# Patient Record
Sex: FEMALE | Race: WHITE | NOT HISPANIC OR LATINO | URBAN - METROPOLITAN AREA
[De-identification: names, ages, dates, MRNs, and addresses within clinical notes are randomized per-mention and may not be internally consistent; named-entity substitution may affect disease eponyms.]

---

## 2019-10-21 ENCOUNTER — OUTPATIENT (OUTPATIENT)
Dept: OUTPATIENT SERVICES | Facility: HOSPITAL | Age: 59
LOS: 1 days | End: 2019-10-21
Payer: COMMERCIAL

## 2019-10-21 VITALS
WEIGHT: 243.83 LBS | SYSTOLIC BLOOD PRESSURE: 141 MMHG | HEART RATE: 69 BPM | OXYGEN SATURATION: 98 % | RESPIRATION RATE: 16 BRPM | HEIGHT: 66 IN | TEMPERATURE: 98 F | DIASTOLIC BLOOD PRESSURE: 81 MMHG

## 2019-10-21 DIAGNOSIS — M20.5X1 OTHER DEFORMITIES OF TOE(S) (ACQUIRED), RIGHT FOOT: ICD-10-CM

## 2019-10-21 DIAGNOSIS — Z01.818 ENCOUNTER FOR OTHER PREPROCEDURAL EXAMINATION: ICD-10-CM

## 2019-10-21 DIAGNOSIS — M20.21 HALLUX RIGIDUS, RIGHT FOOT: ICD-10-CM

## 2019-10-21 DIAGNOSIS — M24.574 CONTRACTURE, RIGHT FOOT: ICD-10-CM

## 2019-10-21 LAB
ANION GAP SERPL CALC-SCNC: 10 MMOL/L — SIGNIFICANT CHANGE UP (ref 5–17)
BUN SERPL-MCNC: 15 MG/DL — SIGNIFICANT CHANGE UP (ref 7–23)
CALCIUM SERPL-MCNC: 9.4 MG/DL — SIGNIFICANT CHANGE UP (ref 8.4–10.5)
CHLORIDE SERPL-SCNC: 106 MMOL/L — SIGNIFICANT CHANGE UP (ref 96–108)
CO2 SERPL-SCNC: 26 MMOL/L — SIGNIFICANT CHANGE UP (ref 22–31)
CREAT SERPL-MCNC: 0.94 MG/DL — SIGNIFICANT CHANGE UP (ref 0.5–1.3)
GLUCOSE SERPL-MCNC: 105 MG/DL — HIGH (ref 70–99)
HCT VFR BLD CALC: 41.3 % — SIGNIFICANT CHANGE UP (ref 34.5–45)
HGB BLD-MCNC: 13.1 G/DL — SIGNIFICANT CHANGE UP (ref 11.5–15.5)
MCHC RBC-ENTMCNC: 31 PG — SIGNIFICANT CHANGE UP (ref 27–34)
MCHC RBC-ENTMCNC: 31.7 GM/DL — LOW (ref 32–36)
MCV RBC AUTO: 97.6 FL — SIGNIFICANT CHANGE UP (ref 80–100)
NRBC # BLD: 0 /100 WBCS — SIGNIFICANT CHANGE UP (ref 0–0)
PLATELET # BLD AUTO: 251 K/UL — SIGNIFICANT CHANGE UP (ref 150–400)
POTASSIUM SERPL-MCNC: 4.3 MMOL/L — SIGNIFICANT CHANGE UP (ref 3.5–5.3)
POTASSIUM SERPL-SCNC: 4.3 MMOL/L — SIGNIFICANT CHANGE UP (ref 3.5–5.3)
RBC # BLD: 4.23 M/UL — SIGNIFICANT CHANGE UP (ref 3.8–5.2)
RBC # FLD: 12.7 % — SIGNIFICANT CHANGE UP (ref 10.3–14.5)
SODIUM SERPL-SCNC: 142 MMOL/L — SIGNIFICANT CHANGE UP (ref 135–145)
WBC # BLD: 7.94 K/UL — SIGNIFICANT CHANGE UP (ref 3.8–10.5)
WBC # FLD AUTO: 7.94 K/UL — SIGNIFICANT CHANGE UP (ref 3.8–10.5)

## 2019-10-21 PROCEDURE — 36415 COLL VENOUS BLD VENIPUNCTURE: CPT

## 2019-10-21 PROCEDURE — 85027 COMPLETE CBC AUTOMATED: CPT

## 2019-10-21 PROCEDURE — G0463: CPT

## 2019-10-21 PROCEDURE — 80048 BASIC METABOLIC PNL TOTAL CA: CPT

## 2019-10-21 PROCEDURE — 93010 ELECTROCARDIOGRAM REPORT: CPT | Mod: NC

## 2019-10-21 PROCEDURE — 93005 ELECTROCARDIOGRAM TRACING: CPT

## 2019-10-21 RX ORDER — SODIUM CHLORIDE 9 MG/ML
1000 INJECTION, SOLUTION INTRAVENOUS
Refills: 0 | Status: DISCONTINUED | OUTPATIENT
Start: 2019-10-21 | End: 2019-10-21

## 2019-10-21 NOTE — H&P PST ADULT - ATTENDING COMMENTS
I have briefly examined this patient and reviewed her chart .  She denies any physical complaints today or changes in her medical status since PST .  see clearance .  Kassandra CHATTERJEE

## 2019-10-21 NOTE — H&P PST ADULT - NSANTHOSAYNRD_GEN_A_CORE
No. BARRIE screening performed.  STOP BANG Legend: 0-2 = LOW Risk; 3-4 = INTERMEDIATE Risk; 5-8 = HIGH Risk

## 2019-10-21 NOTE — H&P PST ADULT - CLICK TO LAUNCH ORM
Patient participated in class , and had many questions  She admits she loves chocolate, and still is having a hard time "giving up" sweets  She is working on this toward her Goal management 
.

## 2019-10-21 NOTE — H&P PST ADULT - NSICDXPASTMEDICALHX_GEN_ALL_CORE_FT
PAST MEDICAL HISTORY:  Hypercholesteremia     Hypothyroidism PAST MEDICAL HISTORY:  H/O diverticulitis of colon     Hypercholesteremia     Hypothyroidism     Osteoarthritis

## 2019-10-21 NOTE — H&P PST ADULT - NSICDXFAMILYHX_GEN_ALL_CORE_FT
FAMILY HISTORY:  FH: breast cancer, Sister- Alive  FH: heart attack, Father-   FH: hypercholesterolemia, Mother- Alive  FH: hypertension, Mother- Alive

## 2019-10-21 NOTE — H&P PST ADULT - NSICDXPASTSURGICALHX_GEN_ALL_CORE_FT
PAST SURGICAL HISTORY:  H/O colonoscopy 7/2019    H/O squamous cell carcinoma excision 5/2008    History of right hip replacement 10/2013 PAST SURGICAL HISTORY:  H/O colonoscopy 7/2019    H/O squamous cell carcinoma excision 5/2008 left arm    History of right hip replacement 10/2013

## 2019-10-21 NOTE — H&P PST ADULT - RS GEN PE MLT RESP DETAILS PC
clear to auscultation bilaterally/breath sounds equal/no chest wall tenderness/no intercostal retractions/respirations non-labored/airway patent/good air movement/normal

## 2019-10-31 ENCOUNTER — TRANSCRIPTION ENCOUNTER (OUTPATIENT)
Age: 59
End: 2019-10-31

## 2019-11-01 ENCOUNTER — OUTPATIENT (OUTPATIENT)
Dept: OUTPATIENT SERVICES | Facility: HOSPITAL | Age: 59
LOS: 1 days | End: 2019-11-01
Payer: COMMERCIAL

## 2019-11-01 VITALS
HEART RATE: 56 BPM | OXYGEN SATURATION: 98 % | RESPIRATION RATE: 16 BRPM | DIASTOLIC BLOOD PRESSURE: 73 MMHG | SYSTOLIC BLOOD PRESSURE: 110 MMHG

## 2019-11-01 DIAGNOSIS — M20.21 HALLUX RIGIDUS, RIGHT FOOT: ICD-10-CM

## 2019-11-01 DIAGNOSIS — M20.5X1 OTHER DEFORMITIES OF TOE(S) (ACQUIRED), RIGHT FOOT: ICD-10-CM

## 2019-11-01 DIAGNOSIS — M24.574 CONTRACTURE, RIGHT FOOT: ICD-10-CM

## 2019-11-01 DIAGNOSIS — Z98.890 OTHER SPECIFIED POSTPROCEDURAL STATES: Chronic | ICD-10-CM

## 2019-11-01 DIAGNOSIS — Z96.641 PRESENCE OF RIGHT ARTIFICIAL HIP JOINT: Chronic | ICD-10-CM

## 2019-11-01 PROCEDURE — 88311 DECALCIFY TISSUE: CPT

## 2019-11-01 PROCEDURE — C1713: CPT

## 2019-11-01 PROCEDURE — 28272 RELEASE OF TOE JOINT EACH: CPT | Mod: T8

## 2019-11-01 PROCEDURE — 88311 DECALCIFY TISSUE: CPT | Mod: 26

## 2019-11-01 PROCEDURE — 28296 COR HLX VLGS DSTL MTAR OSTEO: CPT | Mod: RT

## 2019-11-01 PROCEDURE — 73620 X-RAY EXAM OF FOOT: CPT

## 2019-11-01 PROCEDURE — 88304 TISSUE EXAM BY PATHOLOGIST: CPT | Mod: 26

## 2019-11-01 PROCEDURE — 73620 X-RAY EXAM OF FOOT: CPT | Mod: 26,RT

## 2019-11-01 PROCEDURE — 88304 TISSUE EXAM BY PATHOLOGIST: CPT

## 2019-11-01 PROCEDURE — 28270 RELEASE OF FOOT CONTRACTURE: CPT | Mod: XS,RT

## 2019-11-01 RX ORDER — SODIUM CHLORIDE 9 MG/ML
1000 INJECTION, SOLUTION INTRAVENOUS
Refills: 0 | Status: DISCONTINUED | OUTPATIENT
Start: 2019-11-01 | End: 2019-11-01

## 2019-11-01 RX ORDER — ONDANSETRON 8 MG/1
4 TABLET, FILM COATED ORAL ONCE
Refills: 0 | Status: DISCONTINUED | OUTPATIENT
Start: 2019-11-01 | End: 2019-11-01

## 2019-11-01 RX ORDER — HYDROMORPHONE HYDROCHLORIDE 2 MG/ML
0.5 INJECTION INTRAMUSCULAR; INTRAVENOUS; SUBCUTANEOUS
Refills: 0 | Status: DISCONTINUED | OUTPATIENT
Start: 2019-11-01 | End: 2019-11-01

## 2019-11-01 RX ORDER — HYDROMORPHONE HYDROCHLORIDE 2 MG/ML
1 INJECTION INTRAMUSCULAR; INTRAVENOUS; SUBCUTANEOUS
Refills: 0 | Status: DISCONTINUED | OUTPATIENT
Start: 2019-11-01 | End: 2019-11-01

## 2019-11-01 RX ADMIN — SODIUM CHLORIDE 50 MILLILITER(S): 9 INJECTION, SOLUTION INTRAVENOUS at 06:27

## 2019-11-01 NOTE — ASU PATIENT PROFILE, ADULT - PSH
H/O colonoscopy  7/2019  H/O squamous cell carcinoma excision  5/2008 left arm  History of right hip replacement  10/2013

## 2019-11-01 NOTE — ASU DISCHARGE PLAN (ADULT/PEDIATRIC) - CALL YOUR DOCTOR IF YOU HAVE ANY OF THE FOLLOWING:
Pain not relieved by Medications/Bleeding that does not stop Nausea and vomiting that does not stop/Inability to tolerate liquids or foods/Pain not relieved by Medications/Swelling that gets worse/Fever greater than (need to indicate Fahrenheit or Celsius)/Wound/Surgical Site with redness, or foul smelling discharge or pus/Numbness, tingling, color or temperature change to extremity/Bleeding that does not stop

## 2019-11-01 NOTE — ASU DISCHARGE PLAN (ADULT/PEDIATRIC) - NURSING INSTRUCTIONS
all discharge safety follow up care to MD. Use ice and elevation for pain management. Take pain medication with a food item and not on an empty stomach. Rest at home for the weekend. Eat lightly avoid heavy and spicy foods.

## 2019-11-01 NOTE — BRIEF OPERATIVE NOTE - NSICDXBRIEFPROCEDURE_GEN_ALL_CORE_FT
PROCEDURES:  Open flexor tenotomy of right foot 01-Nov-2019 07:52:32  Nick Chung  Tremaine bunionectomy 01-Nov-2019 07:52:13  Nick Chung

## 2019-11-01 NOTE — BRIEF OPERATIVE NOTE - NSICDXBRIEFPOSTOP_GEN_ALL_CORE_FT
POST-OP DIAGNOSIS:  Acquired hammertoe of right foot 01-Nov-2019 07:53:16  Nick Chung  HV (hallux valgus), right 01-Nov-2019 07:53:07  Nick Chung

## 2019-11-01 NOTE — BRIEF OPERATIVE NOTE - NSICDXBRIEFPREOP_GEN_ALL_CORE_FT
PRE-OP DIAGNOSIS:  Acquired hammertoe of right foot 01-Nov-2019 07:52:56  Nick Chung  Hallux valgus, right 01-Nov-2019 07:52:46  Nick Chung

## 2019-11-01 NOTE — ASU DISCHARGE PLAN (ADULT/PEDIATRIC) - CARE PROVIDER_API CALL
Brennen Mckinney (DPM)  Podiatric Medicine and Surgery  [address] [place] [address]  Phone: [phone]  Fax: [phone]  Follow Up Time: 1-3 days

## 2019-11-04 LAB — SURGICAL PATHOLOGY STUDY: SIGNIFICANT CHANGE UP

## 2020-03-02 NOTE — H&P PST ADULT - HEIGHT IN INCHES
Vital Signs Last 24 Hrs  T(C): 36.8 (02 Mar 2020 14:16), Max: 36.8 (02 Mar 2020 14:16)  T(F): 98.3 (02 Mar 2020 14:16), Max: 98.3 (02 Mar 2020 14:16)  HR: 54 (02 Mar 2020 14:16) (54 - 54)  BP: 134/79 (02 Mar 2020 14:16) (134/79 - 134/79)  BP(mean): --  RR: 16 (02 Mar 2020 14:16) (16 - 16)  SpO2: 98% (02 Mar 2020 14:16) (98% - 98%) 6 Vital Signs Last 24 Hrs  T(C): 36.8 (02 Mar 2020 14:16), Max: 36.8 (02 Mar 2020 14:16)  T(F): 98.3 (02 Mar 2020 14:16), Max: 98.3 (02 Mar 2020 14:16)  HR: 54 (02 Mar 2020 14:16) (54 - 54)  BP: 134/79 (02 Mar 2020 14:16) (134/79 - 134/79)  BP(mean): --  RR: 16 (02 Mar 2020 14:16) (16 - 16)  SpO2: 98% (02 Mar 2020 14:16) (98% - 98%)    GENERAL: patient appears thin, well, no acute distress, appropriate, pleasant  EYES: sclera clear, no exudates  ENMT: oropharynx clear without erythema, no exudates, moist mucous membranes  NECK: supple, soft   LUNGS: good air entry bilaterally, clear to auscultation, symmetric breath sounds, no wheezing or rhonchi appreciated  HEART: soft S1/S2, regular rate and rhythm, no murmurs noted, no lower extremity edema  GASTROINTESTINAL: abdomen is soft, nontender, nondistended, normoactive bowel sounds, no palpable masses  INTEGUMENT: good skin turgor,   MUSCULOSKELETAL: no clubbing or cyanosis, no obvious deformity  NEUROLOGIC: awake, alert, oriented x3, good muscle tone in 4 extremities, no obvious sensory deficits  PSYCHIATRIC: mood is good, affect is congruent, linear and logical thought process  HEME/LYMPH: large ecchymosis to left hip, no petechiae Vital Signs Last 24 Hrs  T(C): 36.8 (02 Mar 2020 14:16), Max: 36.8 (02 Mar 2020 14:16)  T(F): 98.3 (02 Mar 2020 14:16), Max: 98.3 (02 Mar 2020 14:16)  HR: 54 (02 Mar 2020 14:16) (54 - 54)  BP: 134/79 (02 Mar 2020 14:16) (134/79 - 134/79)  BP(mean): --  RR: 16 (02 Mar 2020 14:16) (16 - 16)  SpO2: 98% (02 Mar 2020 14:16) (98% - 98%)    GENERAL: patient appears thin, well, no acute distress, appropriate, pleasant  EYES: sclera clear, no exudates  ENMT: oropharynx clear without erythema, no exudates, moist mucous membranes  NECK: supple, soft   LUNGS: good air entry bilaterally, clear to auscultation, symmetric breath sounds, no wheezing or rhonchi appreciated  HEART: soft S1/S2, regular rate and rhythm, no murmurs noted, no lower extremity edema  GASTROINTESTINAL: abdomen is soft, nontender, nondistended, normoactive bowel sounds, firmly palpable liver when assessed medially and nontender  INTEGUMENT: good skin turgor,   MUSCULOSKELETAL: no clubbing or cyanosis, no obvious deformity  NEUROLOGIC: awake, alert, oriented x3, good muscle tone in 4 extremities, no obvious sensory deficits  PSYCHIATRIC: mood is good, affect is congruent, linear and logical thought process  HEME/LYMPH: large ecchymosis to left hip, no petechiae

## 2023-07-24 PROBLEM — Z87.19 PERSONAL HISTORY OF OTHER DISEASES OF THE DIGESTIVE SYSTEM: Chronic | Status: ACTIVE | Noted: 2019-10-21

## 2023-07-24 PROBLEM — E03.9 HYPOTHYROIDISM, UNSPECIFIED: Chronic | Status: ACTIVE | Noted: 2019-10-21

## 2023-07-24 PROBLEM — E78.00 PURE HYPERCHOLESTEROLEMIA, UNSPECIFIED: Chronic | Status: ACTIVE | Noted: 2019-10-21

## 2023-07-24 PROBLEM — Z00.00 ENCOUNTER FOR PREVENTIVE HEALTH EXAMINATION: Status: ACTIVE | Noted: 2023-07-24

## 2023-07-24 PROBLEM — M19.90 UNSPECIFIED OSTEOARTHRITIS, UNSPECIFIED SITE: Chronic | Status: ACTIVE | Noted: 2019-10-21

## 2023-07-31 ENCOUNTER — APPOINTMENT (OUTPATIENT)
Dept: OTOLARYNGOLOGY | Facility: CLINIC | Age: 63
End: 2023-07-31
Payer: COMMERCIAL

## 2023-07-31 ENCOUNTER — LABORATORY RESULT (OUTPATIENT)
Age: 63
End: 2023-07-31

## 2023-07-31 DIAGNOSIS — Z87.39 PERSONAL HISTORY OF OTHER DISEASES OF THE MUSCULOSKELETAL SYSTEM AND CONNECTIVE TISSUE: ICD-10-CM

## 2023-07-31 DIAGNOSIS — C44.310 BASAL CELL CARCINOMA OF SKIN OF UNSPECIFIED PARTS OF FACE: ICD-10-CM

## 2023-07-31 DIAGNOSIS — Z87.891 PERSONAL HISTORY OF NICOTINE DEPENDENCE: ICD-10-CM

## 2023-07-31 DIAGNOSIS — C44.629 SQUAMOUS CELL CARCINOMA OF SKIN OF LEFT UPPER LIMB, INCLUDING SHOULDER: ICD-10-CM

## 2023-07-31 DIAGNOSIS — Z86.39 PERSONAL HISTORY OF OTHER ENDOCRINE, NUTRITIONAL AND METABOLIC DISEASE: ICD-10-CM

## 2023-07-31 DIAGNOSIS — K14.8 OTHER DISEASES OF TONGUE: ICD-10-CM

## 2023-07-31 DIAGNOSIS — Z86.2 PERSONAL HISTORY OF DISEASES OF THE BLOOD AND BLOOD-FORMING ORGANS AND CERTAIN DISORDERS INVOLVING THE IMMUNE MECHANISM: ICD-10-CM

## 2023-07-31 PROCEDURE — 41100 BIOPSY OF TONGUE: CPT

## 2023-07-31 PROCEDURE — 99202 OFFICE O/P NEW SF 15 MIN: CPT | Mod: 25

## 2023-07-31 PROCEDURE — 31575 DIAGNOSTIC LARYNGOSCOPY: CPT

## 2023-07-31 RX ORDER — ATORVASTATIN CALCIUM 10 MG/1
10 TABLET, FILM COATED ORAL
Refills: 0 | Status: ACTIVE | COMMUNITY

## 2023-07-31 RX ORDER — LEVOTHYROXINE SODIUM 0.07 MG/1
75 TABLET ORAL
Refills: 0 | Status: ACTIVE | COMMUNITY

## 2023-07-31 NOTE — PROCEDURE
[FreeTextEntry1] : Biopsy right posterior tongue [FreeTextEntry2] : Right posterior tongue lesion concerning for SCCa [FreeTextEntry3] : After patient gave consent, the area of concern was anesthesized with 1% Lidocaine with 1:100K epinephrine.  A biopsy was performed and sent to pathology for further evaluation.  Hemostasis was obtained with silver nitrate.  The patient tolerated the procedure well. [Lesion] : lesion identified by mirror examination needing further evaluation [None] : none [Flexible Endoscope] : examined with the flexible endoscope [Serial Number: ___] : Serial Number: [unfilled] [de-identified] : No lesions in the NPx, HPx or larynx.  There appears to be involvement of the anterior BOT and right tonsillar pillar.  VC are mobile, airway patent.

## 2023-07-31 NOTE — CONSULT LETTER
[Dear  ___] : Dear  [unfilled], [Consult Letter:] : I had the pleasure of evaluating your patient, [unfilled]. [Please see my note below.] : Please see my note below. [Consult Closing:] : Thank you very much for allowing me to participate in the care of this patient.  If you have any questions, please do not hesitate to contact me. [Sincerely,] : Sincerely, [FreeTextEntry2] : Dr. Karoline Alves [address],  [place] [address] [FreeTextEntry3] : Dev

## 2023-07-31 NOTE — DATA REVIEWED
[de-identified] : CT Neck reviewed - posterior tongue lesion with irregular LN in right level IIa.

## 2023-07-31 NOTE — PHYSICAL EXAM
[de-identified] : Approx. 1.5 cm R. level IIa LN, mobile, no TTP. [Laryngoscopy Performed] : laryngoscopy was performed, see procedure section for findings [FreeTextEntry1] : Irregular lesion involving the right posterior oral tongue and appears to extend along the right anterior tonsillar pillar.   [Normal] : no rashes

## 2023-07-31 NOTE — HISTORY OF PRESENT ILLNESS
[de-identified] : Pt is refer by gómez Rodriguez for tongue lesion. Ct of neck - 1.7 cm right postero-lateral tongue lesion with mild adjacent right level 2A lymphadenopathy. Tissue sampling is recommended.  Pt states right tongue lesion for 2 months, no change of size, some irritation. no dypshagia or wt loss former smoker social etoh.

## 2023-08-14 ENCOUNTER — APPOINTMENT (OUTPATIENT)
Dept: OTOLARYNGOLOGY | Facility: CLINIC | Age: 63
End: 2023-08-14
Payer: COMMERCIAL

## 2023-08-14 VITALS
BODY MASS INDEX: 41.65 KG/M2 | WEIGHT: 250 LBS | HEART RATE: 81 BPM | HEIGHT: 65 IN | OXYGEN SATURATION: 99 % | RESPIRATION RATE: 16 BRPM | SYSTOLIC BLOOD PRESSURE: 149 MMHG | DIASTOLIC BLOOD PRESSURE: 84 MMHG

## 2023-08-14 PROCEDURE — 31575 DIAGNOSTIC LARYNGOSCOPY: CPT

## 2023-08-14 PROCEDURE — 99214 OFFICE O/P EST MOD 30 MIN: CPT | Mod: 25

## 2023-08-14 NOTE — HISTORY OF PRESENT ILLNESS
[de-identified] : Pt is refer by gómez Rodriguez for tongue lesion. Ct of neck - 1.7 cm right postero-lateral tongue lesion with mild adjacent right level 2A lymphadenopathy. Pt is here post bx right posterior tongue lesion: Fragments of squamous cell carcinoma. CT of chest on 8/11/2023, pet ct schedule 8/17/2023. Pt is doing ok, no pain, eating fine.  former smoker social etoh.

## 2023-08-14 NOTE — PHYSICAL EXAM
[Normal] : no rashes [Laryngoscopy Performed] : laryngoscopy was performed, see procedure section for findings [de-identified] : Approx. 1.5 cm R. level IIa LN, mobile, no TTP. [FreeTextEntry1] : Irregular lesion involving the right posterior oral tongue and appears to extend along the right anterior tonsillar pillar.

## 2023-08-14 NOTE — REASON FOR VISIT
[Subsequent Evaluation] : a subsequent evaluation for [FreeTextEntry2] : f/u tongue lesion  bx result

## 2023-08-14 NOTE — PROCEDURE
[Lesion] : lesion identified by mirror examination needing further evaluation [None] : none [Flexible Endoscope] : examined with the flexible endoscope [Serial Number: ___] : Serial Number: [unfilled] [de-identified] : Lesion along the right posterior tongue involving the anterior tonsillar pillar, no significant extension into the BOT.  VC mobile, airway patent.

## 2023-08-17 ENCOUNTER — APPOINTMENT (OUTPATIENT)
Dept: NUCLEAR MEDICINE | Facility: CLINIC | Age: 63
End: 2023-08-17

## 2023-08-18 ENCOUNTER — APPOINTMENT (OUTPATIENT)
Dept: PLASTIC SURGERY | Facility: CLINIC | Age: 63
End: 2023-08-18
Payer: COMMERCIAL

## 2023-08-18 VITALS
HEART RATE: 57 BPM | BODY MASS INDEX: 39.55 KG/M2 | OXYGEN SATURATION: 100 % | WEIGHT: 252 LBS | DIASTOLIC BLOOD PRESSURE: 85 MMHG | SYSTOLIC BLOOD PRESSURE: 132 MMHG | HEIGHT: 67 IN | TEMPERATURE: 97.9 F

## 2023-08-18 DIAGNOSIS — Z80.8 FAMILY HISTORY OF MALIGNANT NEOPLASM OF OTHER ORGANS OR SYSTEMS: ICD-10-CM

## 2023-08-18 DIAGNOSIS — Z85.89 PERSONAL HISTORY OF MALIGNANT NEOPLASM OF OTHER ORGANS AND SYSTEMS: ICD-10-CM

## 2023-08-18 DIAGNOSIS — Z78.9 OTHER SPECIFIED HEALTH STATUS: ICD-10-CM

## 2023-08-18 DIAGNOSIS — Z85.828 PERSONAL HISTORY OF OTHER MALIGNANT NEOPLASM OF SKIN: ICD-10-CM

## 2023-08-18 DIAGNOSIS — E03.9 HYPOTHYROIDISM, UNSPECIFIED: ICD-10-CM

## 2023-08-18 DIAGNOSIS — I10 ESSENTIAL (PRIMARY) HYPERTENSION: ICD-10-CM

## 2023-08-18 PROCEDURE — XXXXX: CPT | Mod: 1L

## 2023-08-18 NOTE — REASON FOR VISIT
[Consultation] : a consultation visit [FreeTextEntry1] : Patient presents today for a consultation regarding her recently diagnosed right tongue SCCa as referred by Dr. Gage Davenport (otolaryngology). Patient states she had a sinus issue in May 2023 and went to an urgent care for treatment and while she was being seen, she also mentioned a "scratchy" area of her right posterior tongue to the physician. The urgent care physician recommended the patient follow up with ENT regarding the tongue lesion and patient states she did see an ENT who referred her to Dr. Davenport. Patient states Dr. Davenport did a biopsy of the lesion on 07/31/23 that returned positive for SCCa. She states she has been having an itch/irritation of her right posterior tongue since April 2023 and reports she is a former smoker. She denies bleeding, drainage, and pain. Patient presents today to discuss possible reconstruction options.

## 2023-08-22 ENCOUNTER — OUTPATIENT (OUTPATIENT)
Dept: OUTPATIENT SERVICES | Facility: HOSPITAL | Age: 63
LOS: 1 days | End: 2023-08-22
Payer: COMMERCIAL

## 2023-08-22 VITALS
SYSTOLIC BLOOD PRESSURE: 134 MMHG | DIASTOLIC BLOOD PRESSURE: 84 MMHG | RESPIRATION RATE: 16 BRPM | TEMPERATURE: 97 F | OXYGEN SATURATION: 97 % | WEIGHT: 253.09 LBS | HEART RATE: 61 BPM | HEIGHT: 65.5 IN

## 2023-08-22 DIAGNOSIS — R94.31 ABNORMAL ELECTROCARDIOGRAM [ECG] [EKG]: ICD-10-CM

## 2023-08-22 DIAGNOSIS — E03.9 HYPOTHYROIDISM, UNSPECIFIED: ICD-10-CM

## 2023-08-22 DIAGNOSIS — C02.9 MALIGNANT NEOPLASM OF TONGUE, UNSPECIFIED: ICD-10-CM

## 2023-08-22 DIAGNOSIS — Z96.641 PRESENCE OF RIGHT ARTIFICIAL HIP JOINT: Chronic | ICD-10-CM

## 2023-08-22 DIAGNOSIS — Z91.89 OTHER SPECIFIED PERSONAL RISK FACTORS, NOT ELSEWHERE CLASSIFIED: ICD-10-CM

## 2023-08-22 DIAGNOSIS — Z98.890 OTHER SPECIFIED POSTPROCEDURAL STATES: Chronic | ICD-10-CM

## 2023-08-22 LAB
ALBUMIN SERPL ELPH-MCNC: 4.3 G/DL — SIGNIFICANT CHANGE UP (ref 3.3–5)
ALP SERPL-CCNC: 83 U/L — SIGNIFICANT CHANGE UP (ref 40–120)
ALT FLD-CCNC: 16 U/L — SIGNIFICANT CHANGE UP (ref 4–33)
ANION GAP SERPL CALC-SCNC: 13 MMOL/L — SIGNIFICANT CHANGE UP (ref 7–14)
APTT BLD: 28.9 SEC — SIGNIFICANT CHANGE UP (ref 24.5–35.6)
AST SERPL-CCNC: 14 U/L — SIGNIFICANT CHANGE UP (ref 4–32)
BILIRUB SERPL-MCNC: 0.3 MG/DL — SIGNIFICANT CHANGE UP (ref 0.2–1.2)
BLD GP AB SCN SERPL QL: NEGATIVE — SIGNIFICANT CHANGE UP
BUN SERPL-MCNC: 13 MG/DL — SIGNIFICANT CHANGE UP (ref 7–23)
CALCIUM SERPL-MCNC: 9.4 MG/DL — SIGNIFICANT CHANGE UP (ref 8.4–10.5)
CHLORIDE SERPL-SCNC: 102 MMOL/L — SIGNIFICANT CHANGE UP (ref 98–107)
CO2 SERPL-SCNC: 22 MMOL/L — SIGNIFICANT CHANGE UP (ref 22–31)
CREAT SERPL-MCNC: 0.85 MG/DL — SIGNIFICANT CHANGE UP (ref 0.5–1.3)
EGFR: 77 ML/MIN/1.73M2 — SIGNIFICANT CHANGE UP
GLUCOSE SERPL-MCNC: 80 MG/DL — SIGNIFICANT CHANGE UP (ref 70–99)
HCT VFR BLD CALC: 38.6 % — SIGNIFICANT CHANGE UP (ref 34.5–45)
HGB BLD-MCNC: 12.6 G/DL — SIGNIFICANT CHANGE UP (ref 11.5–15.5)
INR BLD: <0.9 RATIO — SIGNIFICANT CHANGE UP (ref 0.85–1.18)
MCHC RBC-ENTMCNC: 30.8 PG — SIGNIFICANT CHANGE UP (ref 27–34)
MCHC RBC-ENTMCNC: 32.6 GM/DL — SIGNIFICANT CHANGE UP (ref 32–36)
MCV RBC AUTO: 94.4 FL — SIGNIFICANT CHANGE UP (ref 80–100)
NRBC # BLD: 0 /100 WBCS — SIGNIFICANT CHANGE UP (ref 0–0)
NRBC # FLD: 0 K/UL — SIGNIFICANT CHANGE UP (ref 0–0)
PLATELET # BLD AUTO: 279 K/UL — SIGNIFICANT CHANGE UP (ref 150–400)
POTASSIUM SERPL-MCNC: 3.8 MMOL/L — SIGNIFICANT CHANGE UP (ref 3.5–5.3)
POTASSIUM SERPL-SCNC: 3.8 MMOL/L — SIGNIFICANT CHANGE UP (ref 3.5–5.3)
PROT SERPL-MCNC: 7.2 G/DL — SIGNIFICANT CHANGE UP (ref 6–8.3)
PROTHROM AB SERPL-ACNC: 10.1 SEC — SIGNIFICANT CHANGE UP (ref 9.5–13)
RBC # BLD: 4.09 M/UL — SIGNIFICANT CHANGE UP (ref 3.8–5.2)
RBC # FLD: 13 % — SIGNIFICANT CHANGE UP (ref 10.3–14.5)
RH IG SCN BLD-IMP: POSITIVE — SIGNIFICANT CHANGE UP
SODIUM SERPL-SCNC: 137 MMOL/L — SIGNIFICANT CHANGE UP (ref 135–145)
WBC # BLD: 6.51 K/UL — SIGNIFICANT CHANGE UP (ref 3.8–10.5)
WBC # FLD AUTO: 6.51 K/UL — SIGNIFICANT CHANGE UP (ref 3.8–10.5)

## 2023-08-22 PROCEDURE — 93010 ELECTROCARDIOGRAM REPORT: CPT

## 2023-08-22 RX ORDER — SODIUM CHLORIDE 9 MG/ML
3 INJECTION INTRAMUSCULAR; INTRAVENOUS; SUBCUTANEOUS ONCE
Refills: 0 | Status: DISCONTINUED | OUTPATIENT
Start: 2023-08-30 | End: 2023-08-31

## 2023-08-22 RX ORDER — SODIUM CHLORIDE 9 MG/ML
1000 INJECTION, SOLUTION INTRAVENOUS
Refills: 0 | Status: DISCONTINUED | OUTPATIENT
Start: 2023-08-30 | End: 2023-08-30

## 2023-08-22 RX ORDER — LEVOTHYROXINE SODIUM 125 MCG
1 TABLET ORAL
Qty: 0 | Refills: 0 | DISCHARGE

## 2023-08-22 NOTE — H&P PST ADULT - CONSTITUTIONAL
well-groomed/no distress/distress due to pain morbid/well-groomed/no distress/distress due to pain/obese

## 2023-08-22 NOTE — H&P PST ADULT - MUSCULOSKELETAL
ROM intact/normal gait/strength 5/5 bilateral upper extremities/strength 5/5 bilateral lower extremities details… right knee/ROM intact/joint swelling/normal gait/strength 5/5 bilateral upper extremities/strength 5/5 bilateral lower extremities

## 2023-08-22 NOTE — H&P PST ADULT - NSICDXPASTMEDICALHX_GEN_ALL_CORE_FT
PAST MEDICAL HISTORY:  H/O diverticulitis of colon     Hypercholesteremia     Hypothyroidism     Osteoarthritis      PAST MEDICAL HISTORY:  Dyslipidemia     H/O diverticulitis of colon     Hypercholesteremia     Hypothyroidism     Malignant neoplasm of tongue, unspecified     Morbid obesity     Osteoarthritis

## 2023-08-22 NOTE — H&P PST ADULT - PROBLEM SELECTOR PLAN 4
comparison in chart.  Pt has appointment with PCP 8/23/23 @ 10Am for medical eval  Pending copy of report.

## 2023-08-22 NOTE — H&P PST ADULT - PROBLEM SELECTOR PLAN 1
Scheduled right glossectomy, floor of mouth resection, limited pharyngectomy right neck dissection, tracheostomy.  Written & verbal preop instructions, gi prophylaxis & surgical soap given  Pt verbalized good understanding.  Teach back done on surgical soap instructions.

## 2023-08-22 NOTE — H&P PST ADULT - PHARYNX
no redness/no discharge small whitish lesion - right lateral aspect of tongue/no redness/no discharge

## 2023-08-22 NOTE — H&P PST ADULT - NSICDXPASTSURGICALHX_GEN_ALL_CORE_FT
PAST SURGICAL HISTORY:  H/O basal cell carcinoma excision     H/O colonoscopy 7/2019    H/O squamous cell carcinoma excision 5/2008 left arm    History of right hip replacement 10/2013

## 2023-08-22 NOTE — H&P PST ADULT - NSANTHAGERD_ENT_A_CORE
Topical Steroids Counseling: I discussed with the patient that prolonged use of topical steroids can result in the increased appearance of superficial blood vessels (telangiectasias), lightening (hypopigmentation) and thinning of the skin (atrophy).   The patient verbalized understanding of the proper use and possible adverse effects of topical steroids.  All of the patient's questions and concerns were addressed.
Detail Level: Zone
Yes

## 2023-08-24 ENCOUNTER — TRANSCRIPTION ENCOUNTER (OUTPATIENT)
Age: 63
End: 2023-08-24

## 2023-08-29 NOTE — ASU PATIENT PROFILE, ADULT - NSICDXPASTMEDICALHX_GEN_ALL_CORE_FT
PAST MEDICAL HISTORY:  Dyslipidemia     H/O diverticulitis of colon     Hypercholesteremia     Hypothyroidism     Malignant neoplasm of tongue, unspecified     Morbid obesity     Osteoarthritis

## 2023-08-30 ENCOUNTER — APPOINTMENT (OUTPATIENT)
Dept: OTOLARYNGOLOGY | Facility: HOSPITAL | Age: 63
End: 2023-08-30

## 2023-08-30 ENCOUNTER — INPATIENT (INPATIENT)
Facility: HOSPITAL | Age: 63
LOS: 6 days | Discharge: HOME | End: 2023-09-06
Attending: OTOLARYNGOLOGY | Admitting: OTOLARYNGOLOGY
Payer: COMMERCIAL

## 2023-08-30 ENCOUNTER — APPOINTMENT (OUTPATIENT)
Dept: PLASTIC SURGERY | Facility: HOSPITAL | Age: 63
End: 2023-08-30
Payer: COMMERCIAL

## 2023-08-30 ENCOUNTER — RESULT REVIEW (OUTPATIENT)
Age: 63
End: 2023-08-30

## 2023-08-30 VITALS
HEIGHT: 65.5 IN | RESPIRATION RATE: 16 BRPM | WEIGHT: 253.09 LBS | DIASTOLIC BLOOD PRESSURE: 71 MMHG | OXYGEN SATURATION: 96 % | HEART RATE: 66 BPM | TEMPERATURE: 99 F | SYSTOLIC BLOOD PRESSURE: 158 MMHG

## 2023-08-30 DIAGNOSIS — Z98.890 OTHER SPECIFIED POSTPROCEDURAL STATES: Chronic | ICD-10-CM

## 2023-08-30 DIAGNOSIS — C02.9 MALIGNANT NEOPLASM OF TONGUE, UNSPECIFIED: ICD-10-CM

## 2023-08-30 DIAGNOSIS — Z96.641 PRESENCE OF RIGHT ARTIFICIAL HIP JOINT: Chronic | ICD-10-CM

## 2023-08-30 LAB
A1C WITH ESTIMATED AVERAGE GLUCOSE RESULT: 5.2 % — SIGNIFICANT CHANGE UP (ref 4–5.6)
ANION GAP SERPL CALC-SCNC: 14 MMOL/L — SIGNIFICANT CHANGE UP (ref 7–14)
APTT BLD: 29.4 SEC — SIGNIFICANT CHANGE UP (ref 24.5–35.6)
BLOOD GAS ARTERIAL - LYTES,HGB,ICA,LACT RESULT: SIGNIFICANT CHANGE UP
BUN SERPL-MCNC: 9 MG/DL — SIGNIFICANT CHANGE UP (ref 7–23)
CALCIUM SERPL-MCNC: 9.2 MG/DL — SIGNIFICANT CHANGE UP (ref 8.4–10.5)
CHLORIDE SERPL-SCNC: 112 MMOL/L — HIGH (ref 98–107)
CO2 SERPL-SCNC: 21 MMOL/L — LOW (ref 22–31)
CREAT SERPL-MCNC: 0.72 MG/DL — SIGNIFICANT CHANGE UP (ref 0.5–1.3)
EGFR: 94 ML/MIN/1.73M2 — SIGNIFICANT CHANGE UP
ESTIMATED AVERAGE GLUCOSE: 103 — SIGNIFICANT CHANGE UP
GAS PNL BLDA: SIGNIFICANT CHANGE UP
GAS PNL BLDA: SIGNIFICANT CHANGE UP
GLUCOSE SERPL-MCNC: 172 MG/DL — HIGH (ref 70–99)
HCT VFR BLD CALC: 38.9 % — SIGNIFICANT CHANGE UP (ref 34.5–45)
HGB BLD-MCNC: 12.5 G/DL — SIGNIFICANT CHANGE UP (ref 11.5–15.5)
INR BLD: 0.94 RATIO — SIGNIFICANT CHANGE UP (ref 0.85–1.18)
MAGNESIUM SERPL-MCNC: 2.1 MG/DL — SIGNIFICANT CHANGE UP (ref 1.6–2.6)
MCHC RBC-ENTMCNC: 31.1 PG — SIGNIFICANT CHANGE UP (ref 27–34)
MCHC RBC-ENTMCNC: 32.1 GM/DL — SIGNIFICANT CHANGE UP (ref 32–36)
MCV RBC AUTO: 96.8 FL — SIGNIFICANT CHANGE UP (ref 80–100)
NRBC # BLD: 0 /100 WBCS — SIGNIFICANT CHANGE UP (ref 0–0)
NRBC # FLD: 0 K/UL — SIGNIFICANT CHANGE UP (ref 0–0)
PHOSPHATE SERPL-MCNC: 3 MG/DL — SIGNIFICANT CHANGE UP (ref 2.5–4.5)
PLATELET # BLD AUTO: 229 K/UL — SIGNIFICANT CHANGE UP (ref 150–400)
POTASSIUM SERPL-MCNC: 3.5 MMOL/L — SIGNIFICANT CHANGE UP (ref 3.5–5.3)
POTASSIUM SERPL-SCNC: 3.5 MMOL/L — SIGNIFICANT CHANGE UP (ref 3.5–5.3)
PROTHROM AB SERPL-ACNC: 10.6 SEC — SIGNIFICANT CHANGE UP (ref 9.5–13)
RBC # BLD: 4.02 M/UL — SIGNIFICANT CHANGE UP (ref 3.8–5.2)
RBC # FLD: 13.4 % — SIGNIFICANT CHANGE UP (ref 10.3–14.5)
RH IG SCN BLD-IMP: POSITIVE — SIGNIFICANT CHANGE UP
SODIUM SERPL-SCNC: 147 MMOL/L — HIGH (ref 135–145)
TSH SERPL-MCNC: 2.7 UIU/ML — SIGNIFICANT CHANGE UP (ref 0.27–4.2)
WBC # BLD: 12.58 K/UL — HIGH (ref 3.8–10.5)
WBC # FLD AUTO: 12.58 K/UL — HIGH (ref 3.8–10.5)

## 2023-08-30 PROCEDURE — 41120 PARTIAL REMOVAL OF TONGUE: CPT | Mod: GC

## 2023-08-30 PROCEDURE — 99291 CRITICAL CARE FIRST HOUR: CPT

## 2023-08-30 PROCEDURE — 15100 SPLT AGRFT T/A/L 1ST 100SQCM: CPT

## 2023-08-30 PROCEDURE — 40840 RECONSTRUCTION OF MOUTH: CPT

## 2023-08-30 PROCEDURE — 88309 TISSUE EXAM BY PATHOLOGIST: CPT | Mod: 26

## 2023-08-30 PROCEDURE — 15757 FREE SKIN FLAP MICROVASC: CPT

## 2023-08-30 PROCEDURE — 42890 LIMITED PHARYNGECTOMY: CPT | Mod: GC

## 2023-08-30 PROCEDURE — 88307 TISSUE EXAM BY PATHOLOGIST: CPT | Mod: 26

## 2023-08-30 PROCEDURE — 14301 TIS TRNFR ANY 30.1-60 SQ CM: CPT

## 2023-08-30 PROCEDURE — 88331 PATH CONSLTJ SURG 1 BLK 1SPC: CPT | Mod: 26

## 2023-08-30 PROCEDURE — 71045 X-RAY EXAM CHEST 1 VIEW: CPT | Mod: 26

## 2023-08-30 PROCEDURE — 31600 PLANNED TRACHEOSTOMY: CPT | Mod: GC,59

## 2023-08-30 PROCEDURE — 88305 TISSUE EXAM BY PATHOLOGIST: CPT | Mod: 26

## 2023-08-30 PROCEDURE — 41116 EXCISION OF MOUTH LESION: CPT | Mod: GC

## 2023-08-30 PROCEDURE — 38724 REMOVAL OF LYMPH NODES NECK: CPT | Mod: GC,RT

## 2023-08-30 PROCEDURE — 97605 NEG PRS WND THER DME<=50SQCM: CPT | Mod: 59

## 2023-08-30 DEVICE — COUPLER VESSEL ANASTOMOTIC 3MM: Type: IMPLANTABLE DEVICE | Status: FUNCTIONAL

## 2023-08-30 DEVICE — TUBE TRACH SZ 6 CUFF FLEX REUSE: Type: IMPLANTABLE DEVICE | Status: FUNCTIONAL

## 2023-08-30 DEVICE — DOPPLER PROBE DISPOSABLE: Type: IMPLANTABLE DEVICE | Status: FUNCTIONAL

## 2023-08-30 DEVICE — IMPLANTABLE DEVICE: Type: IMPLANTABLE DEVICE | Status: FUNCTIONAL

## 2023-08-30 DEVICE — COUPLER VESSEL MICROVASC ANAST 2MM GRN: Type: IMPLANTABLE DEVICE | Status: FUNCTIONAL

## 2023-08-30 DEVICE — CARTRIDGE MICROCLIP 30: Type: IMPLANTABLE DEVICE | Status: FUNCTIONAL

## 2023-08-30 DEVICE — SURGIFOAM PAD 8CM X 12.5CM X 2MM (100C): Type: IMPLANTABLE DEVICE | Status: FUNCTIONAL

## 2023-08-30 DEVICE — LIGATING CLIPS WECK HORIZON SMALL-WIDE (RED) 24: Type: IMPLANTABLE DEVICE | Status: FUNCTIONAL

## 2023-08-30 DEVICE — LIGATING CLIPS WECK HORIZON MEDIUM (BLUE) 24: Type: IMPLANTABLE DEVICE | Status: FUNCTIONAL

## 2023-08-30 RX ORDER — LEVOTHYROXINE SODIUM 125 MCG
60 TABLET ORAL AT BEDTIME
Refills: 0 | Status: DISCONTINUED | OUTPATIENT
Start: 2023-08-30 | End: 2023-09-06

## 2023-08-30 RX ORDER — GABAPENTIN 400 MG/1
200 CAPSULE ORAL THREE TIMES A DAY
Refills: 0 | Status: DISCONTINUED | OUTPATIENT
Start: 2023-08-30 | End: 2023-09-04

## 2023-08-30 RX ORDER — POTASSIUM CHLORIDE 20 MEQ
40 PACKET (EA) ORAL ONCE
Refills: 0 | Status: COMPLETED | OUTPATIENT
Start: 2023-08-30 | End: 2023-08-30

## 2023-08-30 RX ORDER — LEVOTHYROXINE SODIUM 125 MCG
1 TABLET ORAL
Refills: 0 | DISCHARGE

## 2023-08-30 RX ORDER — APREPITANT 80 MG/1
40 CAPSULE ORAL ONCE
Refills: 0 | Status: COMPLETED | OUTPATIENT
Start: 2023-08-30 | End: 2023-08-30

## 2023-08-30 RX ORDER — AMPICILLIN SODIUM AND SULBACTAM SODIUM 250; 125 MG/ML; MG/ML
3 INJECTION, POWDER, FOR SUSPENSION INTRAMUSCULAR; INTRAVENOUS ONCE
Refills: 0 | Status: COMPLETED | OUTPATIENT
Start: 2023-08-30 | End: 2023-08-30

## 2023-08-30 RX ORDER — OXYCODONE HYDROCHLORIDE 5 MG/1
2.5 TABLET ORAL EVERY 4 HOURS
Refills: 0 | Status: DISCONTINUED | OUTPATIENT
Start: 2023-08-30 | End: 2023-08-31

## 2023-08-30 RX ORDER — ONDANSETRON 8 MG/1
4 TABLET, FILM COATED ORAL EVERY 6 HOURS
Refills: 0 | Status: DISCONTINUED | OUTPATIENT
Start: 2023-08-30 | End: 2023-09-06

## 2023-08-30 RX ORDER — POLYETHYLENE GLYCOL 3350 17 G/17G
17 POWDER, FOR SOLUTION ORAL DAILY
Refills: 0 | Status: DISCONTINUED | OUTPATIENT
Start: 2023-08-30 | End: 2023-09-06

## 2023-08-30 RX ORDER — CHLORHEXIDINE GLUCONATE 213 G/1000ML
15 SOLUTION TOPICAL
Refills: 0 | Status: DISCONTINUED | OUTPATIENT
Start: 2023-08-30 | End: 2023-09-05

## 2023-08-30 RX ORDER — ATORVASTATIN CALCIUM 80 MG/1
1 TABLET, FILM COATED ORAL
Qty: 0 | Refills: 0 | DISCHARGE

## 2023-08-30 RX ORDER — AMPICILLIN SODIUM AND SULBACTAM SODIUM 250; 125 MG/ML; MG/ML
INJECTION, POWDER, FOR SUSPENSION INTRAMUSCULAR; INTRAVENOUS
Refills: 0 | Status: COMPLETED | OUTPATIENT
Start: 2023-08-30 | End: 2023-08-31

## 2023-08-30 RX ORDER — GABAPENTIN 400 MG/1
600 CAPSULE ORAL ONCE
Refills: 0 | Status: COMPLETED | OUTPATIENT
Start: 2023-08-30 | End: 2023-08-30

## 2023-08-30 RX ORDER — HYDRALAZINE HCL 50 MG
10 TABLET ORAL ONCE
Refills: 0 | Status: COMPLETED | OUTPATIENT
Start: 2023-08-30 | End: 2023-08-30

## 2023-08-30 RX ORDER — ENOXAPARIN SODIUM 100 MG/ML
40 INJECTION SUBCUTANEOUS EVERY 24 HOURS
Refills: 0 | Status: DISCONTINUED | OUTPATIENT
Start: 2023-08-30 | End: 2023-09-06

## 2023-08-30 RX ORDER — DEXAMETHASONE 0.5 MG/5ML
8 ELIXIR ORAL EVERY 8 HOURS
Refills: 0 | Status: DISCONTINUED | OUTPATIENT
Start: 2023-08-30 | End: 2023-08-31

## 2023-08-30 RX ORDER — OXYCODONE HYDROCHLORIDE 5 MG/1
5 TABLET ORAL EVERY 4 HOURS
Refills: 0 | Status: DISCONTINUED | OUTPATIENT
Start: 2023-08-30 | End: 2023-08-31

## 2023-08-30 RX ORDER — ACETAMINOPHEN 500 MG
975 TABLET ORAL ONCE
Refills: 0 | Status: COMPLETED | OUTPATIENT
Start: 2023-08-30 | End: 2023-08-30

## 2023-08-30 RX ORDER — CHLORHEXIDINE GLUCONATE 213 G/1000ML
15 SOLUTION TOPICAL ONCE
Refills: 0 | Status: COMPLETED | OUTPATIENT
Start: 2023-08-30 | End: 2023-08-30

## 2023-08-30 RX ORDER — SODIUM CHLORIDE 9 MG/ML
1000 INJECTION, SOLUTION INTRAVENOUS
Refills: 0 | Status: DISCONTINUED | OUTPATIENT
Start: 2023-08-30 | End: 2023-08-31

## 2023-08-30 RX ORDER — ACETAMINOPHEN 500 MG
1000 TABLET ORAL EVERY 6 HOURS
Refills: 0 | Status: DISCONTINUED | OUTPATIENT
Start: 2023-08-30 | End: 2023-08-31

## 2023-08-30 RX ORDER — LABETALOL HCL 100 MG
5 TABLET ORAL ONCE
Refills: 0 | Status: COMPLETED | OUTPATIENT
Start: 2023-08-30 | End: 2023-08-30

## 2023-08-30 RX ORDER — PANTOPRAZOLE SODIUM 20 MG/1
40 TABLET, DELAYED RELEASE ORAL DAILY
Refills: 0 | Status: DISCONTINUED | OUTPATIENT
Start: 2023-08-30 | End: 2023-09-05

## 2023-08-30 RX ORDER — SENNA PLUS 8.6 MG/1
2 TABLET ORAL AT BEDTIME
Refills: 0 | Status: DISCONTINUED | OUTPATIENT
Start: 2023-08-30 | End: 2023-09-06

## 2023-08-30 RX ORDER — AMPICILLIN SODIUM AND SULBACTAM SODIUM 250; 125 MG/ML; MG/ML
3 INJECTION, POWDER, FOR SUSPENSION INTRAMUSCULAR; INTRAVENOUS EVERY 6 HOURS
Refills: 0 | Status: COMPLETED | OUTPATIENT
Start: 2023-08-31 | End: 2023-08-31

## 2023-08-30 RX ORDER — HYDROMORPHONE HYDROCHLORIDE 2 MG/ML
0.5 INJECTION INTRAMUSCULAR; INTRAVENOUS; SUBCUTANEOUS EVERY 4 HOURS
Refills: 0 | Status: DISCONTINUED | OUTPATIENT
Start: 2023-08-30 | End: 2023-08-30

## 2023-08-30 RX ORDER — HYDROMORPHONE HYDROCHLORIDE 2 MG/ML
1 INJECTION INTRAMUSCULAR; INTRAVENOUS; SUBCUTANEOUS EVERY 4 HOURS
Refills: 0 | Status: DISCONTINUED | OUTPATIENT
Start: 2023-08-30 | End: 2023-08-30

## 2023-08-30 RX ADMIN — OXYCODONE HYDROCHLORIDE 2.5 MILLIGRAM(S): 5 TABLET ORAL at 19:33

## 2023-08-30 RX ADMIN — Medication 975 MILLIGRAM(S): at 08:04

## 2023-08-30 RX ADMIN — ENOXAPARIN SODIUM 40 MILLIGRAM(S): 100 INJECTION SUBCUTANEOUS at 17:32

## 2023-08-30 RX ADMIN — GABAPENTIN 200 MILLIGRAM(S): 400 CAPSULE ORAL at 21:39

## 2023-08-30 RX ADMIN — GABAPENTIN 600 MILLIGRAM(S): 400 CAPSULE ORAL at 07:04

## 2023-08-30 RX ADMIN — SODIUM CHLORIDE 100 MILLILITER(S): 9 INJECTION, SOLUTION INTRAVENOUS at 17:32

## 2023-08-30 RX ADMIN — SENNA PLUS 2 TABLET(S): 8.6 TABLET ORAL at 21:39

## 2023-08-30 RX ADMIN — Medication 101.6 MILLIGRAM(S): at 21:39

## 2023-08-30 RX ADMIN — OXYCODONE HYDROCHLORIDE 2.5 MILLIGRAM(S): 5 TABLET ORAL at 20:00

## 2023-08-30 RX ADMIN — SODIUM CHLORIDE 100 MILLILITER(S): 9 INJECTION, SOLUTION INTRAVENOUS at 19:32

## 2023-08-30 RX ADMIN — Medication 10 MILLIGRAM(S): at 17:52

## 2023-08-30 RX ADMIN — Medication 40 MILLIEQUIVALENT(S): at 21:38

## 2023-08-30 RX ADMIN — CHLORHEXIDINE GLUCONATE 15 MILLILITER(S): 213 SOLUTION TOPICAL at 07:05

## 2023-08-30 RX ADMIN — Medication 5 MILLIGRAM(S): at 17:32

## 2023-08-30 RX ADMIN — Medication 60 MICROGRAM(S): at 21:39

## 2023-08-30 RX ADMIN — AMPICILLIN SODIUM AND SULBACTAM SODIUM 200 GRAM(S): 250; 125 INJECTION, POWDER, FOR SUSPENSION INTRAMUSCULAR; INTRAVENOUS at 17:32

## 2023-08-30 RX ADMIN — Medication 400 MILLIGRAM(S): at 17:31

## 2023-08-30 RX ADMIN — Medication 10 MILLIGRAM(S): at 19:38

## 2023-08-30 RX ADMIN — APREPITANT 40 MILLIGRAM(S): 80 CAPSULE ORAL at 07:04

## 2023-08-30 RX ADMIN — Medication 975 MILLIGRAM(S): at 07:04

## 2023-08-30 RX ADMIN — Medication 400 MILLIGRAM(S): at 23:03

## 2023-08-30 RX ADMIN — AMPICILLIN SODIUM AND SULBACTAM SODIUM 200 GRAM(S): 250; 125 INJECTION, POWDER, FOR SUSPENSION INTRAMUSCULAR; INTRAVENOUS at 23:03

## 2023-08-30 RX ADMIN — CHLORHEXIDINE GLUCONATE 15 MILLILITER(S): 213 SOLUTION TOPICAL at 17:32

## 2023-08-30 NOTE — PROGRESS NOTE ADULT - ASSESSMENT
Assessment and Plan:  GIOVANNY CAMP is a 62yFemale with s/p right tongue resection, SND I-IV neck dissection, tracheostomy placement w/ [id number] and radial forearm free flap 8/30.    PLAN:  - ERAS protocol  - Pain meds (Standing tylenol q6 hrs, gabapentin 600mg qd, opioids as needed)  - Agitation meds as needed to avoid excessive neck movement  - NOTHING around neck (no trach ties or collars), do not cut trach sutures  - Flap checks per PRS  - Baci 2x/d to neck incisions  - Lovenox starting POD1  - Feeds POD1  - OOB POD1  - dc loza POD1  - On humidified trach collar, saturating well  - Suction PRN, routine trach management  - Unasyn x24h  - Peridex swish and spit BID  - Esomeprazole 20 mg BID  - Monitor BP, maintain MAPs ; avoid pressors   - Bowel regimen (senna + miralax)  - f/u CXR for NGT placement    Page ENT with any questions/concerns.  Peds Page #[id number]  Adult Page #[phone]    Leonor Mendoza  08-30-23 @ 21:41

## 2023-08-30 NOTE — PRE-OP CHECKLIST - ADVANCE DIRECTIVE ADDRESSED/READDRESSED
Nursing Note by Daxa Stevens LPN at 08/15/17 04:44 PM     Author:  Daxa Stevens LPN Service:  (none) Author Type:  Licensed Nurse     Filed:  08/15/17 04:45 PM Encounter Date:  8/15/2017 Status:  Signed     :  Daxa Stevens LPN (Licensed Nurse)            Roomed by: Daxa Welsh LPN    If provider orders tests at today's visit, patient would like to be contacted via[MM1.1T] telephone[MM1.1M] ([employer] or by telephone).  If to contact patient by phone, patient's preferred phone # is[MM1.1T] [phone] (cell)[MM1.2T]  and it is[MM1.1T] OK[MM1.1M] to leave message on voice mail or with family member.  If medications are ordered at today's visit, the pharmacy name/location patient would like them to be sent to is [facility] [address][MM1.1T]               Revision History        User Key Date/Time User Provider Type Action    > MM1.2 08/15/17 04:45 PM Daxa Stevens LPN Licensed Nurse Sign     MM1.1 08/15/17 04:44 PM Daxa Stevens LPN Licensed Nurse     M - Manual, T - Template             done

## 2023-08-30 NOTE — BRIEF OPERATIVE NOTE - OPERATION/FINDINGS
Tongue lesion removed with appropriate cuff of tissue, follow up frozen section negative.     Levels 1-IV neck dissection completed    Tracheostomy tube placed

## 2023-08-30 NOTE — CONSULT NOTE ADULT - ATTENDING COMMENTS
Suha is recovering s/p head and neck surgery with flap reconstruction. She requires q1 vascular checks and monitor of the pedicle flap  Hypothyroidism  -restart home levothyroxine, monitor BMP  Optimize nutrition- tube feeds  -may become hyperglycemic on bolus steroids - will consider NPH   Hypernatremia and hyperchloremia likely due to crystalloid   Pain control  Home meds  DVT prophylaxis     Critical Care Dx    The patient is a critical care patient with graft threatening hemodynamic instability in SICU.  I have personally interviewed and examined the patient, reviewed data and laboratory tests/x-rays and all pertinent electronic images.  The SICU team has a constant risk benefit analyzes discussion with the primary team, all consultants, House Staff and PA's on all decisions.   Time involved in performance of separately billable procedures was not counted toward my critical care time. There is no overlap.    I have personally provided 60 minutes of critical care time concurrently with the resident/fellow. This time excludes time spent on separate procedures and time spent teaching. I have reviewed the resident's/fellow's documentation and agree with the assessment and plan of care.  I was physically present for the key portions of the evaluation and management (E/M) service provided.      Javier Darling MD  Acute and Critical Care Surgery Suha is recovering s/p head and neck surgery with flap reconstruction. She requires q1 vascular checks and monitor of the pedicle flap  Hypothyroidism  -restart home levothyroxine, monitor BMP  Optimize nutrition- tube feeds  -may become hyperglycemic on bolus steroids - will consider NPH   Hypernatremia and hyperchloremia likely due to crystalloid   Monitor trach site, suction as needed  Pain control  Home meds  DVT prophylaxis     Critical Care Dx    The patient is a critical care patient with graft threatening hemodynamic instability in SICU.  I have personally interviewed and examined the patient, reviewed data and laboratory tests/x-rays and all pertinent electronic images.  The SICU team has a constant risk benefit analyzes discussion with the primary team, all consultants, House Staff and PA's on all decisions.   Time involved in performance of separately billable procedures was not counted toward my critical care time. There is no overlap.    I have personally provided 60 minutes of critical care time concurrently with the resident/fellow. This time excludes time spent on separate procedures and time spent teaching. I have reviewed the resident's/fellow's documentation and agree with the assessment and plan of care.  I was physically present for the key portions of the evaluation and management (E/M) service provided.      Javier Darling MD  Acute and Critical Care Surgery

## 2023-08-30 NOTE — BRIEF OPERATIVE NOTE - OPERATION/FINDINGS
R hemiglossectomy, neck dissection, trach by ENT  L radial forearm free flap to R hemiglossectomy by PRS

## 2023-08-30 NOTE — CONSULT NOTE ADULT - SUBJECTIVE AND OBJECTIVE BOX
SICU Consult Note  =====================================================  HPI:  63 y/o female with PMHx HLD, hypothyroidism, obesity, found to have malignant neoplasm of tongue. Patient is now s/p R hemiglossectomy, R neck dissection with radial freeflap and tracheostomy on 8/30. SICU consulted for q1hr neurochecks and trach management.     PAST MEDICAL & SURGICAL HISTORY:  Hypothyroidism  Hypercholesteremia  Osteoarthritis  H/O diverticulitis of colon  Morbid obesity  Malignant neoplasm of tongue, unspecified  Dyslipidemia  History of right hip replacement  10/2013  H/O squamous cell carcinoma excision  5/2008 left arm  H/O colonoscopy  7/2019  H/O basal cell carcinoma excision      ALLERGIES:  No Known Allergies      --------------------------------------------------------------------------------------    MEDICATIONS:    Neurologic Medications  acetaminophen   IVPB .. 1000 milliGRAM(s) IV Intermittent every 6 hours  gabapentin Solution 200 milliGRAM(s) Oral three times a day  HYDROmorphone  Injectable 0.5 milliGRAM(s) IV Push every 4 hours PRN Moderate Pain (4 - 6)  HYDROmorphone  Injectable 1 milliGRAM(s) IV Push every 4 hours PRN Severe Pain (7 - 10)  ondansetron Injectable 4 milliGRAM(s) IV Push every 6 hours PRN Nausea and/or Vomiting    Respiratory Medications    Cardiovascular Medications    Gastrointestinal Medications  lactated ringers. 1000 milliLiter(s) IV Continuous <Continuous>  pantoprazole  Injectable 40 milliGRAM(s) IV Push daily  polyethylene glycol 3350 17 Gram(s) Oral daily  senna 2 Tablet(s) Oral at bedtime  sodium chloride 0.9% lock flush 3 milliLiter(s) IV Push Once    Genitourinary Medications    Hematologic/Oncologic Medications  enoxaparin Injectable 40 milliGRAM(s) SubCutaneous every 24 hours    Antimicrobial/Immunologic Medications  ampicillin/sulbactam  IVPB 3 Gram(s) IV Intermittent once  ampicillin/sulbactam  IVPB        Endocrine/Metabolic Medications  dexAMETHasone  IVPB 8 milliGRAM(s) IV Intermittent every 8 hours  levothyroxine Injectable 60 MICROGram(s) IV Push at bedtime    Topical/Other Medications  chlorhexidine 0.12% Liquid 15 milliLiter(s) Swish and Spit two times a day    --------------------------------------------------------------------------------------    VITAL SIGNS:  ICU Vital Signs Last 24 Hrs  T(C): 37 (30 Aug 2023 06:31), Max: 37 (30 Aug 2023 06:31)  T(F): 98.6 (30 Aug 2023 06:31), Max: 98.6 (30 Aug 2023 06:31)  HR: 66 (30 Aug 2023 06:31) (66 - 66)  BP: 158/71 (30 Aug 2023 06:31) (158/71 - 158/71)  RR: 16 (30 Aug 2023 06:31) (16 - 16)  SpO2: 96% (30 Aug 2023 06:31) (96% - 96%)      --------------------------------------------------------------------------------------    EXAM  NEUROLOGY  Exam: Normal, in no acute distress.  Alert and oriented x4.  flap viable     ENT:  Exam: R neck incision c/d/i with staples. ISABELL x2SS.     RESPIRATORY  Exam: Normal expansion/effort. trach in place     CARDIOVASCULAR  Exam: S1, S2.  Regular rate and rhythm.       GI/NUTRITION  Exam: Abdomen soft, Non-tender, Non-distended.    Current Diet: NPO    MUSCULOSKELETAL  Exam: All extremities moving spontaneously. R arm with vac in place       METABOLIC / FLUIDS / ELECTROLYTES  lactated ringers. 1000 milliLiter(s) IV Continuous <Continuous>  sodium chloride 0.9% lock flush 3 milliLiter(s) IV Push Once      HEMATOLOGIC  [x] VTE Prophylaxis: enoxaparin Injectable 40 milliGRAM(s) SubCutaneous every 24 hours      INFECTIOUS DISEASE  Antimicrobials/Immunologic Medications:  ampicillin/sulbactam  IVPB 3 Gram(s) IV Intermittent once  ampicillin/sulbactam  IVPB          --------------------------------------------------------------------------------------

## 2023-08-30 NOTE — PROGRESS NOTE ADULT - SUBJECTIVE AND OBJECTIVE BOX
OTOLARYNGOLOGY (ENT) PROGRESS NOTE    PATIENT: GIOVANNY CAMP  MRN: [id number]  : [birth year]  [id number]  DATE OF SERVICE:  23  			         ID:GIOVANNY CAMP is a  62yFemale with s/p right tongue resection, SND I-IV neck dissection, tracheostomy placement w/ 6CN75R and radial forearm free flap . Transferred from OR to SICU for flap monitoring.     ALLERGIES:  No Known Allergies      MEDICATIONS:  Antiinfectives:   ampicillin/sulbactam  IVPB        IV fluids:  lactated ringers. 1000 milliLiter(s) IV Continuous <Continuous>  sodium chloride 0.9% lock flush 3 milliLiter(s) IV Push Once    Hematologic/Anticoagulation:  enoxaparin Injectable 40 milliGRAM(s) SubCutaneous every 24 hours    Pain medications/Neuro:  acetaminophen   IVPB .. 1000 milliGRAM(s) IV Intermittent every 6 hours  gabapentin Solution 200 milliGRAM(s) Oral three times a day  ondansetron Injectable 4 milliGRAM(s) IV Push every 6 hours PRN  oxyCODONE    Solution 2.5 milliGRAM(s) Oral every 4 hours PRN  oxyCODONE    Solution 5 milliGRAM(s) Oral every 4 hours PRN    Endocrine Medications:   dexAMETHasone  IVPB 8 milliGRAM(s) IV Intermittent every 8 hours  levothyroxine Injectable 60 MICROGram(s) IV Push at bedtime    All other standing medications:   chlorhexidine 0.12% Liquid 15 milliLiter(s) Swish and Spit two times a day  pantoprazole  Injectable 40 milliGRAM(s) IV Push daily  polyethylene glycol 3350 17 Gram(s) Oral daily  senna 2 Tablet(s) Oral at bedtime    All other PRN medications:    Vital Signs Last 24 Hrs  T(C): 36.1 (30 Aug 2023 20:00), Max: 37 (30 Aug 2023 06:31)  T(F): 96.9 (30 Aug 2023 20:00), Max: 98.6 (30 Aug 2023 06:31)  HR: 72 (30 Aug 2023 21:00) (57 - 78)  BP: 158/71 (30 Aug 2023 06:31) (158/71 - 158/71)  BP(mean): --  RR: 15 (30 Aug 2023 21:00) (10 - 22)  SpO2: 96% (30 Aug 2023 21:00) (94% - 99%)    Parameters below as of 30 Aug 2023 20:00  Patient On (Oxygen Delivery Method): tracheostomy collar  O2 Flow (L/min): 6  O2 Concentration (%): 28       @ 07:01  -   @ 21:41  --------------------------------------------------------  IN:    IV PiggyBack: 200 mL    Lactated Ringers: 500 mL  Total IN: 700 mL    OUT:    Bulb (mL): 20 mL    Bulb (mL): 0 mL    Indwelling Catheter - Urethral (mL): 335 mL    VAC (Vacuum Assisted Closure) System (mL): 0 mL  Total OUT: 355 mL    Total NET: 345 mL          23 @ 07:01  -  23 @ 21:41  --------------------------------------------------------  IN:  Total IN: 0 mL    OUT:    Bulb (mL): 20 mL    Bulb (mL): 0 mL    VAC (Vacuum Assisted Closure) System (mL): 0 mL  Total OUT: 20 mL    Total NET: -20 mL            NAD, resting comfortably in bed   NGT sutured into R nares  6CN75R trach in place, secured with sutures  Blood tinged secretions, no active bleeding  Soft suction passes easily, minimal mucoid secretions  Neck soft, flat, no hematoma or crepitus noted  On trach collar  Neck flat and supple, no collection. Incision closed with staples c/d/i, JPx1 w/ SS output, holding suction  Cook doppler with strong arterial signal. Intraoral skin paddle pink, well perfused. Incisions intact.  OC/OP: no erythema, bleeding, laceration         LABS                       12.5   12.58 )-----------( 229      ( 30 Aug 2023 19:43 )             38.9    08-30    147<H>  |  112<H>  |  9   ----------------------------<  172<H>  3.5   |  21<L>  |  0.72    Ca    9.2      30 Aug 2023 19:43  Phos  3.0       Mg     2.10                Coagulation Studies-   PT/INR - ( 30 Aug 2023 19:43 )   PT: 10.6 sec;   INR: 0.94 ratio         PTT - ( 30 Aug 2023 19:43 )  PTT:29.4 sec  Urinalysis Basic - ( 30 Aug 2023 19:43 )    Color: x / Appearance: x / SG: x / pH: x  Gluc: 172 mg/dL / Ketone: x  / Bili: x / Urobili: x   Blood: x / Protein: x / Nitrite: x   Leuk Esterase: x / RBC: x / WBC x   Sq Epi: x / Non Sq Epi: x / Bacteria: x      Endocrine Panel-  Calcium: 9.2 mg/dL ( @ 19:43)                MICROBIOLOGY:        RADIOLOGY & ADDITIONAL STUDIES:   OTOLARYNGOLOGY (ENT) PROGRESS NOTE    PATIENT: GIOVANNY CAMP  MRN: [id number]  : [birth year]  [id number]  DATE OF SERVICE:  23  			         ID:GIOVANNY CAMP is a  62yFemale with s/p right tongue resection, SND I-IV neck dissection, tracheostomy placement w/ 6CN75R and radial forearm free flap . Transferred from OR to SICU for flap monitoring. AF, s/p labetalol 5mg x1 for hypertension. LEXY.    ALLERGIES:  No Known Allergies      MEDICATIONS:  Antiinfectives:   ampicillin/sulbactam  IVPB        IV fluids:  lactated ringers. 1000 milliLiter(s) IV Continuous <Continuous>  sodium chloride 0.9% lock flush 3 milliLiter(s) IV Push Once    Hematologic/Anticoagulation:  enoxaparin Injectable 40 milliGRAM(s) SubCutaneous every 24 hours    Pain medications/Neuro:  acetaminophen   IVPB .. 1000 milliGRAM(s) IV Intermittent every 6 hours  gabapentin Solution 200 milliGRAM(s) Oral three times a day  ondansetron Injectable 4 milliGRAM(s) IV Push every 6 hours PRN  oxyCODONE    Solution 2.5 milliGRAM(s) Oral every 4 hours PRN  oxyCODONE    Solution 5 milliGRAM(s) Oral every 4 hours PRN    Endocrine Medications:   dexAMETHasone  IVPB 8 milliGRAM(s) IV Intermittent every 8 hours  levothyroxine Injectable 60 MICROGram(s) IV Push at bedtime    All other standing medications:   chlorhexidine 0.12% Liquid 15 milliLiter(s) Swish and Spit two times a day  pantoprazole  Injectable 40 milliGRAM(s) IV Push daily  polyethylene glycol 3350 17 Gram(s) Oral daily  senna 2 Tablet(s) Oral at bedtime    All other PRN medications:    Vital Signs Last 24 Hrs  T(C): 36.1 (30 Aug 2023 20:00), Max: 37 (30 Aug 2023 06:31)  T(F): 96.9 (30 Aug 2023 20:00), Max: 98.6 (30 Aug 2023 06:31)  HR: 72 (30 Aug 2023 21:00) (57 - 78)  BP: 158/71 (30 Aug 2023 06:31) (158/71 - 158/71)  BP(mean): --  RR: 15 (30 Aug 2023 21:00) (10 - 22)  SpO2: 96% (30 Aug 2023 21:00) (94% - 99%)    Parameters below as of 30 Aug 2023 20:00  Patient On (Oxygen Delivery Method): tracheostomy collar  O2 Flow (L/min): 6  O2 Concentration (%): 28       @ 07:01  -   @ 21:41  --------------------------------------------------------  IN:    IV PiggyBack: 200 mL    Lactated Ringers: 500 mL  Total IN: 700 mL    OUT:    Bulb (mL): 20 mL    Bulb (mL): 0 mL    Indwelling Catheter - Urethral (mL): 335 mL    VAC (Vacuum Assisted Closure) System (mL): 0 mL  Total OUT: 355 mL    Total NET: 345 mL          23 @ 07:01  -  23 @ 21:41  --------------------------------------------------------  IN:  Total IN: 0 mL    OUT:    Bulb (mL): 20 mL    Bulb (mL): 0 mL    VAC (Vacuum Assisted Closure) System (mL): 0 mL  Total OUT: 20 mL    Total NET: -20 mL            NAD, resting comfortably in bed   NGT sutured into R nares  6CN75R trach in place, secured with sutures  Blood tinged secretions, no active bleeding  Soft suction passes easily, minimal mucoid secretions  Neck soft, flat, no hematoma or crepitus noted  On trach collar  Neck flat and supple, no collection. Incision closed with staples c/d/i, JPx1 w/ SS output, holding suction  Cook doppler with strong arterial signal. Intraoral skin paddle pink, well perfused. Incisions intact.  OC/OP: no erythema, bleeding, laceration  R forearm in ACE bandage, wound vac in place         LABS                       12.5   12.58 )-----------( 229      ( 30 Aug 2023 19:43 )             38.9        147<H>  |  112<H>  |  9   ----------------------------<  172<H>  3.5   |  21<L>  |  0.72    Ca    9.2      30 Aug 2023 19:43  Phos  3.0       Mg     2.10                Coagulation Studies-   PT/INR - ( 30 Aug 2023 19:43 )   PT: 10.6 sec;   INR: 0.94 ratio         PTT - ( 30 Aug 2023 19:43 )  PTT:29.4 sec  Urinalysis Basic - ( 30 Aug 2023 19:43 )    Color: x / Appearance: x / SG: x / pH: x  Gluc: 172 mg/dL / Ketone: x  / Bili: x / Urobili: x   Blood: x / Protein: x / Nitrite: x   Leuk Esterase: x / RBC: x / WBC x   Sq Epi: x / Non Sq Epi: x / Bacteria: x      Endocrine Panel-  Calcium: 9.2 mg/dL ( @ 19:43)                MICROBIOLOGY:        RADIOLOGY & ADDITIONAL STUDIES:

## 2023-08-30 NOTE — CONSULT NOTE ADULT - ASSESSMENT
63 y/o female with PMHx HLD, hypothyroidism, obesity, found to have malignant neoplasm of tongue. Patient is now s/p R hemiglossectomy, R neck dissection with radial freeflap and tracheostomy on 8/30. SICU consulted for q1hr neurochecks and trach management.     PLAN:   Neurologic:   - pain control prn- tylenol, oxy, gabapentin  - A&OX4, no issues     Respiratory: trach   - trach   - maintain SPO2 > 92%    Cardiovascular:   - MAP > 65  - no pressor requirements   - continue home statin     Gastrointestinal/Nutrition:   - Diet: NPO/IVF  - protonix IV daily   - bowel regimen  - f/u CXR for KO placement   - zofran prn     Renal/Genitourinary:   - indwelling loza   - monitor I&Os  - monitor electrolytes, replete prn    Hematologic:   - monitor H/H on daily CBC  - DVT ppx: Lovenox     Infectious Disease:   - unasyn post-op  - monitor WBC and trend fever curve     Endocrine:   - decadron 8q8hr x3 doses post-op  - continue home levothyroxine   - monitor glucose on BMP (goal 140)    Disposition: SICU

## 2023-08-31 LAB
ANION GAP SERPL CALC-SCNC: 15 MMOL/L — HIGH (ref 7–14)
BLD GP AB SCN SERPL QL: NEGATIVE — SIGNIFICANT CHANGE UP
BUN SERPL-MCNC: 8 MG/DL — SIGNIFICANT CHANGE UP (ref 7–23)
CALCIUM SERPL-MCNC: 9.3 MG/DL — SIGNIFICANT CHANGE UP (ref 8.4–10.5)
CHLORIDE SERPL-SCNC: 111 MMOL/L — HIGH (ref 98–107)
CO2 SERPL-SCNC: 21 MMOL/L — LOW (ref 22–31)
CREAT SERPL-MCNC: 0.73 MG/DL — SIGNIFICANT CHANGE UP (ref 0.5–1.3)
EGFR: 93 ML/MIN/1.73M2 — SIGNIFICANT CHANGE UP
GLUCOSE SERPL-MCNC: 154 MG/DL — HIGH (ref 70–99)
HCT VFR BLD CALC: 37.7 % — SIGNIFICANT CHANGE UP (ref 34.5–45)
HGB BLD-MCNC: 12.1 G/DL — SIGNIFICANT CHANGE UP (ref 11.5–15.5)
LACTATE SERPL-SCNC: 1.9 MMOL/L — SIGNIFICANT CHANGE UP (ref 0.5–2)
MAGNESIUM SERPL-MCNC: 2.1 MG/DL — SIGNIFICANT CHANGE UP (ref 1.6–2.6)
MCHC RBC-ENTMCNC: 30.6 PG — SIGNIFICANT CHANGE UP (ref 27–34)
MCHC RBC-ENTMCNC: 32.1 GM/DL — SIGNIFICANT CHANGE UP (ref 32–36)
MCV RBC AUTO: 95.2 FL — SIGNIFICANT CHANGE UP (ref 80–100)
NRBC # BLD: 0 /100 WBCS — SIGNIFICANT CHANGE UP (ref 0–0)
NRBC # FLD: 0 K/UL — SIGNIFICANT CHANGE UP (ref 0–0)
PHOSPHATE SERPL-MCNC: 2 MG/DL — LOW (ref 2.5–4.5)
PLATELET # BLD AUTO: 225 K/UL — SIGNIFICANT CHANGE UP (ref 150–400)
POTASSIUM SERPL-MCNC: 3.9 MMOL/L — SIGNIFICANT CHANGE UP (ref 3.5–5.3)
POTASSIUM SERPL-SCNC: 3.9 MMOL/L — SIGNIFICANT CHANGE UP (ref 3.5–5.3)
RBC # BLD: 3.96 M/UL — SIGNIFICANT CHANGE UP (ref 3.8–5.2)
RBC # FLD: 13.5 % — SIGNIFICANT CHANGE UP (ref 10.3–14.5)
RH IG SCN BLD-IMP: POSITIVE — SIGNIFICANT CHANGE UP
SODIUM SERPL-SCNC: 147 MMOL/L — HIGH (ref 135–145)
WBC # BLD: 12.1 K/UL — HIGH (ref 3.8–10.5)
WBC # FLD AUTO: 12.1 K/UL — HIGH (ref 3.8–10.5)

## 2023-08-31 PROCEDURE — 99232 SBSQ HOSP IP/OBS MODERATE 35: CPT | Mod: GC

## 2023-08-31 RX ORDER — POTASSIUM PHOSPHATE, MONOBASIC POTASSIUM PHOSPHATE, DIBASIC 236; 224 MG/ML; MG/ML
30 INJECTION, SOLUTION INTRAVENOUS ONCE
Refills: 0 | Status: COMPLETED | OUTPATIENT
Start: 2023-08-31 | End: 2023-08-31

## 2023-08-31 RX ORDER — OXYCODONE HYDROCHLORIDE 5 MG/1
5 TABLET ORAL EVERY 4 HOURS
Refills: 0 | Status: DISCONTINUED | OUTPATIENT
Start: 2023-08-31 | End: 2023-09-05

## 2023-08-31 RX ORDER — CHLORHEXIDINE GLUCONATE 213 G/1000ML
1 SOLUTION TOPICAL DAILY
Refills: 0 | Status: DISCONTINUED | OUTPATIENT
Start: 2023-08-31 | End: 2023-09-03

## 2023-08-31 RX ORDER — OXYCODONE HYDROCHLORIDE 5 MG/1
10 TABLET ORAL EVERY 4 HOURS
Refills: 0 | Status: DISCONTINUED | OUTPATIENT
Start: 2023-08-31 | End: 2023-09-05

## 2023-08-31 RX ORDER — ACETAMINOPHEN 500 MG
1000 TABLET ORAL EVERY 6 HOURS
Refills: 0 | Status: DISCONTINUED | OUTPATIENT
Start: 2023-08-31 | End: 2023-09-06

## 2023-08-31 RX ORDER — SODIUM,POTASSIUM PHOSPHATES 278-250MG
1 POWDER IN PACKET (EA) ORAL ONCE
Refills: 0 | Status: COMPLETED | OUTPATIENT
Start: 2023-08-31 | End: 2023-08-31

## 2023-08-31 RX ORDER — ATORVASTATIN CALCIUM 80 MG/1
10 TABLET, FILM COATED ORAL AT BEDTIME
Refills: 0 | Status: DISCONTINUED | OUTPATIENT
Start: 2023-08-31 | End: 2023-09-04

## 2023-08-31 RX ADMIN — Medication 400 MILLIGRAM(S): at 06:04

## 2023-08-31 RX ADMIN — Medication 1000 MILLIGRAM(S): at 17:56

## 2023-08-31 RX ADMIN — OXYCODONE HYDROCHLORIDE 5 MILLIGRAM(S): 5 TABLET ORAL at 08:03

## 2023-08-31 RX ADMIN — Medication 101.6 MILLIGRAM(S): at 06:04

## 2023-08-31 RX ADMIN — GABAPENTIN 200 MILLIGRAM(S): 400 CAPSULE ORAL at 06:05

## 2023-08-31 RX ADMIN — CHLORHEXIDINE GLUCONATE 15 MILLILITER(S): 213 SOLUTION TOPICAL at 17:55

## 2023-08-31 RX ADMIN — ENOXAPARIN SODIUM 40 MILLIGRAM(S): 100 INJECTION SUBCUTANEOUS at 17:56

## 2023-08-31 RX ADMIN — PANTOPRAZOLE SODIUM 40 MILLIGRAM(S): 20 TABLET, DELAYED RELEASE ORAL at 11:41

## 2023-08-31 RX ADMIN — AMPICILLIN SODIUM AND SULBACTAM SODIUM 200 GRAM(S): 250; 125 INJECTION, POWDER, FOR SUSPENSION INTRAMUSCULAR; INTRAVENOUS at 11:41

## 2023-08-31 RX ADMIN — Medication 60 MICROGRAM(S): at 22:18

## 2023-08-31 RX ADMIN — ATORVASTATIN CALCIUM 10 MILLIGRAM(S): 80 TABLET, FILM COATED ORAL at 22:17

## 2023-08-31 RX ADMIN — Medication 1000 MILLIGRAM(S): at 18:30

## 2023-08-31 RX ADMIN — OXYCODONE HYDROCHLORIDE 5 MILLIGRAM(S): 5 TABLET ORAL at 23:17

## 2023-08-31 RX ADMIN — OXYCODONE HYDROCHLORIDE 5 MILLIGRAM(S): 5 TABLET ORAL at 22:17

## 2023-08-31 RX ADMIN — Medication 1 PACKET(S): at 06:04

## 2023-08-31 RX ADMIN — Medication 1000 MILLIGRAM(S): at 00:19

## 2023-08-31 RX ADMIN — AMPICILLIN SODIUM AND SULBACTAM SODIUM 200 GRAM(S): 250; 125 INJECTION, POWDER, FOR SUSPENSION INTRAMUSCULAR; INTRAVENOUS at 06:05

## 2023-08-31 RX ADMIN — GABAPENTIN 200 MILLIGRAM(S): 400 CAPSULE ORAL at 13:01

## 2023-08-31 RX ADMIN — OXYCODONE HYDROCHLORIDE 5 MILLIGRAM(S): 5 TABLET ORAL at 08:30

## 2023-08-31 RX ADMIN — SENNA PLUS 2 TABLET(S): 8.6 TABLET ORAL at 22:17

## 2023-08-31 RX ADMIN — CHLORHEXIDINE GLUCONATE 1 APPLICATION(S): 213 SOLUTION TOPICAL at 13:02

## 2023-08-31 RX ADMIN — Medication 1000 MILLIGRAM(S): at 12:30

## 2023-08-31 RX ADMIN — AMPICILLIN SODIUM AND SULBACTAM SODIUM 200 GRAM(S): 250; 125 INJECTION, POWDER, FOR SUSPENSION INTRAMUSCULAR; INTRAVENOUS at 17:56

## 2023-08-31 RX ADMIN — GABAPENTIN 200 MILLIGRAM(S): 400 CAPSULE ORAL at 22:17

## 2023-08-31 RX ADMIN — Medication 1000 MILLIGRAM(S): at 06:26

## 2023-08-31 RX ADMIN — CHLORHEXIDINE GLUCONATE 15 MILLILITER(S): 213 SOLUTION TOPICAL at 06:04

## 2023-08-31 RX ADMIN — POTASSIUM PHOSPHATE, MONOBASIC POTASSIUM PHOSPHATE, DIBASIC 83.33 MILLIMOLE(S): 236; 224 INJECTION, SOLUTION INTRAVENOUS at 04:22

## 2023-08-31 RX ADMIN — Medication 1000 MILLIGRAM(S): at 11:42

## 2023-08-31 NOTE — PHYSICAL THERAPY INITIAL EVALUATION ADULT - GENERAL OBSERVATIONS, REHAB EVAL
Pt found semi reclined in bed; +telemetry monitor; +ISABELL; +left Upper Extremity wound vac; +trach; patient agreeable to PT. Pt non verbal due to surgery but able to mouth words. .

## 2023-08-31 NOTE — PHYSICAL THERAPY INITIAL EVALUATION ADULT - PERTINENT HX OF CURRENT PROBLEM, REHAB EVAL
62 year old female presents for preop eval for scheduled right glossectomy, floor of mouth resection, limited pharyngectomy right neck dissection, tracheostomy.  Pt states few months ago seen in urgent care for sinus infection and was referred to ENT for further eval on small whitish spot to right side of tongue.  Referred to Dr Davenport.  Biopsy done.  Preop dx malignant neoplasm of tongue unspecified.

## 2023-08-31 NOTE — PROGRESS NOTE ADULT - SUBJECTIVE AND OBJECTIVE BOX
ENT Progress Note  Interval:  Patient seen and examined at bedside. Transferred to SICU overnight. Pain well controlled.      PAST MEDICAL & SURGICAL HISTORY:  Hypothyroidism      Hypercholesteremia      Osteoarthritis      H/O diverticulitis of colon      Morbid obesity      Malignant neoplasm of tongue, unspecified      Dyslipidemia      History of right hip replacement  10/2013      H/O squamous cell carcinoma excision  5/2008 left arm      H/O colonoscopy  7/2019      H/O basal cell carcinoma excision        Allergies    No Known Allergies    Intolerances      MEDICATIONS  (STANDING):  acetaminophen   IVPB .. 1000 milliGRAM(s) IV Intermittent every 6 hours  ampicillin/sulbactam  IVPB 3 Gram(s) IV Intermittent every 6 hours  ampicillin/sulbactam  IVPB      chlorhexidine 0.12% Liquid 15 milliLiter(s) Swish and Spit two times a day  dexAMETHasone  IVPB 8 milliGRAM(s) IV Intermittent every 8 hours  enoxaparin Injectable 40 milliGRAM(s) SubCutaneous every 24 hours  gabapentin Solution 200 milliGRAM(s) Oral three times a day  lactated ringers. 1000 milliLiter(s) (100 mL/Hr) IV Continuous <Continuous>  levothyroxine Injectable 60 MICROGram(s) IV Push at bedtime  pantoprazole  Injectable 40 milliGRAM(s) IV Push daily  polyethylene glycol 3350 17 Gram(s) Oral daily  senna 2 Tablet(s) Oral at bedtime  sodium chloride 0.9% lock flush 3 milliLiter(s) IV Push Once    MEDICATIONS  (PRN):  ondansetron Injectable 4 milliGRAM(s) IV Push every 6 hours PRN Nausea and/or Vomiting  oxyCODONE    Solution 2.5 milliGRAM(s) Oral every 4 hours PRN Moderate Pain (4 - 6)  oxyCODONE    Solution 5 milliGRAM(s) Oral every 4 hours PRN Severe Pain (7 - 10)        Vital Signs Last 24 Hrs  T(C): 37 (31 Aug 2023 04:00), Max: 37 (31 Aug 2023 04:00)  T(F): 98.6 (31 Aug 2023 04:00), Max: 98.6 (31 Aug 2023 04:00)  HR: 72 (31 Aug 2023 06:00) (57 - 78)  BP: --  BP(mean): --  RR: 24 (31 Aug 2023 06:00) (10 - 24)  SpO2: 95% (31 Aug 2023 06:00) (94% - 99%)    Parameters below as of 31 Aug 2023 04:00  Patient On (Oxygen Delivery Method): tracheostomy collar  O2 Flow (L/min): 6  O2 Concentration (%): 28    Physical Exam:  NAD, resting comfortably in bed   NGT sutured into R nares  6CN75R trach in place, secured with sutures  Blood tinged secretions, no active bleeding  Soft suction passes easily, minimal mucoid secretions  Neck soft, flat, no hematoma or crepitus noted  On trach collar  Neck flat and supple, no collection. Incision closed with staples c/d/i, JPx1 w/ SS output, holding suction  Cook doppler with strong arterial signal. Intraoral skin paddle pink, well perfused. Incisions intact.  OC/OP: no erythema, bleeding, laceration  R forearm in ACE bandage, wound vac in place      08-30-23 @ 07:01  -  08-31-23 @ 06:34  --------------------------------------------------------  IN: 2700 mL / OUT: 1290 mL / NET: 1410 mL                              12.1   12.10 )-----------( 225      ( 31 Aug 2023 00:30 )             37.7    08-31    147<H>  |  111<H>  |  8   ----------------------------<  154<H>  3.9   |  21<L>  |  0.73    Ca    9.3      31 Aug 2023 00:30  Phos  2.0     08-31  Mg     2.10     08-31     PT/INR - ( 30 Aug 2023 19:43 )   PT: 10.6 sec;   INR: 0.94 ratio         PTT - ( 30 Aug 2023 19:43 )  PTT:29.4 sec      A/P: 62yFemale with s/p right tongue resection, SND I-IV neck dissection, tracheostomy placement w/ 6CN75R and radial forearm free flap 8/30. CXR confirmed NGT in place.    PLAN:  - [place] protocol  - Pain meds (Standing tylenol q6 hrs, gabapentin 600mg qd, opioids as needed)  - Agitation meds as needed to avoid excessive neck movement  - NOTHING around neck (no trach ties or collars), do not cut trach sutures  - Flap checks per PRS  - Baci 2x/d to neck incisions  - Lovenox starting POD1  - Feeds POD1  - OOB POD1  - dc loza POD1  - On humidified trach collar, saturating well  - Suction PRN, routine trach management  - Unasyn x24h  - Peridex swish and spit BID  - Esomeprazole 20 mg BID  - Monitor BP, maintain MAPs ; avoid pressors   - Bowel regimen (senna + miralax)

## 2023-08-31 NOTE — PROGRESS NOTE ADULT - SUBJECTIVE AND OBJECTIVE BOX
24 HOUR EVENTS:  - SBP >140, s/p labetolol 5, hydral 10    SUBJECTIVE/ROS:  No complaints    NEURO  Exam: AOx4, NAD, resting comfortably in bed  Meds: acetaminophen   IVPB .. 1000 milliGRAM(s) IV Intermittent every 6 hours  gabapentin Solution 200 milliGRAM(s) Oral three times a day  ondansetron Injectable 4 milliGRAM(s) IV Push every 6 hours PRN Nausea and/or Vomiting  oxyCODONE    Solution 2.5 milliGRAM(s) Oral every 4 hours PRN Moderate Pain (4 - 6)  oxyCODONE    Solution 5 milliGRAM(s) Oral every 4 hours PRN Severe Pain (7 - 10)    NECK: soft, flat and supple, no collection. Incision closed with staples c/d/i, JPx1 w/ SS output, holding suction    RESPIRATORY  RR: 14 (08-31-23 @ 02:00) (10 - 22)  SpO2: 97% (08-31-23 @ 02:00) (94% - 99%)  Wt(kg): --  Exam: trach collar, CTA b/l. No murmurs, rubs, gallops appreciated.   Mechanical Ventilation:   ABG - ( 31 Aug 2023 00:30 )  pH: x     /  pCO2: x     /  pO2: x     / HCO3: x     / Base Excess: x     /  SaO2: x       Lactate: 1.9  Meds:       CARDIOVASCULAR  HR: 68 (08-31-23 @ 02:00) (57 - 78)  BP: 158/71 (08-30-23 @ 06:31) (158/71 - 158/71)  BP(mean): --  ABP: 159/83 (08-31-23 @ 02:00) (131/67 - 179/87)  ABP(mean): 114 (08-31-23 @ 02:00) (91 - 131)  Wt(kg): --  CVP(cm H2O): --  Lactate, Blood: 1.9 mmol/L (08-31 @ 00:30)  Exam: S1S2. No murmurs, rubs, gallops appreciated.  Cardiac Rhythm: NSR  Meds:       GI/NUTRITION  Exam: Soft, non-distended, non-tender   Diet: NPO/NGTube  Meds: pantoprazole  Injectable 40 milliGRAM(s) IV Push daily  polyethylene glycol 3350 17 Gram(s) Oral daily  senna 2 Tablet(s) Oral at bedtime      GENITOURINARY  I&O's Detail    08-30 @ 07:01  -  08-31 @ 02:44  --------------------------------------------------------  IN:    IV PiggyBack: 450 mL    Lactated Ringers: 1000 mL  Total IN: 1450 mL    OUT:    Bulb (mL): 40 mL    Bulb (mL): 0 mL    Indwelling Catheter - Urethral (mL): 810 mL    VAC (Vacuum Assisted Closure) System (mL): 0 mL  Total OUT: 850 mL    Total NET: 600 mL      Weight (kg): 114.8 (08-30 @ 06:31)  08-31    147<H>  |  111<H>  |  8   ----------------------------<  154<H>  3.9   |  21<L>  |  0.73    Ca    9.3      31 Aug 2023 00:30  Phos  2.0     08-31  Mg     2.10     08-31      [x ] Hoang catheter, indication: N/A  Meds: lactated ringers. 1000 milliLiter(s) IV Continuous <Continuous>  potassium phosphate / sodium phosphate Powder (PHOS-NaK) 1 Packet(s) Oral once  potassium phosphate IVPB 30 milliMole(s) IV Intermittent once  sodium chloride 0.9% lock flush 3 milliLiter(s) IV Push Once      HEMATOLOGIC  Meds: enoxaparin Injectable 40 milliGRAM(s) SubCutaneous every 24 hours    [x] VTE Prophylaxis                        12.1   12.10 )-----------( 225      ( 31 Aug 2023 00:30 )             37.7     PT/INR - ( 30 Aug 2023 19:43 )   PT: 10.6 sec;   INR: 0.94 ratio         PTT - ( 30 Aug 2023 19:43 )  PTT:29.4 sec  Transfusion     [ ] PRBC   [ ] Platelets   [ ] FFP   [ ] Cryoprecipitate      INFECTIOUS DISEASES  T(C): 35.9 (08-31-23 @ 00:00), Max: 37 (08-30-23 @ 06:31)  Wt(kg): --  WBC Count: 12.10 K/uL (08-31 @ 00:30)  WBC Count: 12.58 K/uL (08-30 @ 19:43)    Recent Cultures:    Meds: ampicillin/sulbactam  IVPB 3 Gram(s) IV Intermittent every 6 hours  ampicillin/sulbactam  IVPB          ENDOCRINE  Capillary Blood Glucose    Meds: dexAMETHasone  IVPB 8 milliGRAM(s) IV Intermittent every 8 hours  levothyroxine Injectable 60 MICROGram(s) IV Push at bedtime      ACCESS DEVICES:  [x] Peripheral IV  [ ] Central Venous Line	[ ] R	[ ] L	[ ] IJ	[ ] Fem	[ ] SC	Placed:   [ ] Arterial Line		[ ] R	[ ] L	[ ] Fem	[ ] Rad	[ ] Ax	Placed:   [ ] PICC:					[ ] Mediport  [ x] Urinary Catheter, Date Placed:   [ ] Necessity of urinary, arterial, and venous catheters discussed    OTHER MEDICATIONS:  chlorhexidine 0.12% Liquid 15 milliLiter(s) Swish and Spit two times a day      CODE STATUS: full    IMAGING: 24 HOUR EVENTS:  - SBP >140, s/p labetolol 5, hydral 10    SUBJECTIVE/ROS:  Resting comfortably. Complaining of mild pain at her trach site. No other complaints. Denies fevers, chills, chest pain, palpitations.     NEURO  Exam: AOx4, NAD, resting comfortably in bed  Meds: acetaminophen   IVPB .. 1000 milliGRAM(s) IV Intermittent every 6 hours  gabapentin Solution 200 milliGRAM(s) Oral three times a day  ondansetron Injectable 4 milliGRAM(s) IV Push every 6 hours PRN Nausea and/or Vomiting  oxyCODONE    Solution 2.5 milliGRAM(s) Oral every 4 hours PRN Moderate Pain (4 - 6)  oxyCODONE    Solution 5 milliGRAM(s) Oral every 4 hours PRN Severe Pain (7 - 10)    NECK: soft, flat and supple, no collection. Incision closed with staples c/d/i, JPx1 w/ SS output, holding suction    RESPIRATORY  RR: 14 (08-31-23 @ 02:00) (10 - 22)  SpO2: 97% (08-31-23 @ 02:00) (94% - 99%)  Wt(kg): --  Exam: trach collar, CTA b/l. No murmurs, rubs, gallops appreciated.   Mechanical Ventilation:   ABG - ( 31 Aug 2023 00:30 )  pH: x     /  pCO2: x     /  pO2: x     / HCO3: x     / Base Excess: x     /  SaO2: x       Lactate: 1.9  Meds:       CARDIOVASCULAR  HR: 68 (08-31-23 @ 02:00) (57 - 78)  BP: 158/71 (08-30-23 @ 06:31) (158/71 - 158/71)  BP(mean): --  ABP: 159/83 (08-31-23 @ 02:00) (131/67 - 179/87)  ABP(mean): 114 (08-31-23 @ 02:00) (91 - 131)  Wt(kg): --  CVP(cm H2O): --  Lactate, Blood: 1.9 mmol/L (08-31 @ 00:30)  Exam: S1S2. No murmurs, rubs, gallops appreciated.  Cardiac Rhythm: NSR  Meds:       GI/NUTRITION  Exam: Soft, non-distended, non-tender   Diet: NPO/NGTube  Meds: pantoprazole  Injectable 40 milliGRAM(s) IV Push daily  polyethylene glycol 3350 17 Gram(s) Oral daily  senna 2 Tablet(s) Oral at bedtime      GENITOURINARY  I&O's Detail    08-30 @ 07:01  -  08-31 @ 02:44  --------------------------------------------------------  IN:    IV PiggyBack: 450 mL    Lactated Ringers: 1000 mL  Total IN: 1450 mL    OUT:    Bulb (mL): 40 mL    Bulb (mL): 0 mL    Indwelling Catheter - Urethral (mL): 810 mL    VAC (Vacuum Assisted Closure) System (mL): 0 mL  Total OUT: 850 mL    Total NET: 600 mL      Weight (kg): 114.8 (08-30 @ 06:31)  08-31    147<H>  |  111<H>  |  8   ----------------------------<  154<H>  3.9   |  21<L>  |  0.73    Ca    9.3      31 Aug 2023 00:30  Phos  2.0     08-31  Mg     2.10     08-31      [x] Hoang catheter, indication: N/A  Meds: lactated ringers. 1000 milliLiter(s) IV Continuous <Continuous>  potassium phosphate / sodium phosphate Powder (PHOS-NaK) 1 Packet(s) Oral once  potassium phosphate IVPB 30 milliMole(s) IV Intermittent once  sodium chloride 0.9% lock flush 3 milliLiter(s) IV Push Once      HEMATOLOGIC  Meds: enoxaparin Injectable 40 milliGRAM(s) SubCutaneous every 24 hours    [x] VTE Prophylaxis                        12.1   12.10 )-----------( 225      ( 31 Aug 2023 00:30 )             37.7     PT/INR - ( 30 Aug 2023 19:43 )   PT: 10.6 sec;   INR: 0.94 ratio         PTT - ( 30 Aug 2023 19:43 )  PTT:29.4 sec  Transfusion     [ ] PRBC   [ ] Platelets   [ ] FFP   [ ] Cryoprecipitate      INFECTIOUS DISEASES  T(C): 35.9 (08-31-23 @ 00:00), Max: 37 (08-30-23 @ 06:31)  Wt(kg): --  WBC Count: 12.10 K/uL (08-31 @ 00:30)  WBC Count: 12.58 K/uL (08-30 @ 19:43)    Recent Cultures:    Meds: ampicillin/sulbactam  IVPB 3 Gram(s) IV Intermittent every 6 hours  ampicillin/sulbactam  IVPB          ENDOCRINE  Capillary Blood Glucose    Meds: dexAMETHasone  IVPB 8 milliGRAM(s) IV Intermittent every 8 hours  levothyroxine Injectable 60 MICROGram(s) IV Push at bedtime      ACCESS DEVICES:  [x] Peripheral IV  [ ] Central Venous Line	[ ] R	[ ] L	[ ] IJ	[ ] Fem	[ ] SC	Placed:   [x] Arterial Line		[ ] R	[ ] L	[ ] Fem	[ ] Rad	[ ] Ax	Placed:   [ ] PICC:					[ ] Mediport  [ x] Urinary Catheter, Date Placed:   [ ] Necessity of urinary, arterial, and venous catheters discussed    OTHER MEDICATIONS:  chlorhexidine 0.12% Liquid 15 milliLiter(s) Swish and Spit two times a day      CODE STATUS: full    IMAGING:

## 2023-08-31 NOTE — PROGRESS NOTE ADULT - SUBJECTIVE AND OBJECTIVE BOX
Plastic Surgery Progress Note (pg [facility]: [id number], [facility]: [phone])    SUBJECTIVE  The patient was seen and examined. Resting in SICU bed, no acute concerns.    OBJECTIVE  ___________________________________________________  VITAL SIGNS / I&O's   Vital Signs Last 24 Hrs  T(C): 36.4 (31 Aug 2023 12:00), Max: 37 (31 Aug 2023 04:00)  T(F): 97.6 (31 Aug 2023 12:00), Max: 98.6 (31 Aug 2023 04:00)  HR: 64 (31 Aug 2023 15:00) (54 - 83)  BP: 125/70 (31 Aug 2023 15:00) (122/75 - 145/77)  BP(mean): 87 (31 Aug 2023 15:00) (87 - 116)  RR: 15 (31 Aug 2023 15:00) (10 - 24)  SpO2: 95% (31 Aug 2023 15:00) (94% - 100%)    Parameters below as of 31 Aug 2023 08:00  Patient On (Oxygen Delivery Method): tracheostomy collar  O2 Flow (L/min): 6  O2 Concentration (%): 40      30 Aug 2023 07:01  -  31 Aug 2023 07:00  --------------------------------------------------------  IN:    IV PiggyBack: 1200 mL    Lactated Ringers: 1500 mL  Total IN: 2700 mL    OUT:    Bulb (mL): 50 mL    Bulb (mL): 5 mL    Indwelling Catheter - Urethral (mL): 1235 mL    VAC (Vacuum Assisted Closure) System (mL): 0 mL  Total OUT: 1290 mL    Total NET: 1410 mL      31 Aug 2023 07:01  -  31 Aug 2023 16:41  --------------------------------------------------------  IN:    Pivot 1.5: 100 mL  Total IN: 100 mL    OUT:    Indwelling Catheter - Urethral (mL): 125 mL    Voided (mL): 50 mL  Total OUT: 175 mL    Total NET: -75 mL        ___________________________________________________  PHYSICAL EXAM    CONSTITUTIONAL: no apparent distress  PSYCH: Appropriate insight/judgment; A+O x 3, mood and affect appropriate  HENT: CN II-XII grossly intact, No conjunctival or scleral injection, non-icteric. Flap examined on the right posterior tongue. No congestion or edema noted. Flap soft and well healing. Triphasic cook doppler.  RESP: No respiratory distress, no use of accessory muscles  SKIN: No rashes or ulcers noted    ___________________________________________________  LABS                        12.1   12.10 )-----------( 225      ( 31 Aug 2023 00:30 )             37.7     31 Aug 2023 00:30    147    |  111    |  8      ----------------------------<  154    3.9     |  21     |  0.73     Ca    9.3        31 Aug 2023 00:30  Phos  2.0       31 Aug 2023 00:30  Mg     2.10      31 Aug 2023 00:30      PT/INR - ( 30 Aug 2023 19:43 )   PT: 10.6 sec;   INR: 0.94 ratio         PTT - ( 30 Aug 2023 19:43 )  PTT:29.4 sec  CAPILLARY BLOOD GLUCOSE            Urinalysis Basic - ( 31 Aug 2023 00:30 )    Color: x / Appearance: x / SG: x / pH: x  Gluc: 154 mg/dL / Ketone: x  / Bili: x / Urobili: x   Blood: x / Protein: x / Nitrite: x   Leuk Esterase: x / RBC: x / WBC x   Sq Epi: x / Non Sq Epi: x / Bacteria: x      ___________________________________________________  MICRO  Recent Cultures:    ___________________________________________________  MEDICATIONS  (STANDING):  acetaminophen   Oral Liquid .. 1000 milliGRAM(s) Oral every 6 hours  ampicillin/sulbactam  IVPB 3 Gram(s) IV Intermittent every 6 hours  ampicillin/sulbactam  IVPB      atorvastatin 10 milliGRAM(s) Oral at bedtime  chlorhexidine 0.12% Liquid 15 milliLiter(s) Swish and Spit two times a day  chlorhexidine 2% Cloths 1 Application(s) Topical daily  enoxaparin Injectable 40 milliGRAM(s) SubCutaneous every 24 hours  gabapentin Solution 200 milliGRAM(s) Oral three times a day  levothyroxine Injectable 60 MICROGram(s) IV Push at bedtime  pantoprazole  Injectable 40 milliGRAM(s) IV Push daily  polyethylene glycol 3350 17 Gram(s) Oral daily  senna 2 Tablet(s) Oral at bedtime    MEDICATIONS  (PRN):  ondansetron Injectable 4 milliGRAM(s) IV Push every 6 hours PRN Nausea and/or Vomiting  oxyCODONE    Solution 5 milliGRAM(s) Oral every 4 hours PRN Moderate Pain (4 - 6)  oxyCODONE    Solution 10 milliGRAM(s) Oral every 4 hours PRN Severe Pain (7 - 10)          Assessment & Plan      [facility]  Plastic & Reconstructive Surgery, PGY-1  #[id number]

## 2023-08-31 NOTE — PHYSICAL THERAPY INITIAL EVALUATION ADULT - ACTIVE RANGE OF MOTION EXAMINATION, REHAB EVAL
except left Upper Extremity limited range of motion due to +wound vac/bilateral upper extremity Active ROM was WFL (within functional limits)/bilateral  lower extremity Active ROM was WFL (within functional limits)

## 2023-08-31 NOTE — PROGRESS NOTE ADULT - ASSESSMENT
61 y/o female with PMHx HLD, hypothyroidism, obesity, found to have malignant neoplasm of tongue. Patient is now s/p R hemiglossectomy, R neck dissection with radial freeflap and tracheostomy on 8/30. SICU consulted for q1hr neurochecks and trach management.     PLAN:   Neurologic:   - pain control prn- tylenol, oxy, gabapentin  - A&OX4, no issues     Respiratory: trach   - trach   - maintain SPO2 > 92%    Cardiovascular:   - MAP > 65  - no pressor requirements   - continue home statin     Gastrointestinal/Nutrition:   - Diet: NPO/NGTube  - protonix IV daily   - bowel regimen  - f/u CXR for KO placement   - zofran prn     Renal/Genitourinary:   - LR@100  - indwelling loza   - monitor I&Os  - monitor electrolytes, replete prn    Hematologic:   - monitor H/H on daily CBC  - DVT ppx: Lovenox     Infectious Disease:   - unasyn post-op  - monitor WBC and trend fever curve     Endocrine:   - decadron 8q8hr x3 doses post-op  - continue home levothyroxine   - monitor glucose on BMP (goal 140)    Disposition: SICU         61 y/o female with PMHx HLD, hypothyroidism, obesity, found to have malignant neoplasm of tongue. Patient is now s/p R hemiglossectomy, R neck dissection with radial freeflap and tracheostomy on 8/30. SICU consulted for q1hr neurochecks and trach management.     PLAN:   Neurologic:   - pain control prn- IV Tylenol, oxy, gabapentin  - A&OX4, no issues  - q2 flap checks. Start q4 flap checks 9/1    Respiratory: trach   - trach and trach collar  - maintain SPO2 > 92%    Cardiovascular:   - MAP > 65  - no pressor requirements   - continue home statin     Gastrointestinal/Nutrition:   - Diet: NPO/NGTube. Start tube feeds today.  - protonix IV daily   - bowel regimen  - CXR (8/31): Enteric tube terminates in the stomach  - zofran prn     Renal/Genitourinary:   - Hoang d/c'ed (8/31). Due to void 8/31 @6PM  - LR@100  - monitor I&Os  - monitor electrolytes, replete prn    Hematologic:   - monitor H/H on daily CBC  - DVT ppx: Lovenox     Infectious Disease:   - unasyn post op (8/30-)  - monitor WBC and trend fever curve     Endocrine:   - continue home levothyroxine   - monitor glucose on BMP (goal 140)    Disposition: SICU

## 2023-09-01 LAB
ANION GAP SERPL CALC-SCNC: 14 MMOL/L — SIGNIFICANT CHANGE UP (ref 7–14)
BUN SERPL-MCNC: 15 MG/DL — SIGNIFICANT CHANGE UP (ref 7–23)
CALCIUM SERPL-MCNC: 9.1 MG/DL — SIGNIFICANT CHANGE UP (ref 8.4–10.5)
CHLORIDE SERPL-SCNC: 113 MMOL/L — HIGH (ref 98–107)
CO2 SERPL-SCNC: 23 MMOL/L — SIGNIFICANT CHANGE UP (ref 22–31)
CREAT SERPL-MCNC: 0.86 MG/DL — SIGNIFICANT CHANGE UP (ref 0.5–1.3)
EGFR: 76 ML/MIN/1.73M2 — SIGNIFICANT CHANGE UP
GLUCOSE SERPL-MCNC: 114 MG/DL — HIGH (ref 70–99)
HCT VFR BLD CALC: 36.5 % — SIGNIFICANT CHANGE UP (ref 34.5–45)
HGB BLD-MCNC: 11.4 G/DL — LOW (ref 11.5–15.5)
MAGNESIUM SERPL-MCNC: 2.4 MG/DL — SIGNIFICANT CHANGE UP (ref 1.6–2.6)
MCHC RBC-ENTMCNC: 31.1 PG — SIGNIFICANT CHANGE UP (ref 27–34)
MCHC RBC-ENTMCNC: 31.2 GM/DL — LOW (ref 32–36)
MCV RBC AUTO: 99.5 FL — SIGNIFICANT CHANGE UP (ref 80–100)
NRBC # BLD: 0 /100 WBCS — SIGNIFICANT CHANGE UP (ref 0–0)
NRBC # FLD: 0 K/UL — SIGNIFICANT CHANGE UP (ref 0–0)
PHOSPHATE SERPL-MCNC: 2.5 MG/DL — SIGNIFICANT CHANGE UP (ref 2.5–4.5)
PLATELET # BLD AUTO: 224 K/UL — SIGNIFICANT CHANGE UP (ref 150–400)
POTASSIUM SERPL-MCNC: 3.6 MMOL/L — SIGNIFICANT CHANGE UP (ref 3.5–5.3)
POTASSIUM SERPL-SCNC: 3.6 MMOL/L — SIGNIFICANT CHANGE UP (ref 3.5–5.3)
RBC # BLD: 3.67 M/UL — LOW (ref 3.8–5.2)
RBC # FLD: 14.4 % — SIGNIFICANT CHANGE UP (ref 10.3–14.5)
SODIUM SERPL-SCNC: 150 MMOL/L — HIGH (ref 135–145)
WBC # BLD: 11.39 K/UL — HIGH (ref 3.8–10.5)
WBC # FLD AUTO: 11.39 K/UL — HIGH (ref 3.8–10.5)

## 2023-09-01 PROCEDURE — 99233 SBSQ HOSP IP/OBS HIGH 50: CPT | Mod: GC

## 2023-09-01 RX ORDER — HYDRALAZINE HCL 50 MG
10 TABLET ORAL EVERY 6 HOURS
Refills: 0 | Status: DISCONTINUED | OUTPATIENT
Start: 2023-09-01 | End: 2023-09-03

## 2023-09-01 RX ORDER — POTASSIUM PHOSPHATE, MONOBASIC POTASSIUM PHOSPHATE, DIBASIC 236; 224 MG/ML; MG/ML
30 INJECTION, SOLUTION INTRAVENOUS ONCE
Refills: 0 | Status: COMPLETED | OUTPATIENT
Start: 2023-09-01 | End: 2023-09-01

## 2023-09-01 RX ADMIN — OXYCODONE HYDROCHLORIDE 5 MILLIGRAM(S): 5 TABLET ORAL at 22:13

## 2023-09-01 RX ADMIN — Medication 1000 MILLIGRAM(S): at 06:23

## 2023-09-01 RX ADMIN — Medication 1000 MILLIGRAM(S): at 00:30

## 2023-09-01 RX ADMIN — GABAPENTIN 200 MILLIGRAM(S): 400 CAPSULE ORAL at 13:16

## 2023-09-01 RX ADMIN — GABAPENTIN 200 MILLIGRAM(S): 400 CAPSULE ORAL at 22:13

## 2023-09-01 RX ADMIN — Medication 1000 MILLIGRAM(S): at 18:00

## 2023-09-01 RX ADMIN — POTASSIUM PHOSPHATE, MONOBASIC POTASSIUM PHOSPHATE, DIBASIC 83.33 MILLIMOLE(S): 236; 224 INJECTION, SOLUTION INTRAVENOUS at 06:58

## 2023-09-01 RX ADMIN — GABAPENTIN 200 MILLIGRAM(S): 400 CAPSULE ORAL at 06:08

## 2023-09-01 RX ADMIN — Medication 1000 MILLIGRAM(S): at 11:50

## 2023-09-01 RX ADMIN — Medication 60 MICROGRAM(S): at 23:32

## 2023-09-01 RX ADMIN — PANTOPRAZOLE SODIUM 40 MILLIGRAM(S): 20 TABLET, DELAYED RELEASE ORAL at 11:50

## 2023-09-01 RX ADMIN — Medication 1000 MILLIGRAM(S): at 01:00

## 2023-09-01 RX ADMIN — CHLORHEXIDINE GLUCONATE 1 APPLICATION(S): 213 SOLUTION TOPICAL at 11:51

## 2023-09-01 RX ADMIN — Medication 1000 MILLIGRAM(S): at 06:48

## 2023-09-01 RX ADMIN — ATORVASTATIN CALCIUM 10 MILLIGRAM(S): 80 TABLET, FILM COATED ORAL at 22:15

## 2023-09-01 RX ADMIN — OXYCODONE HYDROCHLORIDE 5 MILLIGRAM(S): 5 TABLET ORAL at 23:00

## 2023-09-01 RX ADMIN — Medication 1000 MILLIGRAM(S): at 12:40

## 2023-09-01 RX ADMIN — ENOXAPARIN SODIUM 40 MILLIGRAM(S): 100 INJECTION SUBCUTANEOUS at 17:23

## 2023-09-01 RX ADMIN — CHLORHEXIDINE GLUCONATE 15 MILLILITER(S): 213 SOLUTION TOPICAL at 06:08

## 2023-09-01 RX ADMIN — CHLORHEXIDINE GLUCONATE 15 MILLILITER(S): 213 SOLUTION TOPICAL at 17:23

## 2023-09-01 RX ADMIN — Medication 1000 MILLIGRAM(S): at 17:23

## 2023-09-01 NOTE — DIETITIAN INITIAL EVALUATION ADULT - NS FNS DIET ORDER
Diet, NPO with Tube Feed:   Tube Feeding Modality: Nasogastric  Pivot 1.5 [place] (PIVOT1.5RTH)  Total Volume for 24 Hours (mL): 960  Continuous  Starting Tube Feed Rate {mL per Hour}: 25  Increase Tube Feed Rate by (mL): 15     Every 6 hours  Until Goal Tube Feed Rate (mL per Hour): 40  Tube Feed Duration (in Hours): 24  Tube Feed Start Time: 08:00 (08-31-23 @ 06:52)

## 2023-09-01 NOTE — CHART NOTE - NSCHARTNOTEFT_GEN_A_CORE
Patient's right neck contreras-adames drain removed, no complications, patient tolerated well.  Contreras-adames drain stoma covered with guaze pad.

## 2023-09-01 NOTE — DIETITIAN INITIAL EVALUATION ADULT - PERTINENT LABORATORY DATA
09-01    150<H>  |  113<H>  |  15  ----------------------------<  114<H>  3.6   |  23  |  0.86    Ca    9.1      01 Sep 2023 02:25  Phos  2.5     09-01  Mg     2.40     09-01    A1C with Estimated Average Glucose Result: 5.2 % (08-30-23 @ 19:43)

## 2023-09-01 NOTE — PROGRESS NOTE ADULT - SUBJECTIVE AND OBJECTIVE BOX
24 HOUR EVENTS:  - TFs started  - loza d/delfin (8/31), due to void 8/31 at 6PM  - q2hr flap checks.   - s/p unasyn 24h    SUBJECTIVE/ROS:  no new concerns    NEURO  Exam: AOx4, NAD, no focal deficits  Meds: acetaminophen   Oral Liquid .. 1000 milliGRAM(s) Oral every 6 hours  gabapentin Solution 200 milliGRAM(s) Oral three times a day  ondansetron Injectable 4 milliGRAM(s) IV Push every 6 hours PRN Nausea and/or Vomiting  oxyCODONE    Solution 5 milliGRAM(s) Oral every 4 hours PRN Moderate Pain (4 - 6)  oxyCODONE    Solution 10 milliGRAM(s) Oral every 4 hours PRN Severe Pain (7 - 10)        RESPIRATORY  RR: 16 (09-01-23 @ 00:00) (14 - 25)  SpO2: 96% (09-01-23 @ 00:00) (94% - 100%)  Wt(kg): --  Exam: unlabored respirations, CTA b/l. No murmurs, rubs, gallops appreciated.   Mechanical Ventilation:   ABG - ( 31 Aug 2023 00:30 )  pH: x     /  pCO2: x     /  pO2: x     / HCO3: x     / Base Excess: x     /  SaO2: x       Lactate: 1.9  Meds:       CARDIOVASCULAR  HR: 67 (09-01-23 @ 00:00) (54 - 83)  BP: 119/71 (09-01-23 @ 00:00) (118/76 - 145/77)  BP(mean): 86 (09-01-23 @ 00:00) (86 - 116)  ABP: 131/84 (08-31-23 @ 10:00) (131/84 - 158/80)  ABP(mean): 104 (08-31-23 @ 10:00) (96 - 110)  Wt(kg): --  CVP(cm H2O): --  Exam: S1S2. No murmurs, rubs, gallops appreciated.  Cardiac Rhythm: NSR  Meds:       GI/NUTRITION  Exam: Soft, non-distended, non-tender.   Diet: NPO/NGTube  Meds: pantoprazole  Injectable 40 milliGRAM(s) IV Push daily  polyethylene glycol 3350 17 Gram(s) Oral daily  senna 2 Tablet(s) Oral at bedtime      GENITOURINARY  I&O's Detail    08-30 @ 07:01  -  08-31 @ 07:00  --------------------------------------------------------  IN:    IV PiggyBack: 1200 mL    Lactated Ringers: 1500 mL  Total IN: 2700 mL    OUT:    Bulb (mL): 50 mL    Bulb (mL): 5 mL    Indwelling Catheter - Urethral (mL): 1235 mL    VAC (Vacuum Assisted Closure) System (mL): 0 mL  Total OUT: 1290 mL    Total NET: 1410 mL      08-31 @ 07:01  -  09-01 @ 02:17  --------------------------------------------------------  IN:    IV PiggyBack: 100 mL    Pivot 1.5: 380 mL  Total IN: 480 mL    OUT:    Bulb (mL): 30 mL    Bulb (mL): 20 mL    Indwelling Catheter - Urethral (mL): 125 mL    VAC (Vacuum Assisted Closure) System (mL): 0 mL    Voided (mL): 400 mL  Total OUT: 575 mL    Total NET: -95 mL      08-31    147<H>  |  111<H>  |  8   ----------------------------<  154<H>  3.9   |  21<L>  |  0.73    Ca    9.3      31 Aug 2023 00:30  Phos  2.0     08-31  Mg     2.10     08-31      [ ] Loza catheter, indication: N/A  Meds:       HEMATOLOGIC  Meds: enoxaparin Injectable 40 milliGRAM(s) SubCutaneous every 24 hours  [x] VTE Prophylaxis                        12.1   12.10 )-----------( 225      ( 31 Aug 2023 00:30 )             37.7     PT/INR - ( 30 Aug 2023 19:43 )   PT: 10.6 sec;   INR: 0.94 ratio         PTT - ( 30 Aug 2023 19:43 )  PTT:29.4 sec  Transfusion     [ ] PRBC   [ ] Platelets   [ ] FFP   [ ] Cryoprecipitate      INFECTIOUS DISEASES  T(C): 36.1 (09-01-23 @ 00:00), Max: 37 (08-31-23 @ 04:00)  Wt(kg): --    Recent Cultures:    Meds:       ENDOCRINE  Capillary Blood Glucose    Meds: atorvastatin 10 milliGRAM(s) Oral at bedtime  levothyroxine Injectable 60 MICROGram(s) IV Push at bedtime    OTHER MEDICATIONS:  chlorhexidine 0.12% Liquid 15 milliLiter(s) Swish and Spit two times a day  chlorhexidine 2% Cloths 1 Application(s) Topical daily      CODE STATUS:     IMAGING: 24 HOUR EVENTS:  - TFs started  - loza d/delfin (8/31), due to void 8/31 at 6PM  - q2hr flap checks.   - s/p unasyn 24h    SUBJECTIVE/ROS:  no new concerns    NEURO  Exam: AOx4, NAD, no focal deficits  Meds: acetaminophen   Oral Liquid .. 1000 milliGRAM(s) Oral every 6 hours  gabapentin Solution 200 milliGRAM(s) Oral three times a day  ondansetron Injectable 4 milliGRAM(s) IV Push every 6 hours PRN Nausea and/or Vomiting  oxyCODONE    Solution 5 milliGRAM(s) Oral every 4 hours PRN Moderate Pain (4 - 6)  oxyCODONE    Solution 10 milliGRAM(s) Oral every 4 hours PRN Severe Pain (7 - 10)        RESPIRATORY  RR: 16 (09-01-23 @ 00:00) (14 - 25)  SpO2: 96% (09-01-23 @ 00:00) (94% - 100%)  Wt(kg): --  Exam: unlabored respirations, CTA b/l. No murmurs, rubs, gallops appreciated.   Mechanical Ventilation:   ABG - ( 31 Aug 2023 00:30 )  pH: x     /  pCO2: x     /  pO2: x     / HCO3: x     / Base Excess: x     /  SaO2: x       Lactate: 1.9  Meds:       CARDIOVASCULAR  HR: 67 (09-01-23 @ 00:00) (54 - 83)  BP: 119/71 (09-01-23 @ 00:00) (118/76 - 145/77)  BP(mean): 86 (09-01-23 @ 00:00) (86 - 116)  ABP: 131/84 (08-31-23 @ 10:00) (131/84 - 158/80)  ABP(mean): 104 (08-31-23 @ 10:00) (96 - 110)  Wt(kg): --  CVP(cm H2O): --  Exam: S1S2. No murmurs, rubs, gallops appreciated.  Cardiac Rhythm: NSR  Meds:       GI/NUTRITION  Exam: Soft, non-distended, non-tender.   Diet: on TF  Meds: pantoprazole  Injectable 40 milliGRAM(s) IV Push daily  polyethylene glycol 3350 17 Gram(s) Oral daily  senna 2 Tablet(s) Oral at bedtime      GENITOURINARY  I&O's Detail    08-30 @ 07:01  -  08-31 @ 07:00  --------------------------------------------------------  IN:    IV PiggyBack: 1200 mL    Lactated Ringers: 1500 mL  Total IN: 2700 mL    OUT:    Bulb (mL): 50 mL    Bulb (mL): 5 mL    Indwelling Catheter - Urethral (mL): 1235 mL    VAC (Vacuum Assisted Closure) System (mL): 0 mL  Total OUT: 1290 mL    Total NET: 1410 mL      08-31 @ 07:01  -  09-01 @ 02:17  --------------------------------------------------------  IN:    IV PiggyBack: 100 mL    Pivot 1.5: 380 mL  Total IN: 480 mL    OUT:    Bulb (mL): 30 mL    Bulb (mL): 20 mL    Indwelling Catheter - Urethral (mL): 125 mL    VAC (Vacuum Assisted Closure) System (mL): 0 mL    Voided (mL): 400 mL  Total OUT: 575 mL    Total NET: -95 mL      08-31    147<H>  |  111<H>  |  8   ----------------------------<  154<H>  3.9   |  21<L>  |  0.73    Ca    9.3      31 Aug 2023 00:30  Phos  2.0     08-31  Mg     2.10     08-31      [ ] Loza catheter, indication: N/A  Meds:       HEMATOLOGIC  Meds: enoxaparin Injectable 40 milliGRAM(s) SubCutaneous every 24 hours  [x] VTE Prophylaxis                        12.1   12.10 )-----------( 225      ( 31 Aug 2023 00:30 )             37.7     PT/INR - ( 30 Aug 2023 19:43 )   PT: 10.6 sec;   INR: 0.94 ratio         PTT - ( 30 Aug 2023 19:43 )  PTT:29.4 sec  Transfusion     [ ] PRBC   [ ] Platelets   [ ] FFP   [ ] Cryoprecipitate      INFECTIOUS DISEASES  T(C): 36.1 (09-01-23 @ 00:00), Max: 37 (08-31-23 @ 04:00)  Wt(kg): --    Recent Cultures:    Meds:       ENDOCRINE  Capillary Blood Glucose    Meds: atorvastatin 10 milliGRAM(s) Oral at bedtime  levothyroxine Injectable 60 MICROGram(s) IV Push at bedtime    OTHER MEDICATIONS:  chlorhexidine 0.12% Liquid 15 milliLiter(s) Swish and Spit two times a day  chlorhexidine 2% Cloths 1 Application(s) Topical daily      CODE STATUS:     IMAGING: 24 HOUR EVENTS:  - loza d/delfin (8/31), passed TOV. Adequate UOP.  - q2hr flap checks.   - s/p Unasyn 24h (8/30-8/31)    SUBJECTIVE/ROS:  Patient is comfortable and has no complaints. Pain is well controlled. Denies fevers, chills, nausea, chest pain, palpitations, SOB.     NEURO  Exam: AOx4, NAD, no focal deficits.  Meds: acetaminophen   Oral Liquid .. 1000 milliGRAM(s) Oral every 6 hours  gabapentin Solution 200 milliGRAM(s) Oral three times a day  ondansetron Injectable 4 milliGRAM(s) IV Push every 6 hours PRN Nausea and/or Vomiting  oxyCODONE    Solution 5 milliGRAM(s) Oral every 4 hours PRN Moderate Pain (4 - 6)  oxyCODONE    Solution 10 milliGRAM(s) Oral every 4 hours PRN Severe Pain (7 - 10)        RESPIRATORY  RR: 16 (09-01-23 @ 00:00) (14 - 25)  SpO2: 96% (09-01-23 @ 00:00) (94% - 100%)  Wt(kg): --  Exam: Trach in place, secured with sutures. Unlabored respirations, CTA b/l.  Mechanical Ventilation:   ABG - ( 31 Aug 2023 00:30 )  pH: x     /  pCO2: x     /  pO2: x     / HCO3: x     / Base Excess: x     /  SaO2: x       Lactate: 1.9  Meds:       CARDIOVASCULAR  HR: 67 (09-01-23 @ 00:00) (54 - 83)  BP: 119/71 (09-01-23 @ 00:00) (118/76 - 145/77)  BP(mean): 86 (09-01-23 @ 00:00) (86 - 116)  ABP: 131/84 (08-31-23 @ 10:00) (131/84 - 158/80)  ABP(mean): 104 (08-31-23 @ 10:00) (96 - 110)  Wt(kg): --  CVP(cm H2O): --  Exam: S1S2. No murmurs, rubs, gallops appreciated.   Cardiac Rhythm: NSR  Meds:       GI/NUTRITION  Exam: Soft, non-distended, non-tender.   Diet: on TF  Meds: pantoprazole  Injectable 40 milliGRAM(s) IV Push daily  polyethylene glycol 3350 17 Gram(s) Oral daily  senna 2 Tablet(s) Oral at bedtime      GENITOURINARY  I&O's Detail    08-30 @ 07:01  -  08-31 @ 07:00  --------------------------------------------------------  IN:    IV PiggyBack: 1200 mL    Lactated Ringers: 1500 mL  Total IN: 2700 mL    OUT:    Bulb (mL): 50 mL    Bulb (mL): 5 mL    Indwelling Catheter - Urethral (mL): 1235 mL    VAC (Vacuum Assisted Closure) System (mL): 0 mL  Total OUT: 1290 mL    Total NET: 1410 mL      08-31 @ 07:01  -  09-01 @ 02:17  --------------------------------------------------------  IN:    IV PiggyBack: 100 mL    Pivot 1.5: 380 mL  Total IN: 480 mL    OUT:    Bulb (mL): 30 mL    Bulb (mL): 20 mL    Indwelling Catheter - Urethral (mL): 125 mL    VAC (Vacuum Assisted Closure) System (mL): 0 mL    Voided (mL): 400 mL  Total OUT: 575 mL    Total NET: -95 mL    09-01    150<H>  |  113<H>  |  15  ----------------------------<  114<H>  3.6   |  23  |  0.86    Ca    9.1      01 Sep 2023 02:25  Phos  2.5     09-01  Mg     2.40     09-01 08-31    147<H>  |  111<H>  |  8   ----------------------------<  154<H>  3.9   |  21<L>  |  0.73    Ca    9.3      31 Aug 2023 00:30  Phos  2.0     08-31  Mg     2.10     08-31      [ ] Loza catheter, indication: N/A  Meds:       HEMATOLOGIC  Meds: enoxaparin Injectable 40 milliGRAM(s) SubCutaneous every 24 hours  [x] VTE Prophylaxis                 11.4   11.39 )-----------( 224      ( 01 Sep 2023 02:25 )             36.5                         12.1   12.10 )-----------( 225      ( 31 Aug 2023 00:30 )             37.7     PT/INR - ( 30 Aug 2023 19:43 )   PT: 10.6 sec;   INR: 0.94 ratio         PTT - ( 30 Aug 2023 19:43 )  PTT:29.4 sec  Transfusion     [ ] PRBC   [ ] Platelets   [ ] FFP   [ ] Cryoprecipitate      INFECTIOUS DISEASES  T(C): 36.1 (09-01-23 @ 00:00), Max: 37 (08-31-23 @ 04:00)  Wt(kg): --    Recent Cultures:    Meds:       ENDOCRINE  Capillary Blood Glucose    Meds: atorvastatin 10 milliGRAM(s) Oral at bedtime  levothyroxine Injectable 60 MICROGram(s) IV Push at bedtime    OTHER MEDICATIONS:  chlorhexidine 0.12% Liquid 15 milliLiter(s) Swish and Spit two times a day  chlorhexidine 2% Cloths 1 Application(s) Topical daily      CODE STATUS:     IMAGING:

## 2023-09-01 NOTE — PROGRESS NOTE ADULT - ASSESSMENT
61 y/o female with PMHx HLD, hypothyroidism, obesity, found to have malignant neoplasm of tongue. Patient is now s/p R hemiglossectomy, R neck dissection with radial freeflap and tracheostomy on 8/30. SICU consulted fo flap checks.       PLAN:   Neurologic:   - pain control prn- PO tylenol, oxy, gabapentin  - A&OX4, no issues   - q2hr flap checks. Start q4 flap checks 9/1    Respiratory: trach   - trach and trach collar  - maintain SPO2 > 92%    Cardiovascular:   - MAP > 65  - continue home statin     Gastrointestinal/Nutrition:   - CXR (8/31): Enteric tube terminates in the stomach  - Diet: NPO with TFs  - protonix IV daily   - bowel regimen  - zofran prn     Renal/Genitourinary:   - dago d/c'ed (8/31), due to void 8/31 at 6PM  - LR@100  - monitor I&Os  - monitor electrolytes, replete prn    Hematologic:   - monitor H/H on daily CBC  - DVT ppx: Lovenox     Infectious Disease:   - s/p unasyn x24h   - monitor WBC and trend fever curve     Endocrine:   - continue home levothyroxine   - monitor glucose on BMP (goal 140)    Disposition: SICU   63 y/o female with PMHx HLD, hypothyroidism, obesity, found to have malignant neoplasm of tongue. Patient is now s/p R hemiglossectomy, R neck dissection with radial freeflap and tracheostomy on 8/30. SICU consulted fo flap checks.     PLAN:   Neurologic:   - pain control prn- PO tylenol, oxy, gabapentin  - A&OX4, no issues   - q2hr flap checks. Start q4 flap checks 9/1    Respiratory: trach   - trach and trach collar  - maintain SPO2 > 92%    Cardiovascular:   - MAP > 65  - continue home statin  - hydral 10q6hr prn SBP >140    Gastrointestinal/Nutrition:   - CXR (8/31): Enteric tube terminates in the stomach  - Diet: NPO with TFs  - protonix IV daily   - bowel regimen  - zofran prn     Renal/Genitourinary:   - dago d/c'ed (8/31), passed TOV. Adequate UOP.  - monitor I&Os  - monitor electrolytes, replete prn  - free water flushes 250cc q6hr    Hematologic:   - monitor H/H on daily CBC  - DVT ppx: Lovenox     Infectious Disease:   - s/p unasyn x24h   - monitor WBC and trend fever curve     Endocrine:   - continue home levothyroxine   - monitor glucose on BMP (goal 140)    Disposition: SICU

## 2023-09-01 NOTE — DIETITIAN INITIAL EVALUATION ADULT - NSFNSGIIOFT_GEN_A_CORE
08-31-23 @ 07:01  -  09-01-23 @ 07:00  --------------------------------------------------------  OUT:  Total OUT: 0 mL    Total NET: 660 mL

## 2023-09-01 NOTE — DIETITIAN INITIAL EVALUATION ADULT - OTHER INFO
62yFemale with s/p right tongue resection, SND I-IV neck dissection, tracheostomy placement w/ [id number] and radial forearm free flap 8/30.    Pt sleeping at time of visit, spoke with RN. Per Nursing Pt requested to rest and not be disturbed at this time. Pt with no overt signs of muscle wasting/fat loss upon visualization. Currently receiving EN via NGT, Pivot 1.5 at the goal rate. Day RN reported Pt had one episode of diarrhea per overnight Nurse.   HIE weight trend reviewed and no weight loss noted.  62yFemale with s/p right tongue resection, SND I-IV neck dissection, tracheostomy placement w/ [id number] and radial forearm free flap 8/30.    Pt denies any nausea/vomiting. Informed of EN regimen. Noted with moderate bi-temporal muscle wasting. Denies any weight changes prior to admission. Currently receiving EN via NGT, Pivot 1.5 at the goal rate. Day RN reported Pt had one episode of diarrhea per overnight Nurse.

## 2023-09-01 NOTE — DIETITIAN INITIAL EVALUATION ADULT - ENTERAL
Increase EN   ------->   Pivot 1.5 to 55 mL/hr as tolerated. To provide 1,320 mL daily  = 1,980 kcal/ 123 gm pro.

## 2023-09-01 NOTE — DIETITIAN INITIAL EVALUATION ADULT - PERTINENT MEDS FT
MEDICATIONS  (STANDING):  acetaminophen   Oral Liquid .. 1000 milliGRAM(s) Oral every 6 hours  atorvastatin 10 milliGRAM(s) Oral at bedtime  chlorhexidine 0.12% Liquid 15 milliLiter(s) Swish and Spit two times a day  chlorhexidine 2% Cloths 1 Application(s) Topical daily  enoxaparin Injectable 40 milliGRAM(s) SubCutaneous every 24 hours  gabapentin Solution 200 milliGRAM(s) Oral three times a day  levothyroxine Injectable 60 MICROGram(s) IV Push at bedtime  pantoprazole  Injectable 40 milliGRAM(s) IV Push daily  polyethylene glycol 3350 17 Gram(s) Oral daily  senna 2 Tablet(s) Oral at bedtime    MEDICATIONS  (PRN):  ondansetron Injectable 4 milliGRAM(s) IV Push every 6 hours PRN Nausea and/or Vomiting  oxyCODONE    Solution 5 milliGRAM(s) Oral every 4 hours PRN Moderate Pain (4 - 6)  oxyCODONE    Solution 10 milliGRAM(s) Oral every 4 hours PRN Severe Pain (7 - 10)

## 2023-09-01 NOTE — PROGRESS NOTE ADULT - SUBJECTIVE AND OBJECTIVE BOX
ENT Progress Note  Interval:  Patient seen and examined at bedside. RODRÍGUEZ. Pain well controlled. doppler signal strong      PAST MEDICAL & SURGICAL HISTORY:  Hypothyroidism      Hypercholesteremia      Osteoarthritis      H/O diverticulitis of colon      Morbid obesity      Malignant neoplasm of tongue, unspecified      Dyslipidemia      History of right hip replacement  10/2013      H/O squamous cell carcinoma excision  5/2008 left arm      H/O colonoscopy  7/2019      H/O basal cell carcinoma excision        Allergies    No Known Allergies    Intolerances      MEDICATIONS  (STANDING):  acetaminophen   IVPB .. 1000 milliGRAM(s) IV Intermittent every 6 hours  ampicillin/sulbactam  IVPB 3 Gram(s) IV Intermittent every 6 hours  ampicillin/sulbactam  IVPB      chlorhexidine 0.12% Liquid 15 milliLiter(s) Swish and Spit two times a day  dexAMETHasone  IVPB 8 milliGRAM(s) IV Intermittent every 8 hours  enoxaparin Injectable 40 milliGRAM(s) SubCutaneous every 24 hours  gabapentin Solution 200 milliGRAM(s) Oral three times a day  lactated ringers. 1000 milliLiter(s) (100 mL/Hr) IV Continuous <Continuous>  levothyroxine Injectable 60 MICROGram(s) IV Push at bedtime  pantoprazole  Injectable 40 milliGRAM(s) IV Push daily  polyethylene glycol 3350 17 Gram(s) Oral daily  senna 2 Tablet(s) Oral at bedtime  sodium chloride 0.9% lock flush 3 milliLiter(s) IV Push Once    MEDICATIONS  (PRN):  ondansetron Injectable 4 milliGRAM(s) IV Push every 6 hours PRN Nausea and/or Vomiting  oxyCODONE    Solution 2.5 milliGRAM(s) Oral every 4 hours PRN Moderate Pain (4 - 6)  oxyCODONE    Solution 5 milliGRAM(s) Oral every 4 hours PRN Severe Pain (7 - 10)    ICU Vital Signs Last 24 Hrs  T(C): 36.4 (01 Sep 2023 04:00), Max: 36.7 (31 Aug 2023 08:00)  T(F): 97.5 (01 Sep 2023 04:00), Max: 98 (31 Aug 2023 08:00)  HR: 73 (01 Sep 2023 06:00) (54 - 83)  BP: 141/78 (01 Sep 2023 06:00) (118/76 - 145/77)  BP(mean): 96 (01 Sep 2023 06:00) (86 - 116)  ABP: 131/84 (31 Aug 2023 10:00) (131/84 - 155/81)  ABP(mean): 104 (31 Aug 2023 10:00) (96 - 110)  RR: 18 (01 Sep 2023 06:00) (14 - 25)  SpO2: 98% (01 Sep 2023 06:00) (94% - 100%)    O2 Parameters below as of 01 Sep 2023 06:00  Patient On (Oxygen Delivery Method): tracheostomy collar  O2 Flow (L/min): 6  O2 Concentration (%): 28        Physical Exam:  NAD, resting comfortably in bed   NGT sutured into R nares  6CN75R trach in place, secured with sutures  Blood tinged secretions, no active bleeding  Soft suction passes easily, minimal mucoid secretions  Neck soft, flat, no hematoma or crepitus noted  On trach collar  Neck flat and supple, no collection. Incision closed with staples c/d/i, JPx1 w/ SS output, holding suction  Cook doppler with strong arterial signal. Intraoral skin paddle pink, well perfused. Incisions intact.  OC/OP: no erythema, bleeding, laceration  R forearm in ACE bandage, wound vac in place                          11.4   11.39 )-----------( 224      ( 01 Sep 2023 02:25 )             36.5   09-01    150<H>  |  113<H>  |  15  ----------------------------<  114<H>  3.6   |  23  |  0.86    Ca    9.1      01 Sep 2023 02:25  Phos  2.5     09-01  Mg     2.40     09-01        A/P: 62yFemale with s/p right tongue resection, SND I-IV neck dissection, tracheostomy placement w/ 6CN75R and radial forearm free flap 8/30. CXR confirmed NGT in place.    PLAN:  - ERAS protocol  - Pain meds (Standing tylenol q6 hrs, gabapentin 600mg qd, opioids as needed)  - Agitation meds as needed to avoid excessive neck movement  - NOTHING around neck (no trach ties or collars), do not cut trach sutures  - Flap checks per PRS  - Baci 2x/d to neck incisions  - Lovenox starting POD1  - TF to goal  - OOB/ambulate  - PT/OT  - On humidified trach collar, saturating well  - Suction PRN, routine trach management  - Unasyn x24h  - Peridex swish and spit BID  - Esomeprazole 20 mg BID  - Monitor BP, maintain MAPs ; avoid pressors   - Bowel regimen (senna + miralax)

## 2023-09-01 NOTE — PROGRESS NOTE ADULT - SUBJECTIVE AND OBJECTIVE BOX
Plastic Surgery Progress Note (pg [facility]: [id number], [facility]: [phone])    SUBJECTIVE      OBJECTIVE  ___________________________________________________  VITAL SIGNS / I&O's   Vital Signs Last 24 Hrs  T(C): 36.4 (31 Aug 2023 12:00), Max: 37 (31 Aug 2023 04:00)  T(F): 97.6 (31 Aug 2023 12:00), Max: 98.6 (31 Aug 2023 04:00)  HR: 64 (31 Aug 2023 15:00) (54 - 83)  BP: 125/70 (31 Aug 2023 15:00) (122/75 - 145/77)  BP(mean): 87 (31 Aug 2023 15:00) (87 - 116)  RR: 15 (31 Aug 2023 15:00) (10 - 24)  SpO2: 95% (31 Aug 2023 15:00) (94% - 100%)    Parameters below as of 31 Aug 2023 08:00  Patient On (Oxygen Delivery Method): tracheostomy collar  O2 Flow (L/min): 6  O2 Concentration (%): 40      30 Aug 2023 07:01  -  31 Aug 2023 07:00  --------------------------------------------------------  IN:    IV PiggyBack: 1200 mL    Lactated Ringers: 1500 mL  Total IN: 2700 mL    OUT:    Bulb (mL): 50 mL    Bulb (mL): 5 mL    Indwelling Catheter - Urethral (mL): 1235 mL    VAC (Vacuum Assisted Closure) System (mL): 0 mL  Total OUT: 1290 mL    Total NET: 1410 mL      31 Aug 2023 07:01  -  31 Aug 2023 16:41  --------------------------------------------------------  IN:    Pivot 1.5: 100 mL  Total IN: 100 mL    OUT:    Indwelling Catheter - Urethral (mL): 125 mL    Voided (mL): 50 mL  Total OUT: 175 mL    Total NET: -75 mL        ___________________________________________________  PHYSICAL EXAM        ___________________________________________________  LABS                        12.1   12.10 )-----------( 225      ( 31 Aug 2023 00:30 )             37.7     31 Aug 2023 00:30    147    |  111    |  8      ----------------------------<  154    3.9     |  21     |  0.73     Ca    9.3        31 Aug 2023 00:30  Phos  2.0       31 Aug 2023 00:30  Mg     2.10      31 Aug 2023 00:30      PT/INR - ( 30 Aug 2023 19:43 )   PT: 10.6 sec;   INR: 0.94 ratio         PTT - ( 30 Aug 2023 19:43 )  PTT:29.4 sec  CAPILLARY BLOOD GLUCOSE            Urinalysis Basic - ( 31 Aug 2023 00:30 )    Color: x / Appearance: x / SG: x / pH: x  Gluc: 154 mg/dL / Ketone: x  / Bili: x / Urobili: x   Blood: x / Protein: x / Nitrite: x   Leuk Esterase: x / RBC: x / WBC x   Sq Epi: x / Non Sq Epi: x / Bacteria: x      ___________________________________________________  MICRO  Recent Cultures:    ___________________________________________________  MEDICATIONS  (STANDING):  acetaminophen   Oral Liquid .. 1000 milliGRAM(s) Oral every 6 hours  ampicillin/sulbactam  IVPB 3 Gram(s) IV Intermittent every 6 hours  ampicillin/sulbactam  IVPB      atorvastatin 10 milliGRAM(s) Oral at bedtime  chlorhexidine 0.12% Liquid 15 milliLiter(s) Swish and Spit two times a day  chlorhexidine 2% Cloths 1 Application(s) Topical daily  enoxaparin Injectable 40 milliGRAM(s) SubCutaneous every 24 hours  gabapentin Solution 200 milliGRAM(s) Oral three times a day  levothyroxine Injectable 60 MICROGram(s) IV Push at bedtime  pantoprazole  Injectable 40 milliGRAM(s) IV Push daily  polyethylene glycol 3350 17 Gram(s) Oral daily  senna 2 Tablet(s) Oral at bedtime    MEDICATIONS  (PRN):  ondansetron Injectable 4 milliGRAM(s) IV Push every 6 hours PRN Nausea and/or Vomiting  oxyCODONE    Solution 5 milliGRAM(s) Oral every 4 hours PRN Moderate Pain (4 - 6)  oxyCODONE    Solution 10 milliGRAM(s) Oral every 4 hours PRN Severe Pain (7 - 10)          Assessment & Plan      [facility]  Plastic & Reconstructive Surgery, PGY-1  #[id number] Plastic Surgery Progress Note (pg [facility]: [id number], [facility]: [phone])    SUBJECTIVE      OBJECTIVE  ___________________________________________________  VITAL SIGNS / I&O's   Vital Signs Last 24 Hrs  T(C): 36.4 (31 Aug 2023 12:00), Max: 37 (31 Aug 2023 04:00)  T(F): 97.6 (31 Aug 2023 12:00), Max: 98.6 (31 Aug 2023 04:00)  HR: 64 (31 Aug 2023 15:00) (54 - 83)  BP: 125/70 (31 Aug 2023 15:00) (122/75 - 145/77)  BP(mean): 87 (31 Aug 2023 15:00) (87 - 116)  RR: 15 (31 Aug 2023 15:00) (10 - 24)  SpO2: 95% (31 Aug 2023 15:00) (94% - 100%)    Parameters below as of 31 Aug 2023 08:00  Patient On (Oxygen Delivery Method): tracheostomy collar  O2 Flow (L/min): 6  O2 Concentration (%): 40      30 Aug 2023 07:01  -  31 Aug 2023 07:00  --------------------------------------------------------  IN:    IV PiggyBack: 1200 mL    Lactated Ringers: 1500 mL  Total IN: 2700 mL    OUT:    Bulb (mL): 50 mL    Bulb (mL): 5 mL    Indwelling Catheter - Urethral (mL): 1235 mL    VAC (Vacuum Assisted Closure) System (mL): 0 mL  Total OUT: 1290 mL    Total NET: 1410 mL      31 Aug 2023 07:01  -  31 Aug 2023 16:41  --------------------------------------------------------  IN:    Pivot 1.5: 100 mL  Total IN: 100 mL    OUT:    Indwelling Catheter - Urethral (mL): 125 mL    Voided (mL): 50 mL  Total OUT: 175 mL    Total NET: -75 mL        ___________________________________________________  PHYSICAL EXAM    CONSTITUTIONAL: no apparent distress  PSYCH: Appropriate insight/judgment; A+O x 3, mood and affect appropriate  HENT: CN II-XII grossly intact, No conjunctival or scleral injection, non-icteric, flap without congestions or increased edema. Cook doppler triphasic Appears well perfused and will out drainage  NECK: Supple, symmetric and without tracheal deviation   RESP: No respiratory distress, no use of accessory muscles  GI: No rebound, no guarding; no palpable masses  SKIN: No rashes or ulcers noted    ___________________________________________________  LABS                        12.1   12.10 )-----------( 225      ( 31 Aug 2023 00:30 )             37.7     31 Aug 2023 00:30    147    |  111    |  8      ----------------------------<  154    3.9     |  21     |  0.73     Ca    9.3        31 Aug 2023 00:30  Phos  2.0       31 Aug 2023 00:30  Mg     2.10      31 Aug 2023 00:30      PT/INR - ( 30 Aug 2023 19:43 )   PT: 10.6 sec;   INR: 0.94 ratio         PTT - ( 30 Aug 2023 19:43 )  PTT:29.4 sec  CAPILLARY BLOOD GLUCOSE            Urinalysis Basic - ( 31 Aug 2023 00:30 )    Color: x / Appearance: x / SG: x / pH: x  Gluc: 154 mg/dL / Ketone: x  / Bili: x / Urobili: x   Blood: x / Protein: x / Nitrite: x   Leuk Esterase: x / RBC: x / WBC x   Sq Epi: x / Non Sq Epi: x / Bacteria: x      ___________________________________________________  MICRO  Recent Cultures:    ___________________________________________________  MEDICATIONS  (STANDING):  acetaminophen   Oral Liquid .. 1000 milliGRAM(s) Oral every 6 hours  ampicillin/sulbactam  IVPB 3 Gram(s) IV Intermittent every 6 hours  ampicillin/sulbactam  IVPB      atorvastatin 10 milliGRAM(s) Oral at bedtime  chlorhexidine 0.12% Liquid 15 milliLiter(s) Swish and Spit two times a day  chlorhexidine 2% Cloths 1 Application(s) Topical daily  enoxaparin Injectable 40 milliGRAM(s) SubCutaneous every 24 hours  gabapentin Solution 200 milliGRAM(s) Oral three times a day  levothyroxine Injectable 60 MICROGram(s) IV Push at bedtime  pantoprazole  Injectable 40 milliGRAM(s) IV Push daily  polyethylene glycol 3350 17 Gram(s) Oral daily  senna 2 Tablet(s) Oral at bedtime    MEDICATIONS  (PRN):  ondansetron Injectable 4 milliGRAM(s) IV Push every 6 hours PRN Nausea and/or Vomiting  oxyCODONE    Solution 5 milliGRAM(s) Oral every 4 hours PRN Moderate Pain (4 - 6)  oxyCODONE    Solution 10 milliGRAM(s) Oral every 4 hours PRN Severe Pain (7 - 10)            A/P: 62yFemale with PMHx HLD, hypothyroidism, obesity s/p R hemiglossectomy, SND I-IV neck dissection, tracheostomy, and radial forearm free flap 8/30.  Currently SICU for q1 check    >Monitor cook doppler, flap appearance, flap congestion  >Vac to remain until POD7  >Appreciate primary care by SANA Reece  Plastic & Reconstructive Surgery, PGY-1  #[id number]

## 2023-09-01 NOTE — PROGRESS NOTE ADULT - NUTRITIONAL ASSESSMENT
A/P: 62yFemale with PMHx HLD, hypothyroidism, obesity s/p R hemiglossectomy, SND I-IV neck dissection, tracheostomy, and radial forearm free flap 8/30.  Currently SICU for q1 check    >Monitor cook doppler, flap appearance, flap congestion  >Vac to remain until POD7  >Appreciate primary care by SICU
A/P: 62yFemale with PMHx HLD, hypothyroidism, obesity s/p R hemiglossectomy, SND I-IV neck dissection, tracheostomy, and radial forearm free flap 8/30.  Currently SICU for q1 check    >Monitor cook doppler, flap appearance, flap congestion  >Vac to remain until POD7  >Appreciate primary care by SICU

## 2023-09-02 LAB
ANION GAP SERPL CALC-SCNC: 10 MMOL/L — SIGNIFICANT CHANGE UP (ref 7–14)
BUN SERPL-MCNC: 15 MG/DL — SIGNIFICANT CHANGE UP (ref 7–23)
CALCIUM SERPL-MCNC: 9 MG/DL — SIGNIFICANT CHANGE UP (ref 8.4–10.5)
CHLORIDE SERPL-SCNC: 110 MMOL/L — HIGH (ref 98–107)
CO2 SERPL-SCNC: 26 MMOL/L — SIGNIFICANT CHANGE UP (ref 22–31)
CREAT SERPL-MCNC: 0.76 MG/DL — SIGNIFICANT CHANGE UP (ref 0.5–1.3)
EGFR: 89 ML/MIN/1.73M2 — SIGNIFICANT CHANGE UP
GLUCOSE SERPL-MCNC: 118 MG/DL — HIGH (ref 70–99)
HCT VFR BLD CALC: 35.4 % — SIGNIFICANT CHANGE UP (ref 34.5–45)
HGB BLD-MCNC: 10.7 G/DL — LOW (ref 11.5–15.5)
MAGNESIUM SERPL-MCNC: 2.4 MG/DL — SIGNIFICANT CHANGE UP (ref 1.6–2.6)
MCHC RBC-ENTMCNC: 30.2 GM/DL — LOW (ref 32–36)
MCHC RBC-ENTMCNC: 30.4 PG — SIGNIFICANT CHANGE UP (ref 27–34)
MCV RBC AUTO: 100.6 FL — HIGH (ref 80–100)
NRBC # BLD: 0 /100 WBCS — SIGNIFICANT CHANGE UP (ref 0–0)
NRBC # FLD: 0 K/UL — SIGNIFICANT CHANGE UP (ref 0–0)
PHOSPHATE SERPL-MCNC: 3.5 MG/DL — SIGNIFICANT CHANGE UP (ref 2.5–4.5)
PLATELET # BLD AUTO: 204 K/UL — SIGNIFICANT CHANGE UP (ref 150–400)
POTASSIUM SERPL-MCNC: 3.6 MMOL/L — SIGNIFICANT CHANGE UP (ref 3.5–5.3)
POTASSIUM SERPL-SCNC: 3.6 MMOL/L — SIGNIFICANT CHANGE UP (ref 3.5–5.3)
RBC # BLD: 3.52 M/UL — LOW (ref 3.8–5.2)
RBC # FLD: 14.6 % — HIGH (ref 10.3–14.5)
SODIUM SERPL-SCNC: 146 MMOL/L — HIGH (ref 135–145)
WBC # BLD: 8.04 K/UL — SIGNIFICANT CHANGE UP (ref 3.8–10.5)
WBC # FLD AUTO: 8.04 K/UL — SIGNIFICANT CHANGE UP (ref 3.8–10.5)

## 2023-09-02 PROCEDURE — 99232 SBSQ HOSP IP/OBS MODERATE 35: CPT | Mod: GC

## 2023-09-02 RX ADMIN — ENOXAPARIN SODIUM 40 MILLIGRAM(S): 100 INJECTION SUBCUTANEOUS at 17:58

## 2023-09-02 RX ADMIN — GABAPENTIN 200 MILLIGRAM(S): 400 CAPSULE ORAL at 15:18

## 2023-09-02 RX ADMIN — Medication 60 MICROGRAM(S): at 21:50

## 2023-09-02 RX ADMIN — OXYCODONE HYDROCHLORIDE 5 MILLIGRAM(S): 5 TABLET ORAL at 22:30

## 2023-09-02 RX ADMIN — GABAPENTIN 200 MILLIGRAM(S): 400 CAPSULE ORAL at 21:48

## 2023-09-02 RX ADMIN — Medication 1000 MILLIGRAM(S): at 00:28

## 2023-09-02 RX ADMIN — PANTOPRAZOLE SODIUM 40 MILLIGRAM(S): 20 TABLET, DELAYED RELEASE ORAL at 12:03

## 2023-09-02 RX ADMIN — CHLORHEXIDINE GLUCONATE 1 APPLICATION(S): 213 SOLUTION TOPICAL at 12:12

## 2023-09-02 RX ADMIN — Medication 1000 MILLIGRAM(S): at 17:58

## 2023-09-02 RX ADMIN — Medication 1000 MILLIGRAM(S): at 06:36

## 2023-09-02 RX ADMIN — ATORVASTATIN CALCIUM 10 MILLIGRAM(S): 80 TABLET, FILM COATED ORAL at 21:49

## 2023-09-02 RX ADMIN — GABAPENTIN 200 MILLIGRAM(S): 400 CAPSULE ORAL at 06:36

## 2023-09-02 RX ADMIN — CHLORHEXIDINE GLUCONATE 15 MILLILITER(S): 213 SOLUTION TOPICAL at 06:35

## 2023-09-02 RX ADMIN — Medication 1000 MILLIGRAM(S): at 12:03

## 2023-09-02 RX ADMIN — Medication 1000 MILLIGRAM(S): at 12:18

## 2023-09-02 RX ADMIN — OXYCODONE HYDROCHLORIDE 5 MILLIGRAM(S): 5 TABLET ORAL at 21:49

## 2023-09-02 RX ADMIN — Medication 1000 MILLIGRAM(S): at 18:28

## 2023-09-02 RX ADMIN — CHLORHEXIDINE GLUCONATE 15 MILLILITER(S): 213 SOLUTION TOPICAL at 17:57

## 2023-09-02 NOTE — PROGRESS NOTE ADULT - SUBJECTIVE AND OBJECTIVE BOX
OTOLARYNGOLOGY (ENT) PROGRESS NOTE    PATIENT: GIOVANNY CAMP  MRN: [id number]  : [birth year]  [id number]  DATE OF SERVICE:  23  			         Subjective/ Interval: Patient seen and examined at bedside. She is doing well. AFVSS, NAEON. Trach changed to 6UN75R this morning.     ALLERGIES:  No Known Allergies      MEDICATIONS:  Antiinfectives:     IV fluids:    Hematologic/Anticoagulation:  enoxaparin Injectable 40 milliGRAM(s) SubCutaneous every 24 hours    Pain medications/Neuro:  acetaminophen   Oral Liquid .. 1000 milliGRAM(s) Oral every 6 hours  gabapentin Solution 200 milliGRAM(s) Oral three times a day  ondansetron Injectable 4 milliGRAM(s) IV Push every 6 hours PRN  oxyCODONE    Solution 5 milliGRAM(s) Oral every 4 hours PRN  oxyCODONE    Solution 10 milliGRAM(s) Oral every 4 hours PRN    Endocrine Medications:   atorvastatin 10 milliGRAM(s) Oral at bedtime  levothyroxine Injectable 60 MICROGram(s) IV Push at bedtime    All other standing medications:   chlorhexidine 0.12% Liquid 15 milliLiter(s) Swish and Spit two times a day  chlorhexidine 2% Cloths 1 Application(s) Topical daily  pantoprazole  Injectable 40 milliGRAM(s) IV Push daily  polyethylene glycol 3350 17 Gram(s) Oral daily  senna 2 Tablet(s) Oral at bedtime    All other PRN medications:  hydrALAZINE Injectable 10 milliGRAM(s) IV Push every 6 hours PRN    Vital Signs Last 24 Hrs  T(C): 36.8 (02 Sep 2023 08:00), Max: 37.2 (01 Sep 2023 16:00)  T(F): 98.3 (02 Sep 2023 08:00), Max: 99 (01 Sep 2023 16:00)  HR: 96 (02 Sep 2023 08:00) (65 - 96)  BP: 132/72 (02 Sep 2023 04:00) (112/56 - 145/76)  BP(mean): 89 (02 Sep 2023 04:00) (73 - 107)  RR: 17 (02 Sep 2023 08:00) (15 - 26)  SpO2: 98% (02 Sep 2023 08:00) (95% - 100%)    Parameters below as of 02 Sep 2023 08:00  Patient On (Oxygen Delivery Method): tracheostomy collar  O2 Flow (L/min): 6  O2 Concentration (%): 28       @ 07:  -   @ 07:00  --------------------------------------------------------  IN:    Free Water: 1750 mL    Pivot 1.5: 1120 mL  Total IN: 2870 mL    OUT:    Bulb (mL): 55 mL    VAC (Vacuum Assisted Closure) System (mL): 0 mL    Voided (mL): 1750 mL  Total OUT: 1805 mL    Total NET: 1065 mL          23 @ 07:01  -  23 @ 07:00  --------------------------------------------------------  IN:  Total IN: 0 mL    OUT:    Bulb (mL): 55 mL    VAC (Vacuum Assisted Closure) System (mL): 0 mL  Total OUT: 55 mL    Total NET: -55 mL              PHYSICAL EXAM:  NAD, resting comfortably in bed   NGT sutured into R nares  6CN75R trach in place, secured with sutures. Changed to 6UN75R.  Improved secretions, no active bleeding  Soft suction passes easily, minimal mucoid secretions  Neck soft, flat, no hematoma or crepitus noted  On trach collar  Neck flat and supple, no collection. Incision closed with staples c/d/i, JPx1 w/ SS output, holding suction  Cook doppler with strong arterial signal. Intraoral skin paddle pink, well perfused. Incisions intact.  OC/OP: no erythema, bleeding, laceration  R forearm in ACE bandage, wound vac in place           LABS                       10.7   8.04  )-----------( 204      ( 02 Sep 2023 06:54 )             35.4    09-02    146<H>  |  110<H>  |  15  ----------------------------<  118<H>  3.6   |  26  |  0.76    Ca    9.0      02 Sep 2023 06:54  Phos  3.5       Mg     2.40                Coagulation Studies-     Urinalysis Basic - ( 02 Sep 2023 06:54 )    Color: x / Appearance: x / SG: x / pH: x  Gluc: 118 mg/dL / Ketone: x  / Bili: x / Urobili: x   Blood: x / Protein: x / Nitrite: x   Leuk Esterase: x / RBC: x / WBC x   Sq Epi: x / Non Sq Epi: x / Bacteria: x      Endocrine Panel-  Calcium: 9.0 mg/dL ( @ 06:54)                MICROBIOLOGY:        RADIOLOGY & ADDITIONAL STUDIES:    Assessment and Plan:  A/P: 62yFemale with s/p right tongue resection, SND I-IV neck dissection, tracheostomy placement w/ 6CN75R and radial forearm free flap . CXR confirmed NGT in place. Trach changed to 6UN75R .    PLAN:  - ERAS protocol  - Pain meds (Standing tylenol q6 hrs, gabapentin 600mg qd, opioids as needed)  - Agitation meds as needed to avoid excessive neck movement  - NOTHING around neck (no trach ties or collars), do not cut trach sutures  - Flap checks per PRS  - Baci 2x/d to neck incisions  - Lovenox starting POD1  - TF to goal  - OOB/ambulate  - PT/OT  - On humidified trach collar, saturating well  - Suction PRN, routine trach management  - Unasyn x24h  - Peridex swish and spit BID  - Esomeprazole 20 mg BID  - Monitor BP, maintain MAPs ; avoid pressors   - Bowel regimen (senna + miralax)  - will d/w attending starting CLD today      Page ENT with any questions/concerns.  Peds Page #[phone]  Adult Page #[phone]    Leonor Mendoza  23 @ 08:54

## 2023-09-02 NOTE — PROGRESS NOTE ADULT - SUBJECTIVE AND OBJECTIVE BOX
Plastic Surgery Progress Note (pg [facility]: [id number], [facility]: [phone])    SUBJECTIVE  The patient was seen and examined.     OBJECTIVE  ___________________________________________________  VITAL SIGNS / I&O's   Vital Signs Last 24 Hrs  T(C): 36.8 (02 Sep 2023 08:00), Max: 37.2 (01 Sep 2023 16:00)  T(F): 98.3 (02 Sep 2023 08:00), Max: 99 (01 Sep 2023 16:00)  HR: 96 (02 Sep 2023 08:00) (65 - 96)  BP: 132/72 (02 Sep 2023 04:00) (112/56 - 145/76)  BP(mean): 89 (02 Sep 2023 04:00) (73 - 107)  RR: 17 (02 Sep 2023 08:00) (15 - 26)  SpO2: 98% (02 Sep 2023 08:00) (95% - 100%)    Parameters below as of 02 Sep 2023 08:00  Patient On (Oxygen Delivery Method): tracheostomy collar  O2 Flow (L/min): 6  O2 Concentration (%): 28      01 Sep 2023 07:01  -  02 Sep 2023 07:00  --------------------------------------------------------  IN:    Free Water: 1750 mL    Pivot 1.5: 1120 mL  Total IN: 2870 mL    OUT:    Bulb (mL): 55 mL    VAC (Vacuum Assisted Closure) System (mL): 0 mL    Voided (mL): 1750 mL  Total OUT: 1805 mL    Total NET: 1065 mL        ___________________________________________________  PHYSICAL EXAM        CONSTITUTIONAL: no apparent distress  NEURO:Flap with strong triphasic cook doppler, flap soft, appropriate cap refill, good color, suture line, neck suture line slightly erythematous R>L side, c/d/i  NECK: Supple, symmetric and without tracheal deviation   RESP: No respiratory distress, no use of accessory muscles  GI: No rebound, no guarding; no palpable masses  SKIN: No rashes or ulcers noted    ___________________________________________________  LABS                        10.7   8.04  )-----------( 204      ( 02 Sep 2023 06:54 )             35.4     02 Sep 2023 06:54    146    |  110    |  15     ----------------------------<  118    3.6     |  26     |  0.76     Ca    9.0        02 Sep 2023 06:54  Phos  3.5       02 Sep 2023 06:54  Mg     2.40      02 Sep 2023 06:54        CAPILLARY BLOOD GLUCOSE            Urinalysis Basic - ( 02 Sep 2023 06:54 )    Color: x / Appearance: x / SG: x / pH: x  Gluc: 118 mg/dL / Ketone: x  / Bili: x / Urobili: x   Blood: x / Protein: x / Nitrite: x   Leuk Esterase: x / RBC: x / WBC x   Sq Epi: x / Non Sq Epi: x / Bacteria: x      ___________________________________________________  MICRO  Recent Cultures:    ___________________________________________________  MEDICATIONS  (STANDING):  acetaminophen   Oral Liquid .. 1000 milliGRAM(s) Oral every 6 hours  atorvastatin 10 milliGRAM(s) Oral at bedtime  chlorhexidine 0.12% Liquid 15 milliLiter(s) Swish and Spit two times a day  chlorhexidine 2% Cloths 1 Application(s) Topical daily  enoxaparin Injectable 40 milliGRAM(s) SubCutaneous every 24 hours  gabapentin Solution 200 milliGRAM(s) Oral three times a day  levothyroxine Injectable 60 MICROGram(s) IV Push at bedtime  pantoprazole  Injectable 40 milliGRAM(s) IV Push daily  polyethylene glycol 3350 17 Gram(s) Oral daily  senna 2 Tablet(s) Oral at bedtime    MEDICATIONS  (PRN):  hydrALAZINE Injectable 10 milliGRAM(s) IV Push every 6 hours PRN SBP >140  ondansetron Injectable 4 milliGRAM(s) IV Push every 6 hours PRN Nausea and/or Vomiting  oxyCODONE    Solution 5 milliGRAM(s) Oral every 4 hours PRN Moderate Pain (4 - 6)  oxyCODONE    Solution 10 milliGRAM(s) Oral every 4 hours PRN Severe Pain (7 - 10)

## 2023-09-02 NOTE — OCCUPATIONAL THERAPY INITIAL EVALUATION ADULT - GENERAL OBSERVATIONS, REHAB EVAL
Pt. received sitting in chair next to bed. No acute distress. Patient agreed to evaluation from Occupational Therapist. +IV, +Tele, +NG Tube, +Trach (capped), +Left UE Ace Wrap, +Pulse ox to Right finger, +ISABELL Bulb Drain, +Wound VAC. BP: 143/77 mmHg, HR: 69 bpm.

## 2023-09-02 NOTE — PROGRESS NOTE ADULT - ASSESSMENT
A/P: 62yFemale with PMHx HLD, hypothyroidism, obesity s/p R hemiglossectomy, SND I-IV neck dissection, tracheostomy, and radial forearm free flap 8/30.  Currently SICU for q1 check    >ERAS protocol  - q4 flap checks  - cook doppler  >Vac holding suction  >Appreciate care by SICU

## 2023-09-02 NOTE — PROGRESS NOTE ADULT - SUBJECTIVE AND OBJECTIVE BOX
24 HOUR EVENTS:  - free water flushes 500cc q6hr  - hydral 10q6hr prn SBP >140  - q4hr flap checks.   - neck ranjana removed      SUBJECTIVE/ROS:  no new complaints    NEURO  Exam: AOx4, NAD, no focal deficits  Meds: acetaminophen   Oral Liquid .. 1000 milliGRAM(s) Oral every 6 hours  gabapentin Solution 200 milliGRAM(s) Oral three times a day  ondansetron Injectable 4 milliGRAM(s) IV Push every 6 hours PRN Nausea and/or Vomiting  oxyCODONE    Solution 5 milliGRAM(s) Oral every 4 hours PRN Moderate Pain (4 - 6)  oxyCODONE    Solution 10 milliGRAM(s) Oral every 4 hours PRN Severe Pain (7 - 10)      RESPIRATORY  RR: 19 (09-01-23 @ 20:00) (14 - 26)  SpO2: 95% (09-01-23 @ 20:00) (95% - 100%)  Wt(kg): --  Exam: Trach in place, CTA b/l. No murmurs, rubs, gallops appreciated.   Meds:       CARDIOVASCULAR  HR: 70 (09-01-23 @ 20:00) (66 - 84)  BP: 122/71 (09-01-23 @ 20:00) (106/80 - 144/81)  BP(mean): 86 (09-01-23 @ 20:00) (73 - 107)  Cardiac Rhythm: NSR  Meds: hydrALAZINE Injectable 10 milliGRAM(s) IV Push every 6 hours PRN SBP >140        GI/NUTRITION  Exam: Soft, non-distended, non-tender.   Diet: TF  Meds: pantoprazole  Injectable 40 milliGRAM(s) IV Push daily  polyethylene glycol 3350 17 Gram(s) Oral daily  senna 2 Tablet(s) Oral at bedtime      GENITOURINARY  I&O's Detail    08-31 @ 07:01  -  09-01 @ 07:00  --------------------------------------------------------  IN:    IV PiggyBack: 100 mL    Pivot 1.5: 660 mL  Total IN: 760 mL    OUT:    Bulb (mL): 25 mL    Bulb (mL): 65 mL    Indwelling Catheter - Urethral (mL): 125 mL    VAC (Vacuum Assisted Closure) System (mL): 0 mL    Voided (mL): 400 mL  Total OUT: 615 mL    Total NET: 145 mL      09-01 @ 07:01  -  09-02 @ 01:37  --------------------------------------------------------  IN:    Free Water: 750 mL    Pivot 1.5: 880 mL  Total IN: 1630 mL    OUT:    Bulb (mL): 25 mL    VAC (Vacuum Assisted Closure) System (mL): 0 mL    Voided (mL): 1450 mL  Total OUT: 1475 mL    Total NET: 155 mL      09-01    150<H>  |  113<H>  |  15  ----------------------------<  114<H>  3.6   |  23  |  0.86    Ca    9.1      01 Sep 2023 02:25  Phos  2.5     09-01  Mg     2.40     09-01  Meds:       HEMATOLOGIC  Meds: enoxaparin Injectable 40 milliGRAM(s) SubCutaneous every 24 hours  [x] VTE Prophylaxis                        11.4   11.39 )-----------( 224      ( 01 Sep 2023 02:25 )             36.5       Transfusion     [ ] PRBC   [ ] Platelets   [ ] FFP   [ ] Cryoprecipitate      INFECTIOUS DISEASES  T(C): 36.7 (09-01-23 @ 20:00), Max: 37.2 (09-01-23 @ 16:00)  Wt(kg): --  WBC Count: 11.39 K/uL (09-01 @ 02:25)    Recent Cultures:    Meds:       ENDOCRINE  Capillary Blood Glucose    Meds: atorvastatin 10 milliGRAM(s) Oral at bedtime  levothyroxine Injectable 60 MICROGram(s) IV Push at bedtime    OTHER MEDICATIONS:  chlorhexidine 0.12% Liquid 15 milliLiter(s) Swish and Spit two times a day  chlorhexidine 2% Cloths 1 Application(s) Topical daily      CODE STATUS: full    IMAGING: 24 HOUR EVENTS:  - free water flushes 250cc q6hr  - hydral 10q6hr prn SBP >140  - q4hr flap checks.   - neck ranjana removed      SUBJECTIVE/ROS:  no new complaints    NEURO  Exam: AOx4, NAD, no focal deficits  Meds: acetaminophen   Oral Liquid .. 1000 milliGRAM(s) Oral every 6 hours  gabapentin Solution 200 milliGRAM(s) Oral three times a day  ondansetron Injectable 4 milliGRAM(s) IV Push every 6 hours PRN Nausea and/or Vomiting  oxyCODONE    Solution 5 milliGRAM(s) Oral every 4 hours PRN Moderate Pain (4 - 6)  oxyCODONE    Solution 10 milliGRAM(s) Oral every 4 hours PRN Severe Pain (7 - 10)      RESPIRATORY  RR: 19 (09-01-23 @ 20:00) (14 - 26)  SpO2: 95% (09-01-23 @ 20:00) (95% - 100%)  Wt(kg): --  Exam: Trach in place, CTA b/l. No murmurs, rubs, gallops appreciated.   Meds:       CARDIOVASCULAR  HR: 70 (09-01-23 @ 20:00) (66 - 84)  BP: 122/71 (09-01-23 @ 20:00) (106/80 - 144/81)  BP(mean): 86 (09-01-23 @ 20:00) (73 - 107)  Cardiac Rhythm: NSR  Meds: hydrALAZINE Injectable 10 milliGRAM(s) IV Push every 6 hours PRN SBP >140        GI/NUTRITION  Exam: Soft, non-distended, non-tender.   Diet: TF  Meds: pantoprazole  Injectable 40 milliGRAM(s) IV Push daily  polyethylene glycol 3350 17 Gram(s) Oral daily  senna 2 Tablet(s) Oral at bedtime      GENITOURINARY  I&O's Detail    08-31 @ 07:01  -  09-01 @ 07:00  --------------------------------------------------------  IN:    IV PiggyBack: 100 mL    Pivot 1.5: 660 mL  Total IN: 760 mL    OUT:    Bulb (mL): 25 mL    Bulb (mL): 65 mL    Indwelling Catheter - Urethral (mL): 125 mL    VAC (Vacuum Assisted Closure) System (mL): 0 mL    Voided (mL): 400 mL  Total OUT: 615 mL    Total NET: 145 mL      09-01 @ 07:01  -  09-02 @ 01:37  --------------------------------------------------------  IN:    Free Water: 750 mL    Pivot 1.5: 880 mL  Total IN: 1630 mL    OUT:    Bulb (mL): 25 mL    VAC (Vacuum Assisted Closure) System (mL): 0 mL    Voided (mL): 1450 mL  Total OUT: 1475 mL    Total NET: 155 mL      09-01    150<H>  |  113<H>  |  15  ----------------------------<  114<H>  3.6   |  23  |  0.86    Ca    9.1      01 Sep 2023 02:25  Phos  2.5     09-01  Mg     2.40     09-01  Meds:       HEMATOLOGIC  Meds: enoxaparin Injectable 40 milliGRAM(s) SubCutaneous every 24 hours  [x] VTE Prophylaxis                        11.4   11.39 )-----------( 224      ( 01 Sep 2023 02:25 )             36.5       Transfusion     [ ] PRBC   [ ] Platelets   [ ] FFP   [ ] Cryoprecipitate      INFECTIOUS DISEASES  T(C): 36.7 (09-01-23 @ 20:00), Max: 37.2 (09-01-23 @ 16:00)  Wt(kg): --  WBC Count: 11.39 K/uL (09-01 @ 02:25)    Recent Cultures:    Meds:       ENDOCRINE  Capillary Blood Glucose    Meds: atorvastatin 10 milliGRAM(s) Oral at bedtime  levothyroxine Injectable 60 MICROGram(s) IV Push at bedtime    OTHER MEDICATIONS:  chlorhexidine 0.12% Liquid 15 milliLiter(s) Swish and Spit two times a day  chlorhexidine 2% Cloths 1 Application(s) Topical daily      CODE STATUS: full    IMAGING:

## 2023-09-02 NOTE — OCCUPATIONAL THERAPY INITIAL EVALUATION ADULT - LIVES WITH, PROFILE
Pt. reports she lives alone in an apartment within a house with 1 step to enter. Once inside, pt. reports she has full flight of steps to negotiate to 2nd floor where apartment is located. Per pt., she has a bathtub in her bathroom. Pt. explains she may plan to stay with either her brother or sister post-hospital discharge, both have homes with no steps to enter and bedroom/bathroom set-up on the main level.

## 2023-09-02 NOTE — PROGRESS NOTE ADULT - ASSESSMENT
61 y/o female with PMHx HLD, hypothyroidism, obesity, found to have malignant neoplasm of tongue. Patient is now s/p R hemiglossectomy, R neck dissection with radial freeflap and tracheostomy on 8/30. SICU consulted fo flap checks.     PLAN:   Neurologic:   - pain control prn- PO tylenol, oxy, gabapentin  - A&OX4, no issues   - q4 flap checks    Respiratory: trach   - trach and trach collar  - maintain SPO2 > 92%    Cardiovascular:   - MAP > 65  - continue home statin   - hydral 10q6hr prn SBP >140    Gastrointestinal/Nutrition:   - CXR (8/31): Enteric tube terminates in the stomach  - Diet: NPO with TFs  - protonix IV daily   - bowel regimen  - zofran prn     Renal/Genitourinary:   - monitor I&Os  - monitor electrolytes, replete prn  - free water flushes via NG, 500 q6     Hematologic:   - monitor H/H on daily CBC  - DVT ppx: Lovenox     Infectious Disease:   - s/p Unasyn (8/30-8/31)  - monitor WBC and trend fever curve     Endocrine:   - continue home levothyroxine   - monitor glucose on BMP (goal 140)    Disposition: downgrade to floor    61 y/o female with PMHx HLD, hypothyroidism, obesity, found to have malignant neoplasm of tongue. Patient is now s/p R hemiglossectomy, R neck dissection with radial freeflap and tracheostomy on 8/30. SICU consulted fo flap checks.     PLAN:   Neurologic:   - pain control prn- PO tylenol, oxy, gabapentin  - A&OX4, no issues   - q4 flap checks    Respiratory: trach   - trach and trach collar  - maintain SPO2 > 92%    Cardiovascular:   - MAP > 65  - continue home statin   - hydral 10q6hr prn SBP >140    Gastrointestinal/Nutrition:   - CXR (8/31): Enteric tube terminates in the stomach  - Diet: NPO with TFs  - protonix IV daily   - bowel regimen  - zofran prn     Renal/Genitourinary:   - monitor I&Os  - monitor electrolytes, replete prn  - free water flushes via NG, 250 q6     Hematologic:   - monitor H/H on daily CBC  - DVT ppx: Lovenox     Infectious Disease:   - s/p Unasyn (8/30-8/31)  - monitor WBC and trend fever curve     Endocrine:   - continue home levothyroxine   - monitor glucose on BMP (goal 140)    Disposition: listed

## 2023-09-02 NOTE — OCCUPATIONAL THERAPY INITIAL EVALUATION ADULT - PERTINENT HX OF CURRENT PROBLEM, REHAB EVAL
Pt is a 62 year old female with dx of malignant neoplasm of tongue unspecified. Pt is now s/p Right hemiglossectomy, Right neck dissection with radial freeflap and tracheostomy on 8/30/23.

## 2023-09-02 NOTE — OCCUPATIONAL THERAPY INITIAL EVALUATION ADULT - MD ORDER
Occupational Therapy (OT) to evaluate and treat. Per NANCY Colón, pt is okay to participate in OT evaluation and perform activity as tolerated.

## 2023-09-03 LAB
ANION GAP SERPL CALC-SCNC: 13 MMOL/L — SIGNIFICANT CHANGE UP (ref 7–14)
BLD GP AB SCN SERPL QL: NEGATIVE — SIGNIFICANT CHANGE UP
BUN SERPL-MCNC: 15 MG/DL — SIGNIFICANT CHANGE UP (ref 7–23)
CALCIUM SERPL-MCNC: 9.2 MG/DL — SIGNIFICANT CHANGE UP (ref 8.4–10.5)
CHLORIDE SERPL-SCNC: 110 MMOL/L — HIGH (ref 98–107)
CO2 SERPL-SCNC: 22 MMOL/L — SIGNIFICANT CHANGE UP (ref 22–31)
CREAT SERPL-MCNC: 0.7 MG/DL — SIGNIFICANT CHANGE UP (ref 0.5–1.3)
EGFR: 98 ML/MIN/1.73M2 — SIGNIFICANT CHANGE UP
GLUCOSE SERPL-MCNC: 109 MG/DL — HIGH (ref 70–99)
HCT VFR BLD CALC: 34.8 % — SIGNIFICANT CHANGE UP (ref 34.5–45)
HGB BLD-MCNC: 11.1 G/DL — LOW (ref 11.5–15.5)
MAGNESIUM SERPL-MCNC: 2.3 MG/DL — SIGNIFICANT CHANGE UP (ref 1.6–2.6)
MCHC RBC-ENTMCNC: 31.4 PG — SIGNIFICANT CHANGE UP (ref 27–34)
MCHC RBC-ENTMCNC: 31.9 GM/DL — LOW (ref 32–36)
MCV RBC AUTO: 98.6 FL — SIGNIFICANT CHANGE UP (ref 80–100)
NRBC # BLD: 0 /100 WBCS — SIGNIFICANT CHANGE UP (ref 0–0)
NRBC # FLD: 0 K/UL — SIGNIFICANT CHANGE UP (ref 0–0)
PHOSPHATE SERPL-MCNC: 3.2 MG/DL — SIGNIFICANT CHANGE UP (ref 2.5–4.5)
PLATELET # BLD AUTO: 223 K/UL — SIGNIFICANT CHANGE UP (ref 150–400)
POTASSIUM SERPL-MCNC: 3.9 MMOL/L — SIGNIFICANT CHANGE UP (ref 3.5–5.3)
POTASSIUM SERPL-SCNC: 3.9 MMOL/L — SIGNIFICANT CHANGE UP (ref 3.5–5.3)
RBC # BLD: 3.53 M/UL — LOW (ref 3.8–5.2)
RBC # FLD: 13.8 % — SIGNIFICANT CHANGE UP (ref 10.3–14.5)
RH IG SCN BLD-IMP: POSITIVE — SIGNIFICANT CHANGE UP
SODIUM SERPL-SCNC: 145 MMOL/L — SIGNIFICANT CHANGE UP (ref 135–145)
WBC # BLD: 7 K/UL — SIGNIFICANT CHANGE UP (ref 3.8–10.5)
WBC # FLD AUTO: 7 K/UL — SIGNIFICANT CHANGE UP (ref 3.8–10.5)

## 2023-09-03 PROCEDURE — 99232 SBSQ HOSP IP/OBS MODERATE 35: CPT | Mod: GC

## 2023-09-03 RX ORDER — ACETAMINOPHEN 500 MG
975 TABLET ORAL ONCE
Refills: 0 | Status: COMPLETED | OUTPATIENT
Start: 2023-09-03 | End: 2023-09-03

## 2023-09-03 RX ORDER — OXYMETAZOLINE HYDROCHLORIDE 0.5 MG/ML
2 SPRAY NASAL
Refills: 0 | Status: DISCONTINUED | OUTPATIENT
Start: 2023-09-03 | End: 2023-09-06

## 2023-09-03 RX ORDER — AMPICILLIN SODIUM AND SULBACTAM SODIUM 250; 125 MG/ML; MG/ML
3 INJECTION, POWDER, FOR SUSPENSION INTRAMUSCULAR; INTRAVENOUS EVERY 6 HOURS
Refills: 0 | Status: COMPLETED | OUTPATIENT
Start: 2023-09-03 | End: 2023-09-04

## 2023-09-03 RX ORDER — INFLUENZA VIRUS VACCINE 15; 15; 15; 15 UG/.5ML; UG/.5ML; UG/.5ML; UG/.5ML
0.5 SUSPENSION INTRAMUSCULAR ONCE
Refills: 0 | Status: DISCONTINUED | OUTPATIENT
Start: 2023-09-03 | End: 2023-09-06

## 2023-09-03 RX ADMIN — PANTOPRAZOLE SODIUM 40 MILLIGRAM(S): 20 TABLET, DELAYED RELEASE ORAL at 12:22

## 2023-09-03 RX ADMIN — CHLORHEXIDINE GLUCONATE 1 APPLICATION(S): 213 SOLUTION TOPICAL at 12:25

## 2023-09-03 RX ADMIN — Medication 1000 MILLIGRAM(S): at 12:46

## 2023-09-03 RX ADMIN — Medication 1000 MILLIGRAM(S): at 06:58

## 2023-09-03 RX ADMIN — CHLORHEXIDINE GLUCONATE 15 MILLILITER(S): 213 SOLUTION TOPICAL at 06:59

## 2023-09-03 RX ADMIN — Medication 1000 MILLIGRAM(S): at 23:45

## 2023-09-03 RX ADMIN — AMPICILLIN SODIUM AND SULBACTAM SODIUM 200 GRAM(S): 250; 125 INJECTION, POWDER, FOR SUSPENSION INTRAMUSCULAR; INTRAVENOUS at 17:56

## 2023-09-03 RX ADMIN — AMPICILLIN SODIUM AND SULBACTAM SODIUM 200 GRAM(S): 250; 125 INJECTION, POWDER, FOR SUSPENSION INTRAMUSCULAR; INTRAVENOUS at 12:17

## 2023-09-03 RX ADMIN — Medication 1000 MILLIGRAM(S): at 12:16

## 2023-09-03 RX ADMIN — CHLORHEXIDINE GLUCONATE 15 MILLILITER(S): 213 SOLUTION TOPICAL at 18:03

## 2023-09-03 RX ADMIN — Medication 1000 MILLIGRAM(S): at 18:26

## 2023-09-03 RX ADMIN — GABAPENTIN 200 MILLIGRAM(S): 400 CAPSULE ORAL at 21:37

## 2023-09-03 RX ADMIN — ENOXAPARIN SODIUM 40 MILLIGRAM(S): 100 INJECTION SUBCUTANEOUS at 17:57

## 2023-09-03 RX ADMIN — GABAPENTIN 200 MILLIGRAM(S): 400 CAPSULE ORAL at 15:29

## 2023-09-03 RX ADMIN — ATORVASTATIN CALCIUM 10 MILLIGRAM(S): 80 TABLET, FILM COATED ORAL at 21:38

## 2023-09-03 RX ADMIN — Medication 1000 MILLIGRAM(S): at 07:28

## 2023-09-03 RX ADMIN — Medication 1000 MILLIGRAM(S): at 00:07

## 2023-09-03 RX ADMIN — Medication 1000 MILLIGRAM(S): at 23:15

## 2023-09-03 RX ADMIN — Medication 1000 MILLIGRAM(S): at 18:00

## 2023-09-03 RX ADMIN — AMPICILLIN SODIUM AND SULBACTAM SODIUM 200 GRAM(S): 250; 125 INJECTION, POWDER, FOR SUSPENSION INTRAMUSCULAR; INTRAVENOUS at 23:16

## 2023-09-03 RX ADMIN — GABAPENTIN 200 MILLIGRAM(S): 400 CAPSULE ORAL at 06:58

## 2023-09-03 RX ADMIN — Medication 60 MICROGRAM(S): at 21:37

## 2023-09-03 NOTE — PROGRESS NOTE ADULT - SUBJECTIVE AND OBJECTIVE BOX
Plastic Surgery Progress Note (pg [facility]: [id number], [facility]: [phone])    SUBJECTIVE  The patient was seen and examined. No complaints, pain well controlled.    OBJECTIVE  ___________________________________________________  VITAL SIGNS / I&O's   Vital Signs Last 24 Hrs  T(C): 36.8 (03 Sep 2023 00:00), Max: 36.8 (03 Sep 2023 00:00)  T(F): 98.2 (03 Sep 2023 00:00), Max: 98.2 (03 Sep 2023 00:00)  HR: 67 (03 Sep 2023 04:00) (67 - 78)  BP: 145/85 (03 Sep 2023 04:00) (129/73 - 151/90)  BP(mean): 103 (03 Sep 2023 04:00) (90 - 108)  RR: 16 (03 Sep 2023 04:00) (14 - 17)  SpO2: 98% (03 Sep 2023 04:00) (95% - 98%)    Parameters below as of 03 Sep 2023 04:00  Patient On (Oxygen Delivery Method): room air          02 Sep 2023 07:01  -  03 Sep 2023 07:00  --------------------------------------------------------  IN:    Free Water: 1000 mL    Pivot 1.5: 960 mL  Total IN: 1960 mL    OUT:    Bulb (mL): 85 mL    VAC (Vacuum Assisted Closure) System (mL): 0 mL    Voided (mL): 450 mL  Total OUT: 535 mL    Total NET: 1425 mL        ___________________________________________________  PHYSICAL EXAM      CONSTITUTIONAL: no apparent distress  NEURO:Flap with strong triphasic cook doppler, flap soft, appropriate cap refill, good color, suture line, neck suture line slightly erythematous R>L side, c/d/i  NECK: Supple, symmetric and without tracheal deviation   RESP: No respiratory distress, no use of accessory muscles  GI: No rebound, no guarding; no palpable masses  SKIN: No rashes or ulcers noted  ___________________________________________________  LABS                        11.1   7.00  )-----------( 223      ( 03 Sep 2023 06:00 )             34.8     02 Sep 2023 06:54    146    |  110    |  15     ----------------------------<  118    3.6     |  26     |  0.76     Ca    9.0        02 Sep 2023 06:54  Phos  3.5       02 Sep 2023 06:54  Mg     2.40      02 Sep 2023 06:54        CAPILLARY BLOOD GLUCOSE            Urinalysis Basic - ( 02 Sep 2023 06:54 )    Color: x / Appearance: x / SG: x / pH: x  Gluc: 118 mg/dL / Ketone: x  / Bili: x / Urobili: x   Blood: x / Protein: x / Nitrite: x   Leuk Esterase: x / RBC: x / WBC x   Sq Epi: x / Non Sq Epi: x / Bacteria: x        ___________________________________________________  MEDICATIONS  (STANDING):  acetaminophen   Oral Liquid .. 1000 milliGRAM(s) Oral every 6 hours  atorvastatin 10 milliGRAM(s) Oral at bedtime  chlorhexidine 0.12% Liquid 15 milliLiter(s) Swish and Spit two times a day  chlorhexidine 2% Cloths 1 Application(s) Topical daily  enoxaparin Injectable 40 milliGRAM(s) SubCutaneous every 24 hours  gabapentin Solution 200 milliGRAM(s) Oral three times a day  levothyroxine Injectable 60 MICROGram(s) IV Push at bedtime  pantoprazole  Injectable 40 milliGRAM(s) IV Push daily  polyethylene glycol 3350 17 Gram(s) Oral daily  senna 2 Tablet(s) Oral at bedtime    MEDICATIONS  (PRN):  ondansetron Injectable 4 milliGRAM(s) IV Push every 6 hours PRN Nausea and/or Vomiting  oxyCODONE    Solution 5 milliGRAM(s) Oral every 4 hours PRN Moderate Pain (4 - 6)  oxyCODONE    Solution 10 milliGRAM(s) Oral every 4 hours PRN Severe Pain (7 - 10)

## 2023-09-03 NOTE — PROGRESS NOTE ADULT - SUBJECTIVE AND OBJECTIVE BOX
OTOLARYNGOLOGY (ENT) PROGRESS NOTE    PATIENT: GIOVANNY CAMP  MRN: [id number]  : [birth year]  [id number]  DATE OF SERVICE:  23  			           Subjective/ Interval: Patient seen and examined at bedside. She is AFVSS, NAEON. She has tolerated capping since noon yesterday,     ALLERGIES:  No Known Allergies      MEDICATIONS:  Antiinfectives:     IV fluids:    Hematologic/Anticoagulation:  enoxaparin Injectable 40 milliGRAM(s) SubCutaneous every 24 hours    Pain medications/Neuro:  acetaminophen   Oral Liquid .. 1000 milliGRAM(s) Oral every 6 hours  gabapentin Solution 200 milliGRAM(s) Oral three times a day  ondansetron Injectable 4 milliGRAM(s) IV Push every 6 hours PRN  oxyCODONE    Solution 10 milliGRAM(s) Oral every 4 hours PRN  oxyCODONE    Solution 5 milliGRAM(s) Oral every 4 hours PRN    Endocrine Medications:   atorvastatin 10 milliGRAM(s) Oral at bedtime  levothyroxine Injectable 60 MICROGram(s) IV Push at bedtime    All other standing medications:   chlorhexidine 0.12% Liquid 15 milliLiter(s) Swish and Spit two times a day  chlorhexidine 2% Cloths 1 Application(s) Topical daily  pantoprazole  Injectable 40 milliGRAM(s) IV Push daily  polyethylene glycol 3350 17 Gram(s) Oral daily  senna 2 Tablet(s) Oral at bedtime    All other PRN medications:    Vital Signs Last 24 Hrs  T(C): 36.8 (03 Sep 2023 00:00), Max: 36.8 (03 Sep 2023 00:00)  T(F): 98.2 (03 Sep 2023 00:00), Max: 98.2 (03 Sep 2023 00:00)  HR: 82 (03 Sep 2023 08:00) (67 - 82)  BP: 145/85 (03 Sep 2023 04:00) (129/73 - 151/90)  BP(mean): 103 (03 Sep 2023 04:00) (90 - 108)  RR: 19 (03 Sep 2023 08:00) (14 - 19)  SpO2: 95% (03 Sep 2023 08:00) (95% - 98%)    Parameters below as of 03 Sep 2023 08:00  Patient On (Oxygen Delivery Method): room air           @ 07:01  -   @ 07:00  --------------------------------------------------------  IN:    Free Water: 1000 mL    Pivot 1.5: 960 mL  Total IN: 1960 mL    OUT:    Bulb (mL): 85 mL    VAC (Vacuum Assisted Closure) System (mL): 0 mL    Voided (mL): 450 mL  Total OUT: 535 mL    Total NET: 1425 mL          23 @ 07:01  -  23 @ 07:00  --------------------------------------------------------  IN:  Total IN: 0 mL    OUT:    Bulb (mL): 85 mL    VAC (Vacuum Assisted Closure) System (mL): 0 mL  Total OUT: 85 mL    Total NET: -85 mL              PHYSICAL EXAM:  NAD, resting comfortably in bed   NGT sutured into R nares  6CN75R trach in place, secured with sutures. Changed to 6UN75R w/ cap on  Improved secretions, no active bleeding  Soft suction passes easily, minimal mucoid secretions  Neck soft, flat, no hematoma or crepitus noted  On trach collar  Neck flat and supple, no collection. Incision closed with carmelita c/d/i  Cook doppler with strong arterial signal. Intraoral skin paddle pink, well perfused. Incisions intact.  OC/OP: no erythema, bleeding, laceration           LABS                       11.1   7.00  )-----------( 223      ( 03 Sep 2023 06:00 )             34.8    09-03    145  |  110<H>  |  15  ----------------------------<  109<H>  3.9   |  22  |  0.70    Ca    9.2      03 Sep 2023 06:00  Phos  3.2     09-03  Mg     2.30     09-03           Coagulation Studies-     Urinalysis Basic - ( 03 Sep 2023 06:00 )    Color: x / Appearance: x / SG: x / pH: x  Gluc: 109 mg/dL / Ketone: x  / Bili: x / Urobili: x   Blood: x / Protein: x / Nitrite: x   Leuk Esterase: x / RBC: x / WBC x   Sq Epi: x / Non Sq Epi: x / Bacteria: x      Endocrine Panel-  Calcium: 9.2 mg/dL ( @ 06:00)                MICROBIOLOGY:        RADIOLOGY & ADDITIONAL STUDIES:    Assessment and Plan:  GIOVANNY CAMP is a  62yFemale with s/p right tongue resection, SND I-IV neck dissection, tracheostomy placement w/ 6CN75R and radial forearm free flap . CXR confirmed NGT in place. Trach changed to 6UN75R .    PLAN:  - ERAS protocol  - Pain meds (Standing tylenol q6 hrs, gabapentin 600mg qd, opioids as needed)  - Agitation meds as needed to avoid excessive neck movement  - NOTHING around neck (no trach ties or collars), do not cut trach sutures  - Flap checks per PRS  - Baci 2x/d to neck incisions  - Lovenox  - TF to goal  - OOB/ambulate  - PT/OT  - On humidified trach collar, saturating well  - Suction PRN, routine trach management  - Unasyn x24h  - Peridex swish and spit BID  - Esomeprazole 20 mg BID  - Monitor BP, maintain MAPs ; avoid pressors   - Bowel regimen (senna + miralax)  - continue CLD  - will consider decannulation if tolerating capping    Page ENT with any questions/concerns.  Peds Page #[phone]  Adult Page #[phone]    Leonor Mendoza  23 @ 09:18

## 2023-09-03 NOTE — PROGRESS NOTE ADULT - ASSESSMENT
A/P: 62yFemale with PMHx HLD, hypothyroidism, obesity s/p R hemiglossectomy, SND I-IV neck dissection, tracheostomy, and radial forearm free flap 8/30.  Currently SICU for q1 check    >ERAS protocol  - q4 flap checks  - cook doppler  >Vac holding suction  >Appreciate care by SICU A/P: 62yFemale with PMHx HLD, hypothyroidism, obesity s/p R hemiglossectomy, SND I-IV neck dissection, tracheostomy, and radial forearm free flap 8/30.  Currently SICU for q1 check    >ERAS protocol  - q4 flap checks  - cook doppler  >Vac holding suction  >Appreciate care by SICU  - re start unasyn for neck erythema/induration

## 2023-09-03 NOTE — PROGRESS NOTE ADULT - ASSESSMENT
61 y/o female with PMHx HLD, hypothyroidism, obesity, found to have malignant neoplasm of tongue. Patient is now s/p R hemiglossectomy, R neck dissection with radial freeflap and tracheostomy on 8/30. SICU consulted fo flap checks.     PLAN:   Neurologic:   - pain control prn- PO tylenol, oxy, gabapentin  - A&OX4, no issues   - q4 flap checks    Respiratory: trach   - trach and trach collar  - maintain SPO2 > 92%    Cardiovascular:   - MAP > 65  - continue home statin   - hydral 10q6hr prn SBP >140    Gastrointestinal/Nutrition:   - CXR (8/31): Enteric tube terminates in the stomach  - Diet: NPO with TFs  - protonix IV daily   - bowel regimen  - zofran prn     Renal/Genitourinary:   - monitor I&Os  - monitor electrolytes, replete prn  - free water flushes via NG, 250 q6     Hematologic:   - monitor H/H on daily CBC  - DVT ppx: Lovenox     Infectious Disease:   - s/p Unasyn (8/30-8/31)  - monitor WBC and trend fever curve     Endocrine:   - continue home levothyroxine   - monitor glucose on BMP (goal 140)    Disposition: listed   61 y/o female with PMHx HLD, hypothyroidism, obesity, found to have malignant neoplasm of tongue. Patient is now s/p R hemiglossectomy, R neck dissection with radial freeflap and tracheostomy on 8/30. SICU consulted fo flap checks.     PLAN:   Neurologic:   - pain control prn- PO tylenol, oxy, gabapentin  - A&OX4, no issues   - q4 flap checks    Respiratory: trach   - trach and trach collar  - maintain SPO2 > 92%    Cardiovascular:   - MAP > 65  - continue home statin   - hydral 10q6hr prn SBP >140    Gastrointestinal/Nutrition:   - CXR (8/31): Enteric tube terminates in the stomach  - Diet: CLD with TFs  - protonix IV daily   - bowel regimen  - zofran prn     Renal/Genitourinary:   - monitor I&Os  - monitor electrolytes, replete prn  - free water flushes via NG, 250 q6     Hematologic:   - monitor H/H on daily CBC  - DVT ppx: Lovenox     Infectious Disease:   - s/p Unasyn (8/30-8/31)  - monitor WBC and trend fever curve     Endocrine:   - continue home levothyroxine   - monitor glucose on BMP (goal 140)    Disposition: listed   61 y/o female with PMHx HLD, hypothyroidism, obesity, found to have malignant neoplasm of tongue. Patient is now s/p R hemiglossectomy, R neck dissection with radial freeflap and tracheostomy on 8/30. SICU consulted fo flap checks.     PLAN:   Neurologic:   - pain control prn- PO tylenol, oxy, gabapentin  - A&OX4, no issues   - q4 flap checks    Respiratory: trach   - trach and trach collar  - maintain SPO2 > 92%    Cardiovascular:   - MAP > 65  - continue home statin   - hydral 10q6hr prn SBP >140    Gastrointestinal/Nutrition:   - CXR (8/31): Enteric tube terminates in the stomach  - Diet: CLD with TFs  - protonix IV daily   - bowel regimen  - zofran prn     Renal/Genitourinary:   - monitor I&Os  - monitor electrolytes, replete prn  - free water flushes via NG, 250 q6     Hematologic:   - monitor H/H on daily CBC  - DVT ppx: Lovenox     Infectious Disease:   - Unasyn restarted 2/2 incisional erythema  - monitor WBC and trend fever curve     Endocrine:   - continue home levothyroxine   - monitor glucose on BMP (goal 140)    Disposition: listed

## 2023-09-03 NOTE — PROGRESS NOTE ADULT - SUBJECTIVE AND OBJECTIVE BOX
24 HOUR EVENTS:  - free water flushes 250cc q6hr  - hydral 10q6hr prn SBP >140  - q4hr flap checks.   - listed    SUBJECTIVE/ROS:  no new complaints    ICU Vital Signs Last 24 Hrs  T(C): 36.4 (02 Sep 2023 20:00), Max: 36.8 (02 Sep 2023 08:00)  T(F): 97.6 (02 Sep 2023 20:00), Max: 98.3 (02 Sep 2023 08:00)  HR: 72 (02 Sep 2023 20:00) (65 - 96)  BP: 129/73 (02 Sep 2023 20:00) (110/64 - 151/90)  BP(mean): 90 (02 Sep 2023 20:00) (79 - 108)  ABP: --  ABP(mean): --  RR: 17 (02 Sep 2023 20:00) (16 - 19)  SpO2: 95% (02 Sep 2023 20:00) (95% - 98%)    O2 Parameters below as of 02 Sep 2023 20:00  Patient On (Oxygen Delivery Method): room air    I&O's Detail    01 Sep 2023 07:01  -  02 Sep 2023 07:00  --------------------------------------------------------  IN:    Free Water: 1750 mL    Pivot 1.5: 1120 mL  Total IN: 2870 mL    OUT:    Bulb (mL): 55 mL    VAC (Vacuum Assisted Closure) System (mL): 0 mL    Voided (mL): 1750 mL  Total OUT: 1805 mL    Total NET: 1065 mL      02 Sep 2023 07:01  -  03 Sep 2023 01:04  --------------------------------------------------------  IN:    Free Water: 500 mL    Pivot 1.5: 480 mL  Total IN: 980 mL    OUT:    Bulb (mL): 60 mL    VAC (Vacuum Assisted Closure) System (mL): 0 mL    Voided (mL): 150 mL  Total OUT: 210 mL    Total NET: 770 mL    Exam  General: NAD  Neuro: AOx4, NAD, no focal deficits  Exam: Trach in place, CTA b/l. No murmurs, rubs, gallops appreciated.   Cardiac Rhythm: NSR  GI: Soft, non-distended, non-tender.      24 HOUR EVENTS:  - Full liquid diet   - listed    SUBJECTIVE/ROS:  no new complaints    ICU Vital Signs Last 24 Hrs  T(C): 36.4 (02 Sep 2023 20:00), Max: 36.8 (02 Sep 2023 08:00)  T(F): 97.6 (02 Sep 2023 20:00), Max: 98.3 (02 Sep 2023 08:00)  HR: 72 (02 Sep 2023 20:00) (65 - 96)  BP: 129/73 (02 Sep 2023 20:00) (110/64 - 151/90)  BP(mean): 90 (02 Sep 2023 20:00) (79 - 108)  ABP: --  ABP(mean): --  RR: 17 (02 Sep 2023 20:00) (16 - 19)  SpO2: 95% (02 Sep 2023 20:00) (95% - 98%)    O2 Parameters below as of 02 Sep 2023 20:00  Patient On (Oxygen Delivery Method): room air    I&O's Detail    01 Sep 2023 07:01  -  02 Sep 2023 07:00  --------------------------------------------------------  IN:    Free Water: 1750 mL    Pivot 1.5: 1120 mL  Total IN: 2870 mL    OUT:    Bulb (mL): 55 mL    VAC (Vacuum Assisted Closure) System (mL): 0 mL    Voided (mL): 1750 mL  Total OUT: 1805 mL    Total NET: 1065 mL      02 Sep 2023 07:01  -  03 Sep 2023 01:04  --------------------------------------------------------  IN:    Free Water: 500 mL    Pivot 1.5: 480 mL  Total IN: 980 mL    OUT:    Bulb (mL): 60 mL    VAC (Vacuum Assisted Closure) System (mL): 0 mL    Voided (mL): 150 mL  Total OUT: 210 mL    Total NET: 770 mL    Exam  General: NAD  Neuro: AOx4, NAD, no focal deficits  Exam: Trach in place, CTA b/l. No murmurs, rubs, gallops appreciated.   Cardiac Rhythm: NSR  GI: Soft, non-distended, non-tender.

## 2023-09-03 NOTE — PROGRESS NOTE ADULT - ATTENDING COMMENTS
I agree with the history, physical, and plan, which I have reviewed and edited where appropriate.  I agree with notes/assessment of health care providers on my service.  I have personally examined the patient.  I was physically present for the key portions of the evaluation and management (E/M) service provided.  I reviewed data and laboratory tests/x-rays and all pertinent electronic images.  The patient is a critical care patient with life threatening hemodynamic and metabolic instability in SICU.  Risk benefit analyses discussed.    The patient is in SICU with diagnosis mentioned in the note.    The plan is specified below.    63 y/o female with PMHx HLD, hypothyroidism, obesity, found to have malignant neoplasm of tongue. Patient is now s/p R hemiglossectomy, R neck dissection with radial freeflap and tracheostomy on 8/30. SICU consulted for flap checks. Pt stable for transfer to floor.     PLAN:   Neurologic: postoperative pain  - pain control prn- PO tylenol, oxy, gabapentin  - q4hr flap checks.     Respiratory: s/p trach   -  trach collar    Cardiovascular: hypertensive   - continue home statin  - hydral 10q6hr prn SBP >140    Gastrointestinal/Nutrition: at risk for malnutrition  - Diet: NPO with TFs  - protonix IV daily   - bowel regimen    Renal/Genitourinary: hypernatremia improved  - voiding  - free water flushes 250cc q6hr    Hematologic:   - monitor H/H on daily CBC  - DVT ppx: Lovenox     Infectious Disease:   - observe off abx     Endocrine: hypothyroid  - continue home levothyroxine   - monitor glucose on BMP (goal 140) .
I agree with the history, physical, and plan, which I have reviewed and edited where appropriate.  I agree with notes/assessment of health care providers on my service.  I have personally examined the patient.  I was physically present for the key portions of the evaluation and management (E/M) service provided.  I reviewed data and laboratory tests/x-rays and all pertinent electronic images.  The patient is a critical care patient with life threatening hemodynamic and metabolic instability in SICU.  Risk benefit analyses discussed.    The patient is in SICU with diagnosis mentioned in the note.    The plan is specified below.    61 y/o female with PMHx HLD, hypothyroidism, obesity, found to have malignant neoplasm of tongue. Patient is now s/p R hemiglossectomy, R neck dissection with radial freeflap and tracheostomy on 8/30. SICU consulted for flap checks. Pt stable for transfer to floor.     PLAN:   Neurologic: postoperative pain  - pain control prn- PO tylenol, oxy, gabapentin  - q4hr flap checks.     Respiratory: s/p trach   -  trach collar    Cardiovascular: hypertensive   - continue home statin  - hydral 10q6hr prn SBP >140    Gastrointestinal/Nutrition: at risk for malnutrition  - Diet: full liquid diet with TFs  - protonix IV daily   - bowel regimen    Renal/Genitourinary: hypernatremia improved  - voiding  - continue free water flushes 250cc q6hr    Hematologic:   - monitor H/H on daily CBC  - DVT ppx: Lovenox     Infectious Disease:   - resume unasyn for erythema at incision    Endocrine: hypothyroid  - continue home levothyroxine   - monitor glucose on BMP (goal 140) .
The patient was seen and examined, chart and notes reviewed.  The current diagnosis, plan of care and alternatives have been discussed with the patient.  All questions have been answered and updates have been discussed.  The case was discussed with B team residents/PA's and medical students at morning B surgical rounds and throughout the course of the day.    Respiratory abnormality  a.  On tracheal collar  b,  With adequate oxygenation  c.  Obtain ABG  d.  Suction prn    Hypertension  a.  Given labetalol overnight    Hypokalemia  a.  Replete K    At risk for malnutrition  a,  NPO  b,  Start TF as tolerated
I agree with the history, physical, and plan, which I have reviewed and edited where appropriate.  I agree with notes/assessment of health care providers on my service.  I have personally examined the patient.  I was physically present for the key portions of the evaluation and management (E/M) service provided.  I reviewed data and laboratory tests/x-rays and all pertinent electronic images.  The patient is a critical care patient with life threatening hemodynamic and metabolic instability in SICU.  Risk benefit analyses discussed.    The patient is in SICU with diagnosis mentioned in the note.    The plan is specified below.    61 y/o female with PMHx HLD, hypothyroidism, obesity, found to have malignant neoplasm of tongue. Patient is now s/p R hemiglossectomy, R neck dissection with radial freeflap and tracheostomy on 8/30. SICU consulted fo flap checks.     PLAN:   Neurologic: postoperative pain  - pain control prn- PO tylenol, oxy, gabapentin  - q2hr flap checks. Start q4 flap checks 9/1    Respiratory: s/p trach   -  trach collar    Cardiovascular: hypertensive   - continue home statin  - hydral 10q6hr prn SBP >140    Gastrointestinal/Nutrition: at risk for malnutrition  - Diet: NPO with TFs  - protonix IV daily   - bowel regimen    Renal/Genitourinary: hypernatremia, 2L free water deficit   - voiding  - free water flushes 250cc q6hr    Hematologic:   - monitor H/H on daily CBC  - DVT ppx: Lovenox     Infectious Disease:   - observe off abx     Endocrine: hypothyroid  - continue home levothyroxine   - monitor glucose on BMP (goal 140)

## 2023-09-04 LAB
ANION GAP SERPL CALC-SCNC: 11 MMOL/L — SIGNIFICANT CHANGE UP (ref 7–14)
BUN SERPL-MCNC: 17 MG/DL — SIGNIFICANT CHANGE UP (ref 7–23)
CALCIUM SERPL-MCNC: 9.1 MG/DL — SIGNIFICANT CHANGE UP (ref 8.4–10.5)
CHLORIDE SERPL-SCNC: 110 MMOL/L — HIGH (ref 98–107)
CO2 SERPL-SCNC: 23 MMOL/L — SIGNIFICANT CHANGE UP (ref 22–31)
CREAT SERPL-MCNC: 0.67 MG/DL — SIGNIFICANT CHANGE UP (ref 0.5–1.3)
EGFR: 99 ML/MIN/1.73M2 — SIGNIFICANT CHANGE UP
GLUCOSE SERPL-MCNC: 117 MG/DL — HIGH (ref 70–99)
HCT VFR BLD CALC: 35.8 % — SIGNIFICANT CHANGE UP (ref 34.5–45)
HGB BLD-MCNC: 11 G/DL — LOW (ref 11.5–15.5)
MAGNESIUM SERPL-MCNC: 2.4 MG/DL — SIGNIFICANT CHANGE UP (ref 1.6–2.6)
MCHC RBC-ENTMCNC: 30.3 PG — SIGNIFICANT CHANGE UP (ref 27–34)
MCHC RBC-ENTMCNC: 30.7 GM/DL — LOW (ref 32–36)
MCV RBC AUTO: 98.6 FL — SIGNIFICANT CHANGE UP (ref 80–100)
NRBC # BLD: 0 /100 WBCS — SIGNIFICANT CHANGE UP (ref 0–0)
NRBC # FLD: 0 K/UL — SIGNIFICANT CHANGE UP (ref 0–0)
PHOSPHATE SERPL-MCNC: 3.1 MG/DL — SIGNIFICANT CHANGE UP (ref 2.5–4.5)
PLATELET # BLD AUTO: 237 K/UL — SIGNIFICANT CHANGE UP (ref 150–400)
POTASSIUM SERPL-MCNC: 4 MMOL/L — SIGNIFICANT CHANGE UP (ref 3.5–5.3)
POTASSIUM SERPL-SCNC: 4 MMOL/L — SIGNIFICANT CHANGE UP (ref 3.5–5.3)
RBC # BLD: 3.63 M/UL — LOW (ref 3.8–5.2)
RBC # FLD: 13.7 % — SIGNIFICANT CHANGE UP (ref 10.3–14.5)
SODIUM SERPL-SCNC: 144 MMOL/L — SIGNIFICANT CHANGE UP (ref 135–145)
WBC # BLD: 5.37 K/UL — SIGNIFICANT CHANGE UP (ref 3.8–10.5)
WBC # FLD AUTO: 5.37 K/UL — SIGNIFICANT CHANGE UP (ref 3.8–10.5)

## 2023-09-04 RX ORDER — GABAPENTIN 400 MG/1
200 CAPSULE ORAL THREE TIMES A DAY
Refills: 0 | Status: DISCONTINUED | OUTPATIENT
Start: 2023-09-04 | End: 2023-09-06

## 2023-09-04 RX ORDER — AMPICILLIN SODIUM AND SULBACTAM SODIUM 250; 125 MG/ML; MG/ML
3 INJECTION, POWDER, FOR SUSPENSION INTRAMUSCULAR; INTRAVENOUS EVERY 6 HOURS
Refills: 0 | Status: DISCONTINUED | OUTPATIENT
Start: 2023-09-04 | End: 2023-09-06

## 2023-09-04 RX ORDER — ATORVASTATIN CALCIUM 80 MG/1
10 TABLET, FILM COATED ORAL AT BEDTIME
Refills: 0 | Status: DISCONTINUED | OUTPATIENT
Start: 2023-09-04 | End: 2023-09-06

## 2023-09-04 RX ADMIN — AMPICILLIN SODIUM AND SULBACTAM SODIUM 200 GRAM(S): 250; 125 INJECTION, POWDER, FOR SUSPENSION INTRAMUSCULAR; INTRAVENOUS at 23:10

## 2023-09-04 RX ADMIN — PANTOPRAZOLE SODIUM 40 MILLIGRAM(S): 20 TABLET, DELAYED RELEASE ORAL at 11:31

## 2023-09-04 RX ADMIN — Medication 1000 MILLIGRAM(S): at 11:31

## 2023-09-04 RX ADMIN — Medication 1000 MILLIGRAM(S): at 05:50

## 2023-09-04 RX ADMIN — Medication 60 MICROGRAM(S): at 22:11

## 2023-09-04 RX ADMIN — GABAPENTIN 200 MILLIGRAM(S): 400 CAPSULE ORAL at 14:04

## 2023-09-04 RX ADMIN — Medication 1000 MILLIGRAM(S): at 17:46

## 2023-09-04 RX ADMIN — AMPICILLIN SODIUM AND SULBACTAM SODIUM 200 GRAM(S): 250; 125 INJECTION, POWDER, FOR SUSPENSION INTRAMUSCULAR; INTRAVENOUS at 05:21

## 2023-09-04 RX ADMIN — Medication 1000 MILLIGRAM(S): at 23:08

## 2023-09-04 RX ADMIN — Medication 1000 MILLIGRAM(S): at 05:20

## 2023-09-04 RX ADMIN — Medication 1000 MILLIGRAM(S): at 17:16

## 2023-09-04 RX ADMIN — GABAPENTIN 200 MILLIGRAM(S): 400 CAPSULE ORAL at 05:21

## 2023-09-04 RX ADMIN — AMPICILLIN SODIUM AND SULBACTAM SODIUM 200 GRAM(S): 250; 125 INJECTION, POWDER, FOR SUSPENSION INTRAMUSCULAR; INTRAVENOUS at 17:16

## 2023-09-04 RX ADMIN — CHLORHEXIDINE GLUCONATE 15 MILLILITER(S): 213 SOLUTION TOPICAL at 05:21

## 2023-09-04 RX ADMIN — ENOXAPARIN SODIUM 40 MILLIGRAM(S): 100 INJECTION SUBCUTANEOUS at 17:13

## 2023-09-04 RX ADMIN — GABAPENTIN 200 MILLIGRAM(S): 400 CAPSULE ORAL at 22:11

## 2023-09-04 RX ADMIN — ATORVASTATIN CALCIUM 10 MILLIGRAM(S): 80 TABLET, FILM COATED ORAL at 22:11

## 2023-09-04 RX ADMIN — Medication 1000 MILLIGRAM(S): at 23:38

## 2023-09-04 RX ADMIN — Medication 1000 MILLIGRAM(S): at 12:01

## 2023-09-04 RX ADMIN — AMPICILLIN SODIUM AND SULBACTAM SODIUM 200 GRAM(S): 250; 125 INJECTION, POWDER, FOR SUSPENSION INTRAMUSCULAR; INTRAVENOUS at 11:32

## 2023-09-04 RX ADMIN — CHLORHEXIDINE GLUCONATE 15 MILLILITER(S): 213 SOLUTION TOPICAL at 17:13

## 2023-09-04 NOTE — PROGRESS NOTE ADULT - SUBJECTIVE AND OBJECTIVE BOX
OTOLARYNGOLOGY (ENT) PROGRESS NOTE    PATIENT: GIOVANNY CAMP  MRN: [id number]  : [birth year]  [id number]  DATE OF SERVICE:  23      Subjective/ Interval: Patient seen and examined at bedside. LEONELVSS, RODRÍGUEZ. She continued to tolerate capping. She is tolerating FLD.     ALLERGIES:  No Known Allergies      MEDICATIONS:  Antiinfectives:     IV fluids:    Hematologic/Anticoagulation:  enoxaparin Injectable 40 milliGRAM(s) SubCutaneous every 24 hours    Pain medications/Neuro:  acetaminophen   Oral Liquid .. 1000 milliGRAM(s) Oral every 6 hours  gabapentin Solution 200 milliGRAM(s) Oral three times a day  ondansetron Injectable 4 milliGRAM(s) IV Push every 6 hours PRN  oxyCODONE    Solution 5 milliGRAM(s) Oral every 4 hours PRN  oxyCODONE    Solution 10 milliGRAM(s) Oral every 4 hours PRN    Endocrine Medications:   atorvastatin 10 milliGRAM(s) Oral at bedtime  levothyroxine Injectable 60 MICROGram(s) IV Push at bedtime    All other standing medications:   chlorhexidine 0.12% Liquid 15 milliLiter(s) Swish and Spit two times a day  influenza   Vaccine 0.5 milliLiter(s) IntraMuscular once  pantoprazole  Injectable 40 milliGRAM(s) IV Push daily  polyethylene glycol 3350 17 Gram(s) Oral daily  senna 2 Tablet(s) Oral at bedtime    All other PRN medications:  oxymetazoline 0.05% Nasal Spray 2 Spray(s) Both Nostrils two times a day PRN    Vital Signs Last 24 Hrs  T(C): 36.6 (04 Sep 2023 05:00), Max: 37 (03 Sep 2023 19:20)  T(F): 97.8 (04 Sep 2023 05:00), Max: 98.6 (03 Sep 2023 19:20)  HR: 67 (04 Sep 2023 05:00) (67 - 78)  BP: 143/90 (04 Sep 2023 05:00) (139/80 - 155/83)  BP(mean): 103 (03 Sep 2023 16:00) (97 - 103)  RR: 17 (04 Sep 2023 05:00) (13 - 21)  SpO2: 97% (04 Sep 2023 05:00) (94% - 100%)    Parameters below as of 04 Sep 2023 05:00  Patient On (Oxygen Delivery Method): room air           @ 07:01  -   @ 07:00  --------------------------------------------------------  IN:    Free Water: 800 mL    IV PiggyBack: 400 mL    Pivot 1.5: 960 mL  Total IN: 2160 mL    OUT:    Bulb (mL): 45 mL    Oral Fluid: 0 mL    VAC (Vacuum Assisted Closure) System (mL): 0 mL    Voided (mL): 700 mL  Total OUT: 745 mL    Total NET: 1415 mL          23 @ 07:01  -  23 @ 07:00  --------------------------------------------------------  IN:  Total IN: 0 mL    OUT:    Bulb (mL): 45 mL    VAC (Vacuum Assisted Closure) System (mL): 0 mL  Total OUT: 45 mL    Total NET: -45 mL            PHYSICAL EXAM:  NAD, resting comfortably in bed   NGT sutured into R nares   6UN75R in place w/ cap on - removed on rounds. Stoma intact, dressing placed  Improved secretions, no active bleeding  Neck soft, flat, no hematoma or crepitus noted  On trach collar  Neck flat and supple, no collection. incision c/d/i, staples removed.  Cook doppler with strong arterial signal. Intraoral skin paddle pink, well perfused. Incisions intact.  OC/OP: no erythema, bleeding, laceration          LABS                       11.0   5.37  )-----------( 237      ( 04 Sep 2023 05:35 )             35.8    09-04    144  |  110<H>  |  17  ----------------------------<  117<H>  4.0   |  23  |  0.67    Ca    9.1      04 Sep 2023 05:35  Phos  3.1     -04  Mg     2.40     09-04           Coagulation Studies-     Urinalysis Basic - ( 04 Sep 2023 05:35 )    Color: x / Appearance: x / SG: x / pH: x  Gluc: 117 mg/dL / Ketone: x  / Bili: x / Urobili: x   Blood: x / Protein: x / Nitrite: x   Leuk Esterase: x / RBC: x / WBC x   Sq Epi: x / Non Sq Epi: x / Bacteria: x      Endocrine Panel-  Calcium: 9.1 mg/dL ( @ 05:35)                MICROBIOLOGY:        RADIOLOGY & ADDITIONAL STUDIES:    GIOVANNY CAMP is a  62yFemale with s/p right tongue resection, SND I-IV neck dissection, tracheostomy placement w/ 6CN75R and radial forearm free flap . CXR confirmed NGT in place. Trach changed to 6UN75R , decannulated .    PLAN:  - ERAS protocol  - Pain meds (Standing tylenol q6 hrs, gabapentin 600mg qd, opioids as needed)  - Flap checks per PRS  - Baci 2x/d to neck incisions  - Lovenox  - OOB/ambulate  - PT/OT  - Continue unasyn  - Peridex swish and spit BID  - Esomeprazole 20 mg BID  - Monitor BP, maintain MAPs ; avoid pressors   - Bowel regimen (senna + miralax)  - tolerating FLD, will consider removing NGT today      Page ENT with any questions/concerns.  Peds Page #[id number]  Adult Page #[phone]    Leonor Mendoza  23 @ 08:35

## 2023-09-04 NOTE — PROGRESS NOTE ADULT - ASSESSMENT
A/P: 62yFemale with PMHx HLD, hypothyroidism, obesity s/p R hemiglossectomy, SND I-IV neck dissection, tracheostomy, and radial forearm free flap 8/30.  Currently SICU for q1 check    >ERAS protocol  - q4 flap checks  - cook doppler  >Vac holding suction  >Appreciate care by SICU  - re start unasyn for neck erythema/induration  - remove cook/vac POD7

## 2023-09-04 NOTE — PROGRESS NOTE ADULT - SUBJECTIVE AND OBJECTIVE BOX
Plastic Surgery Progress Note (pg [facility]: [id number], [facility]: [phone])    SUBJECTIVE  The patient was seen and examined.     OBJECTIVE  ___________________________________________________  VITAL SIGNS / I&O's   Vital Signs Last 24 Hrs  T(C): 37 (04 Sep 2023 01:49), Max: 37 (03 Sep 2023 19:20)  T(F): 98.6 (04 Sep 2023 01:49), Max: 98.6 (03 Sep 2023 19:20)  HR: 67 (04 Sep 2023 01:49) (67 - 82)  BP: 142/79 (04 Sep 2023 01:49) (139/80 - 155/83)  BP(mean): 103 (03 Sep 2023 16:00) (97 - 103)  RR: 18 (04 Sep 2023 01:49) (13 - 21)  SpO2: 97% (04 Sep 2023 01:49) (94% - 100%)    Parameters below as of 04 Sep 2023 01:49  Patient On (Oxygen Delivery Method): room air          02 Sep 2023 07:01  -  03 Sep 2023 07:00  --------------------------------------------------------  IN:    Free Water: 1000 mL    Pivot 1.5: 960 mL  Total IN: 1960 mL    OUT:    Bulb (mL): 85 mL    VAC (Vacuum Assisted Closure) System (mL): 0 mL    Voided (mL): 450 mL  Total OUT: 535 mL    Total NET: 1425 mL      03 Sep 2023 07:01  -  04 Sep 2023 05:42  --------------------------------------------------------  IN:    Free Water: 550 mL    IV PiggyBack: 300 mL    Pivot 1.5: 720 mL  Total IN: 1570 mL    OUT:    Bulb (mL): 40 mL    Oral Fluid: 0 mL    VAC (Vacuum Assisted Closure) System (mL): 0 mL    Voided (mL): 300 mL  Total OUT: 340 mL    Total NET: 1230 mL        ___________________________________________________  PHYSICAL EXAM    CONSTITUTIONAL: no apparent distress  NEURO:Flap with strong triphasic cook doppler, flap soft, appropriate cap refill, good color, suture line, neck suture line slightly erythematous R>L side, c/d/i  NECK: Supple, symmetric and without tracheal deviation   RESP: No respiratory distress, no use of accessory muscles  GI: No rebound, no guarding; no palpable masses  SKIN: No rashes or ulcers noted  ___________________________________________________  LABS                        11.1   7.00  )-----------( 223      ( 03 Sep 2023 06:00 )             34.8     03 Sep 2023 06:00    145    |  110    |  15     ----------------------------<  109    3.9     |  22     |  0.70     Ca    9.2        03 Sep 2023 06:00  Phos  3.2       03 Sep 2023 06:00  Mg     2.30      03 Sep 2023 06:00        CAPILLARY BLOOD GLUCOSE            Urinalysis Basic - ( 03 Sep 2023 06:00 )    Color: x / Appearance: x / SG: x / pH: x  Gluc: 109 mg/dL / Ketone: x  / Bili: x / Urobili: x   Blood: x / Protein: x / Nitrite: x   Leuk Esterase: x / RBC: x / WBC x   Sq Epi: x / Non Sq Epi: x / Bacteria: x          ___________________________________________________  MEDICATIONS  (STANDING):  acetaminophen   Oral Liquid .. 1000 milliGRAM(s) Oral every 6 hours  atorvastatin 10 milliGRAM(s) Oral at bedtime  chlorhexidine 0.12% Liquid 15 milliLiter(s) Swish and Spit two times a day  enoxaparin Injectable 40 milliGRAM(s) SubCutaneous every 24 hours  gabapentin Solution 200 milliGRAM(s) Oral three times a day  influenza   Vaccine 0.5 milliLiter(s) IntraMuscular once  levothyroxine Injectable 60 MICROGram(s) IV Push at bedtime  pantoprazole  Injectable 40 milliGRAM(s) IV Push daily  polyethylene glycol 3350 17 Gram(s) Oral daily  senna 2 Tablet(s) Oral at bedtime    MEDICATIONS  (PRN):  ondansetron Injectable 4 milliGRAM(s) IV Push every 6 hours PRN Nausea and/or Vomiting  oxyCODONE    Solution 5 milliGRAM(s) Oral every 4 hours PRN Moderate Pain (4 - 6)  oxyCODONE    Solution 10 milliGRAM(s) Oral every 4 hours PRN Severe Pain (7 - 10)  oxymetazoline 0.05% Nasal Spray 2 Spray(s) Both Nostrils two times a day PRN Nasal Congestion

## 2023-09-05 ENCOUNTER — TRANSCRIPTION ENCOUNTER (OUTPATIENT)
Age: 63
End: 2023-09-05

## 2023-09-05 LAB
ANION GAP SERPL CALC-SCNC: 9 MMOL/L — SIGNIFICANT CHANGE UP (ref 7–14)
BUN SERPL-MCNC: 13 MG/DL — SIGNIFICANT CHANGE UP (ref 7–23)
CALCIUM SERPL-MCNC: 9.1 MG/DL — SIGNIFICANT CHANGE UP (ref 8.4–10.5)
CHLORIDE SERPL-SCNC: 108 MMOL/L — HIGH (ref 98–107)
CO2 SERPL-SCNC: 25 MMOL/L — SIGNIFICANT CHANGE UP (ref 22–31)
CREAT SERPL-MCNC: 0.69 MG/DL — SIGNIFICANT CHANGE UP (ref 0.5–1.3)
EGFR: 98 ML/MIN/1.73M2 — SIGNIFICANT CHANGE UP
GLUCOSE SERPL-MCNC: 129 MG/DL — HIGH (ref 70–99)
HCT VFR BLD CALC: 34.7 % — SIGNIFICANT CHANGE UP (ref 34.5–45)
HGB BLD-MCNC: 10.8 G/DL — LOW (ref 11.5–15.5)
MAGNESIUM SERPL-MCNC: 2.3 MG/DL — SIGNIFICANT CHANGE UP (ref 1.6–2.6)
MCHC RBC-ENTMCNC: 30.8 PG — SIGNIFICANT CHANGE UP (ref 27–34)
MCHC RBC-ENTMCNC: 31.1 GM/DL — LOW (ref 32–36)
MCV RBC AUTO: 98.9 FL — SIGNIFICANT CHANGE UP (ref 80–100)
NRBC # BLD: 0 /100 WBCS — SIGNIFICANT CHANGE UP (ref 0–0)
NRBC # FLD: 0 K/UL — SIGNIFICANT CHANGE UP (ref 0–0)
PHOSPHATE SERPL-MCNC: 2.8 MG/DL — SIGNIFICANT CHANGE UP (ref 2.5–4.5)
PLATELET # BLD AUTO: 243 K/UL — SIGNIFICANT CHANGE UP (ref 150–400)
POTASSIUM SERPL-MCNC: 3.8 MMOL/L — SIGNIFICANT CHANGE UP (ref 3.5–5.3)
POTASSIUM SERPL-SCNC: 3.8 MMOL/L — SIGNIFICANT CHANGE UP (ref 3.5–5.3)
RBC # BLD: 3.51 M/UL — LOW (ref 3.8–5.2)
RBC # FLD: 13.3 % — SIGNIFICANT CHANGE UP (ref 10.3–14.5)
SODIUM SERPL-SCNC: 142 MMOL/L — SIGNIFICANT CHANGE UP (ref 135–145)
WBC # BLD: 5.2 K/UL — SIGNIFICANT CHANGE UP (ref 3.8–10.5)
WBC # FLD AUTO: 5.2 K/UL — SIGNIFICANT CHANGE UP (ref 3.8–10.5)

## 2023-09-05 RX ORDER — SENNA PLUS 8.6 MG/1
2 TABLET ORAL
Qty: 0 | Refills: 0 | DISCHARGE
Start: 2023-09-05

## 2023-09-05 RX ORDER — ACETAMINOPHEN 500 MG
15 TABLET ORAL
Qty: 1500 | Refills: 0
Start: 2023-09-05 | End: 2023-09-29

## 2023-09-05 RX ORDER — POLYETHYLENE GLYCOL 3350 17 G/17G
17 POWDER, FOR SOLUTION ORAL
Qty: 0 | Refills: 0 | DISCHARGE
Start: 2023-09-05

## 2023-09-05 RX ORDER — CHLORHEXIDINE GLUCONATE 213 G/1000ML
15 SOLUTION TOPICAL
Refills: 0 | Status: DISCONTINUED | OUTPATIENT
Start: 2023-09-05 | End: 2023-09-06

## 2023-09-05 RX ORDER — OMEPRAZOLE 10 MG/1
1 CAPSULE, DELAYED RELEASE ORAL
Qty: 30 | Refills: 0
Start: 2023-09-05 | End: 2023-10-04

## 2023-09-05 RX ORDER — OXYCODONE HYDROCHLORIDE 5 MG/1
5 TABLET ORAL EVERY 4 HOURS
Refills: 0 | Status: DISCONTINUED | OUTPATIENT
Start: 2023-09-05 | End: 2023-09-06

## 2023-09-05 RX ORDER — PANTOPRAZOLE SODIUM 20 MG/1
40 TABLET, DELAYED RELEASE ORAL DAILY
Refills: 0 | Status: DISCONTINUED | OUTPATIENT
Start: 2023-09-05 | End: 2023-09-06

## 2023-09-05 RX ORDER — OXYCODONE HYDROCHLORIDE 5 MG/1
10 TABLET ORAL EVERY 4 HOURS
Refills: 0 | Status: DISCONTINUED | OUTPATIENT
Start: 2023-09-05 | End: 2023-09-06

## 2023-09-05 RX ORDER — OXYCODONE HYDROCHLORIDE 5 MG/1
1 TABLET ORAL
Qty: 16 | Refills: 0
Start: 2023-09-05 | End: 2023-09-08

## 2023-09-05 RX ORDER — NALOXONE HYDROCHLORIDE 4 MG/.1ML
4 SPRAY NASAL
Qty: 2 | Refills: 0
Start: 2023-09-05 | End: 2023-09-05

## 2023-09-05 RX ORDER — CHLORHEXIDINE GLUCONATE 213 G/1000ML
15 SOLUTION TOPICAL
Qty: 1 | Refills: 0
Start: 2023-09-05 | End: 2023-09-19

## 2023-09-05 RX ORDER — GABAPENTIN 400 MG/1
4 CAPSULE ORAL
Qty: 144 | Refills: 0
Start: 2023-09-05 | End: 2023-09-16

## 2023-09-05 RX ADMIN — PANTOPRAZOLE SODIUM 40 MILLIGRAM(S): 20 TABLET, DELAYED RELEASE ORAL at 11:21

## 2023-09-05 RX ADMIN — Medication 1000 MILLIGRAM(S): at 11:51

## 2023-09-05 RX ADMIN — Medication 1000 MILLIGRAM(S): at 06:03

## 2023-09-05 RX ADMIN — Medication 1000 MILLIGRAM(S): at 23:52

## 2023-09-05 RX ADMIN — GABAPENTIN 200 MILLIGRAM(S): 400 CAPSULE ORAL at 22:09

## 2023-09-05 RX ADMIN — AMPICILLIN SODIUM AND SULBACTAM SODIUM 200 GRAM(S): 250; 125 INJECTION, POWDER, FOR SUSPENSION INTRAMUSCULAR; INTRAVENOUS at 23:21

## 2023-09-05 RX ADMIN — Medication 1000 MILLIGRAM(S): at 23:22

## 2023-09-05 RX ADMIN — GABAPENTIN 200 MILLIGRAM(S): 400 CAPSULE ORAL at 13:35

## 2023-09-05 RX ADMIN — Medication 1000 MILLIGRAM(S): at 19:08

## 2023-09-05 RX ADMIN — AMPICILLIN SODIUM AND SULBACTAM SODIUM 200 GRAM(S): 250; 125 INJECTION, POWDER, FOR SUSPENSION INTRAMUSCULAR; INTRAVENOUS at 18:39

## 2023-09-05 RX ADMIN — ENOXAPARIN SODIUM 40 MILLIGRAM(S): 100 INJECTION SUBCUTANEOUS at 18:46

## 2023-09-05 RX ADMIN — Medication 1000 MILLIGRAM(S): at 05:33

## 2023-09-05 RX ADMIN — AMPICILLIN SODIUM AND SULBACTAM SODIUM 200 GRAM(S): 250; 125 INJECTION, POWDER, FOR SUSPENSION INTRAMUSCULAR; INTRAVENOUS at 11:21

## 2023-09-05 RX ADMIN — GABAPENTIN 200 MILLIGRAM(S): 400 CAPSULE ORAL at 05:32

## 2023-09-05 RX ADMIN — ATORVASTATIN CALCIUM 10 MILLIGRAM(S): 80 TABLET, FILM COATED ORAL at 22:08

## 2023-09-05 RX ADMIN — Medication 1000 MILLIGRAM(S): at 18:38

## 2023-09-05 RX ADMIN — CHLORHEXIDINE GLUCONATE 15 MILLILITER(S): 213 SOLUTION TOPICAL at 05:33

## 2023-09-05 RX ADMIN — Medication 60 MICROGRAM(S): at 22:08

## 2023-09-05 RX ADMIN — AMPICILLIN SODIUM AND SULBACTAM SODIUM 200 GRAM(S): 250; 125 INJECTION, POWDER, FOR SUSPENSION INTRAMUSCULAR; INTRAVENOUS at 05:33

## 2023-09-05 RX ADMIN — CHLORHEXIDINE GLUCONATE 15 MILLILITER(S): 213 SOLUTION TOPICAL at 18:38

## 2023-09-05 RX ADMIN — Medication 1000 MILLIGRAM(S): at 11:21

## 2023-09-05 NOTE — PROGRESS NOTE ADULT - SUBJECTIVE AND OBJECTIVE BOX
Plastic Surgery Progress Note (pg [facility]: [id number], [facility]: [phone])    SUBJECTIVE  The patient was seen and examined.     OBJECTIVE  ___________________________________________________  VITAL SIGNS / I&O's   Vital Signs Last 24 Hrs  T(C): 36.8 (05 Sep 2023 05:00), Max: 37.2 (04 Sep 2023 09:03)  T(F): 98.3 (05 Sep 2023 05:00), Max: 98.9 (04 Sep 2023 09:03)  HR: 68 (05 Sep 2023 05:00) (62 - 77)  BP: 134/73 (05 Sep 2023 05:00) (134/73 - 160/76)  BP(mean): --  RR: 18 (05 Sep 2023 05:00) (17 - 18)  SpO2: 97% (05 Sep 2023 05:00) (94% - 99%)    Parameters below as of 05 Sep 2023 05:00  Patient On (Oxygen Delivery Method): room air          04 Sep 2023 07:01  -  05 Sep 2023 07:00  --------------------------------------------------------  IN:    Free Water: 250 mL    IV PiggyBack: 300 mL    Oral Fluid: 2070 mL    Pivot 1.5: 240 mL  Total IN: 2860 mL    OUT:    Bulb (mL): 50 mL    VAC (Vacuum Assisted Closure) System (mL): 0 mL  Total OUT: 50 mL    Total NET: 2810 mL        ___________________________________________________  PHYSICAL EXAM    CONSTITUTIONAL: no apparent distress  NEURO:Flap with strong triphasic cook doppler, flap soft, appropriate cap refill, good color, suture line, neck suture line slightly erythematous R>L side, c/d/i  NECK: Supple, symmetric and without tracheal deviation   RESP: No respiratory distress, no use of accessory muscles  GI: No rebound, no guarding; no palpable masses  SKIN: No rashes or ulcers noted    ___________________________________________________  LABS                        10.8   5.20  )-----------( 243      ( 05 Sep 2023 05:55 )             34.7     05 Sep 2023 05:55    142    |  108    |  13     ----------------------------<  129    3.8     |  25     |  0.69     Ca    9.1        05 Sep 2023 05:55  Phos  2.8       05 Sep 2023 05:55  Mg     2.30      05 Sep 2023 05:55        CAPILLARY BLOOD GLUCOSE            Urinalysis Basic - ( 05 Sep 2023 05:55 )    Color: x / Appearance: x / SG: x / pH: x  Gluc: 129 mg/dL / Ketone: x  / Bili: x / Urobili: x   Blood: x / Protein: x / Nitrite: x   Leuk Esterase: x / RBC: x / WBC x   Sq Epi: x / Non Sq Epi: x / Bacteria: x          ___________________________________________________  MEDICATIONS  (STANDING):  acetaminophen   Oral Liquid .. 1000 milliGRAM(s) Oral every 6 hours  ampicillin/sulbactam  IVPB 3 Gram(s) IV Intermittent every 6 hours  atorvastatin 10 milliGRAM(s) Oral at bedtime  chlorhexidine 0.12% Liquid 15 milliLiter(s) Swish and Spit two times a day  enoxaparin Injectable 40 milliGRAM(s) SubCutaneous every 24 hours  gabapentin Solution 200 milliGRAM(s) Oral three times a day  influenza   Vaccine 0.5 milliLiter(s) IntraMuscular once  levothyroxine Injectable 60 MICROGram(s) IV Push at bedtime  pantoprazole  Injectable 40 milliGRAM(s) IV Push daily  polyethylene glycol 3350 17 Gram(s) Oral daily  senna 2 Tablet(s) Oral at bedtime    MEDICATIONS  (PRN):  ondansetron Injectable 4 milliGRAM(s) IV Push every 6 hours PRN Nausea and/or Vomiting  oxyCODONE    Solution 5 milliGRAM(s) Oral every 4 hours PRN Moderate Pain (4 - 6)  oxyCODONE    Solution 10 milliGRAM(s) Oral every 4 hours PRN Severe Pain (7 - 10)  oxymetazoline 0.05% Nasal Spray 2 Spray(s) Both Nostrils two times a day PRN Nasal Congestion

## 2023-09-05 NOTE — DISCHARGE NOTE PROVIDER - HOSPITAL COURSE
62F s/p R hemigloss, R SND w/ L RFFF, tracheostomy 8/30. Pt was transferred from PACU to SICU.   - Pt had PT consulted. Pt had trach 62F s/p R hemigloss, R SND w/ L RFFF, tracheostomy 8/30. Pt was transferred from PACU to SICU.   - Pt had PT consulted.  Trach changed to 6UN75R 9/2, decannulated 9/4. Pt had ISABELL drains that were removed. Pt tolerating diet soft. Pt cleared for d/c 9/6 62 year old female with malignant neoplasm of tongue S/P right  hemiglossectomy, right selective neck dissection, left radioforearm free flap and tracheostomy tube placement on 08/30/2023. Had ISABELL drains that were monitored for output to prevent seroma formation. ISABELL drains were removed. Tracheostomy tube changed to cuffless on 9/2/2023 was capped and decannulated on 9/4/2023. Required NG tube placement to allow wound healing. Advanced to PO liquid diet,  patient is advanced to soft diet, and tolerating well. PT evaluated patient and cleared for discharge home without needs. Plastic surgery cleared for discharge. Patient was cleared for discharge home by Dr. Davenport on 9/6/2023. All prescriptions were sent to a pharmacy that was agreed on with the patient.

## 2023-09-05 NOTE — DISCHARGE NOTE PROVIDER - NSDCCPCAREPLAN_GEN_ALL_CORE_FT
PRINCIPAL DISCHARGE DIAGNOSIS  Diagnosis: Malignant neoplasm of tongue, unspecified  Assessment and Plan of Treatment: follow up outpatient

## 2023-09-05 NOTE — DISCHARGE NOTE PROVIDER - NSDCFUADDINST_GEN_ALL_CORE_FT
Wound Care:   Stoma dressing: x4 gauze folded in quarters with a piece of tape over. This can be changed whenever it becomes soiled    - Education: please place a few fingers over the stoma dressing when speaking to create a seal and allow for increased quality in speech   Wound Care:   Stoma dressing: x4 gauze folded in quarters with a piece of tape over. This can be changed whenever it becomes soiled    - Education: please place a few fingers over the stoma dressing when speaking to create a seal and allow for increased quality in speech      No dressing needs for arm or neck incision. Tegederm over cook doppler to remain in place or replace PRN as needed

## 2023-09-05 NOTE — DISCHARGE NOTE PROVIDER - NSDCMRMEDTOKEN_GEN_ALL_CORE_FT
atorvastatin 10 mg oral tablet: 1 tab(s) orally once a day  levothyroxine 75 mcg (0.075 mg) oral tablet: 1 orally once a day am  vitamin d3 1000 iu 3 times weekly:    acetaminophen 500 mg/15 mL oral liquid: 15 milliliter(s) orally every 6 hours MDD: 4000mg  atorvastatin 10 mg oral tablet: 1 tab(s) orally once a day  gabapentin 250 mg/5 mL oral solution: 4 milliliter(s) orally 3 times a day MDD: 3  levothyroxine 75 mcg (0.075 mg) oral tablet: 1 orally once a day am  naloxone 4 mg/0.1 mL nasal spray: 4 milligram(s) intranasally once a day MDD: 1  omeprazole 40 mg oral delayed release capsule: 1 cap(s) orally once a day MDD: 1  oxyCODONE 5 mg oral tablet: 1 tab(s) orally every 6 hours MDD: 4  Peridex 0.12% mucous membrane liquid: 15 milliliter(s) orally 2 times a day  polyethylene glycol 3350 oral powder for reconstitution: 17 gram(s) orally once a day  senna leaf extract oral tablet: 2 tab(s) orally once a day (at bedtime)  vitamin d3 1000 iu 3 times weekly:    acetaminophen 500 mg/15 mL oral liquid: 15 milliliter(s) orally every 6 hours MDD: 4000mg  amoxicillin-clavulanate 875 mg-125 mg oral tablet: 1 tab(s) orally 2 times a day  atorvastatin 10 mg oral tablet: 1 tab(s) orally once a day  gabapentin 250 mg/5 mL oral solution: 4 milliliter(s) orally 3 times a day MDD: 3  levothyroxine 75 mcg (0.075 mg) oral tablet: 1 orally once a day am  naloxone 4 mg/0.1 mL nasal spray: 4 milligram(s) intranasally once a day MDD: 1  omeprazole 40 mg oral delayed release capsule: 1 cap(s) orally once a day MDD: 1  oxyCODONE 5 mg oral tablet: 1 tab(s) orally every 6 hours MDD: 4  Peridex 0.12% mucous membrane liquid: 15 milliliter(s) orally 2 times a day  polyethylene glycol 3350 oral powder for reconstitution: 17 gram(s) orally once a day  senna leaf extract oral tablet: 2 tab(s) orally once a day (at bedtime)  vitamin d3 1000 iu 3 times weekly:

## 2023-09-05 NOTE — DISCHARGE NOTE PROVIDER - CARE PROVIDER_API CALL
Gage Davenport  Otolaryngology  [address] [place] [id number]  Phone: [phone]  Fax: [phone]  Follow Up Time: 1 week

## 2023-09-05 NOTE — PROGRESS NOTE ADULT - SUBJECTIVE AND OBJECTIVE BOX
ENT Progress Note  Interval:  Patient seen and examined at bedside. No acute events overnight.       PAST MEDICAL & SURGICAL HISTORY:  Hypothyroidism      Hypercholesteremia      Osteoarthritis      H/O diverticulitis of colon      Morbid obesity      Malignant neoplasm of tongue, unspecified      Dyslipidemia      History of right hip replacement  10/2013      H/O squamous cell carcinoma excision  5/2008 left arm      H/O colonoscopy  7/2019      H/O basal cell carcinoma excision        Allergies    No Known Allergies    Intolerances      MEDICATIONS  (STANDING):  acetaminophen   Oral Liquid .. 1000 milliGRAM(s) Oral every 6 hours  ampicillin/sulbactam  IVPB 3 Gram(s) IV Intermittent every 6 hours  atorvastatin 10 milliGRAM(s) Oral at bedtime  chlorhexidine 0.12% Liquid 15 milliLiter(s) Swish and Spit two times a day  enoxaparin Injectable 40 milliGRAM(s) SubCutaneous every 24 hours  gabapentin Solution 200 milliGRAM(s) Oral three times a day  influenza   Vaccine 0.5 milliLiter(s) IntraMuscular once  levothyroxine Injectable 60 MICROGram(s) IV Push at bedtime  pantoprazole  Injectable 40 milliGRAM(s) IV Push daily  polyethylene glycol 3350 17 Gram(s) Oral daily  senna 2 Tablet(s) Oral at bedtime    MEDICATIONS  (PRN):  ondansetron Injectable 4 milliGRAM(s) IV Push every 6 hours PRN Nausea and/or Vomiting  oxyCODONE    Solution 5 milliGRAM(s) Oral every 4 hours PRN Moderate Pain (4 - 6)  oxyCODONE    Solution 10 milliGRAM(s) Oral every 4 hours PRN Severe Pain (7 - 10)  oxymetazoline 0.05% Nasal Spray 2 Spray(s) Both Nostrils two times a day PRN Nasal Congestion        Vital Signs Last 24 Hrs  T(C): 36.8 (05 Sep 2023 05:00), Max: 37.2 (04 Sep 2023 09:03)  T(F): 98.3 (05 Sep 2023 05:00), Max: 98.9 (04 Sep 2023 09:03)  HR: 68 (05 Sep 2023 05:00) (62 - 77)  BP: 134/73 (05 Sep 2023 05:00) (134/73 - 160/76)  BP(mean): --  RR: 18 (05 Sep 2023 05:00) (17 - 18)  SpO2: 97% (05 Sep 2023 05:00) (94% - 99%)    Parameters below as of 05 Sep 2023 05:00  Patient On (Oxygen Delivery Method): room air        Physical Exam:  NAD, resting comfortably in bed    6UN75R in place w/ cap on - removed on rounds. Stoma intact, dressing placed  Improved secretions, no active bleeding  Neck soft, flat, no hematoma or crepitus noted  On trach collar  Neck flat and supple, no collection. incision c/d/i, staples removed.  CyberSponse doppler with strong arterial signal. Intraoral skin paddle pink, well perfused. Incisions intact.  OC/OP: no erythema, bleeding, laceration      09-03-23 @ 07:01  -  09-04-23 @ 07:00  --------------------------------------------------------  IN: 2160 mL / OUT: 745 mL / NET: 1415 mL    09-04-23 @ 07:01  -  09-05-23 @ 06:49  --------------------------------------------------------  IN: 2860 mL / OUT: 50 mL / NET: 2810 mL                              11.0   5.37  )-----------( 237      ( 04 Sep 2023 05:35 )             35.8    09-04    144  |  110<H>  |  17  ----------------------------<  117<H>  4.0   |  23  |  0.67    Ca    9.1      04 Sep 2023 05:35  Phos  3.1     09-04  Mg     2.40     09-04           A/P: 62yFemale with s/p right tongue resection, SND I-IV neck dissection, tracheostomy placement w/ 6CN75R and radial forearm free flap 8/30. CXR confirmed NGT in place. Trach changed to 6UN75R 9/2, decannulated 9/4. Tolerating FLD well.     PLAN:  - ERAS protocol  - Pain meds (Standing tylenol q6 hrs, gabapentin 600mg qd, opioids as needed)  - Flap checks per PRS  - Baci 2x/d to neck incisions  - Lovenox  - OOB/ambulate  - PT/OT  - Continue unasyn  - Peridex swish and spit BID  - Esomeprazole 20 mg BID  - Monitor BP, maintain MAPs ; avoid pressors   - Bowel regimen (senna + miralax)  - will consider advance to soft diet

## 2023-09-06 ENCOUNTER — TRANSCRIPTION ENCOUNTER (OUTPATIENT)
Age: 63
End: 2023-09-06

## 2023-09-06 VITALS
DIASTOLIC BLOOD PRESSURE: 97 MMHG | HEART RATE: 71 BPM | RESPIRATION RATE: 18 BRPM | OXYGEN SATURATION: 99 % | SYSTOLIC BLOOD PRESSURE: 155 MMHG | TEMPERATURE: 99 F

## 2023-09-06 RX ADMIN — GABAPENTIN 200 MILLIGRAM(S): 400 CAPSULE ORAL at 05:20

## 2023-09-06 RX ADMIN — Medication 1000 MILLIGRAM(S): at 05:50

## 2023-09-06 RX ADMIN — AMPICILLIN SODIUM AND SULBACTAM SODIUM 200 GRAM(S): 250; 125 INJECTION, POWDER, FOR SUSPENSION INTRAMUSCULAR; INTRAVENOUS at 05:19

## 2023-09-06 RX ADMIN — CHLORHEXIDINE GLUCONATE 15 MILLILITER(S): 213 SOLUTION TOPICAL at 05:20

## 2023-09-06 RX ADMIN — Medication 1000 MILLIGRAM(S): at 05:20

## 2023-09-06 NOTE — DISCHARGE NOTE NURSING/CASE MANAGEMENT/SOCIAL WORK - NSDCPNINST_GEN_ALL_CORE
Please call surgeon's office if you have a temp greater than 100.5, fevers/chills, persistent nausea/vomiting, pain unrelieved by pain medications and any foul discharge from incisional sites.  Call 911 if you experience any breathing difficulties or problems swallowing.

## 2023-09-06 NOTE — PROGRESS NOTE ADULT - ASSESSMENT
A/P: 62yFemale with PMHx HLD, hypothyroidism, obesity s/p R hemiglossectomy, SND I-IV neck dissection, tracheostomy, and radial forearm free flap 8/30.  Currently SICU for q1 check    >ERAS protocol  - q4 flap checks  - cook doppler cut today  - re start unasyn for neck erythema/induration  - cook doppler removed today, drain out  - VAC removed    Ruperto Guillen PGY3  Plastic Surgery  [id number]# [facility] pager  [phone] [facility] pager  Available on Teams

## 2023-09-06 NOTE — DISCHARGE NOTE NURSING/CASE MANAGEMENT/SOCIAL WORK - PATIENT PORTAL LINK FT
You can access the FollowMyHealth Patient Portal offered by [facility] by registering at the following website: [email or web address]. By joining [email or web address]’s FollowMyHealth portal, you will also be able to view your health information using other applications (apps) compatible with our system.

## 2023-09-06 NOTE — PROGRESS NOTE ADULT - SUBJECTIVE AND OBJECTIVE BOX
ENT Progress Note  Interval:  Patient seen and examined at bedside. NO acute events overnight. Re-evaluated by PT with no demonstrated needs.       PAST MEDICAL & SURGICAL HISTORY:  Hypothyroidism      Hypercholesteremia      Osteoarthritis      H/O diverticulitis of colon      Morbid obesity      Malignant neoplasm of tongue, unspecified      Dyslipidemia      History of right hip replacement  10/2013      H/O squamous cell carcinoma excision  5/2008 left arm      H/O colonoscopy  7/2019      H/O basal cell carcinoma excision        Allergies    No Known Allergies    Intolerances      MEDICATIONS  (STANDING):  acetaminophen   Oral Liquid .. 1000 milliGRAM(s) Oral every 6 hours  ampicillin/sulbactam  IVPB 3 Gram(s) IV Intermittent every 6 hours  atorvastatin 10 milliGRAM(s) Oral at bedtime  chlorhexidine 0.12% Liquid 15 milliLiter(s) Swish and Spit two times a day  enoxaparin Injectable 40 milliGRAM(s) SubCutaneous every 24 hours  gabapentin Solution 200 milliGRAM(s) Oral three times a day  influenza   Vaccine 0.5 milliLiter(s) IntraMuscular once  levothyroxine Injectable 60 MICROGram(s) IV Push at bedtime  pantoprazole  Injectable 40 milliGRAM(s) IV Push daily  polyethylene glycol 3350 17 Gram(s) Oral daily  senna 2 Tablet(s) Oral at bedtime    MEDICATIONS  (PRN):  ondansetron Injectable 4 milliGRAM(s) IV Push every 6 hours PRN Nausea and/or Vomiting  oxyCODONE    Solution 10 milliGRAM(s) Oral every 4 hours PRN Severe Pain (7 - 10)  oxyCODONE    Solution 5 milliGRAM(s) Oral every 4 hours PRN Moderate Pain (4 - 6)  oxymetazoline 0.05% Nasal Spray 2 Spray(s) Both Nostrils two times a day PRN Nasal Congestion        Vital Signs Last 24 Hrs  T(C): 36.7 (06 Sep 2023 01:47), Max: 37.1 (05 Sep 2023 18:00)  T(F): 98 (06 Sep 2023 01:47), Max: 98.8 (05 Sep 2023 22:00)  HR: 63 (06 Sep 2023 01:47) (63 - 73)  BP: 131/78 (06 Sep 2023 01:47) (131/78 - 152/87)  BP(mean): --  RR: 18 (06 Sep 2023 01:47) (18 - 18)  SpO2: 99% (06 Sep 2023 01:47) (94% - 99%)    Parameters below as of 06 Sep 2023 01:47  Patient On (Oxygen Delivery Method): room air        Physical Exam:  NAD, resting comfortably in bed   Stoma intact, dressing placed  Improved secretions, no active bleeding  Neck soft, flat, no hematoma or crepitus noted  On trach collar  Neck flat and supple, no collection. incision c/d/i, staples removed.  Cook doppler with strong arterial signal. Intraoral skin paddle pink, well perfused. Incisions intact.  OC/OP: no erythema, bleeding, laceration      09-04-23 @ 07:01  -  09-05-23 @ 07:00  --------------------------------------------------------  IN: 2860 mL / OUT: 50 mL / NET: 2810 mL    09-05-23 @ 07:01  -  09-06-23 @ 04:29  --------------------------------------------------------  IN: 1500 mL / OUT: 32.5 mL / NET: 1467.5 mL                              10.8   5.20  )-----------( 243      ( 05 Sep 2023 05:55 )             34.7    09-05    142  |  108<H>  |  13  ----------------------------<  129<H>  3.8   |  25  |  0.69    Ca    9.1      05 Sep 2023 05:55  Phos  2.8     09-05  Mg     2.30     09-05           A/P: 62yFemale  with s/p right tongue resection, SND I-IV neck dissection, tracheostomy placement w/ 6CN75R and radial forearm free flap 8/30. CXR confirmed NGT in place. Trach changed to 6UN75R 9/2, decannulated 9/4. Tolerating soft diet well.  - plan for PRS to remove ISABELL, dc carmelita/cook today  - possible dc today

## 2023-09-06 NOTE — PROGRESS NOTE ADULT - PROVIDER SPECIALTY LIST ADULT
ENT
Plastic Surgery
SICU
SICU
ENT
Plastic Surgery
SICU
Plastic Surgery
Plastic Surgery
SICU
Plastic Surgery

## 2023-09-06 NOTE — PROGRESS NOTE ADULT - SUBJECTIVE AND OBJECTIVE BOX
Plastic Surgery    SUBJECTIVE: Pt seen and examined on rounds with team. NAEON.      VITALS  T(C): 36.4 (09-06-23 @ 05:00), Max: 37.1 (09-05-23 @ 18:00)  HR: 66 (09-06-23 @ 05:00) (63 - 73)  BP: 158/66 (09-06-23 @ 05:00) (131/78 - 158/66)  RR: 18 (09-06-23 @ 05:00) (18 - 18)  SpO2: 98% (09-06-23 @ 05:00) (94% - 99%)  CAPILLARY BLOOD GLUCOSE          Is/Os    09-05 @ 07:01  -  09-06 @ 07:00  --------------------------------------------------------  IN:    IV PiggyBack: 400 mL    Oral Fluid: 1440 mL  Total IN: 1840 mL    OUT:    Bulb (mL): 32.5 mL    VAC (Vacuum Assisted Closure) System (mL): 0 mL  Total OUT: 32.5 mL    Total NET: 1807.5 mL      PHYSICAL EXAM    CONSTITUTIONAL: no apparent distress  NEURO:Flap with strong triphasic cook doppler, flap soft, appropriate cap refill, good color, suture line, neck suture line slightly erythematous R>L side, c/d/i  NECK: Supple, symmetric and without tracheal deviation   RESP: No respiratory distress, no use of accessory muscles  GI: No rebound, no guarding; no palpable masses  SKIN: No rashes or ulcers noted        LABS  CBC (09-05 @ 05:55)                              10.8<L>                         5.20    )----------------(  243        --    % Neutrophils, --    % Lymphocytes, ANC: --                                  34.7      BMP (09-05 @ 05:55)             142     |  108<H>  |  13    		Ca++ --      Ca 9.1                ---------------------------------( 129<H>		Mg 2.30               3.8     |  25      |  0.69  			Ph 2.8

## 2023-09-07 PROBLEM — E66.01 MORBID (SEVERE) OBESITY DUE TO EXCESS CALORIES: Chronic | Status: ACTIVE | Noted: 2023-08-22

## 2023-09-07 PROBLEM — E78.5 HYPERLIPIDEMIA, UNSPECIFIED: Chronic | Status: ACTIVE | Noted: 2023-08-22

## 2023-09-07 PROBLEM — C02.9 MALIGNANT NEOPLASM OF TONGUE, UNSPECIFIED: Chronic | Status: ACTIVE | Noted: 2023-08-22

## 2023-09-07 LAB — SURGICAL PATHOLOGY STUDY: SIGNIFICANT CHANGE UP

## 2023-09-11 ENCOUNTER — APPOINTMENT (OUTPATIENT)
Dept: OTOLARYNGOLOGY | Facility: CLINIC | Age: 63
End: 2023-09-11
Payer: COMMERCIAL

## 2023-09-11 VITALS
HEART RATE: 55 BPM | SYSTOLIC BLOOD PRESSURE: 133 MMHG | DIASTOLIC BLOOD PRESSURE: 82 MMHG | RESPIRATION RATE: 16 BRPM | BODY MASS INDEX: 39.86 KG/M2 | WEIGHT: 248 LBS | OXYGEN SATURATION: 98 % | HEIGHT: 66 IN

## 2023-09-11 PROCEDURE — 31575 DIAGNOSTIC LARYNGOSCOPY: CPT | Mod: 58

## 2023-09-11 PROCEDURE — 99024 POSTOP FOLLOW-UP VISIT: CPT

## 2023-09-11 RX ORDER — AMOXICILLIN AND CLAVULANATE POTASSIUM 875; 125 MG/1; MG/1
875-125 TABLET, COATED ORAL
Qty: 4 | Refills: 0 | Status: COMPLETED | COMMUNITY
Start: 2023-09-06

## 2023-09-13 ENCOUNTER — NON-APPOINTMENT (OUTPATIENT)
Age: 63
End: 2023-09-13

## 2023-09-14 ENCOUNTER — APPOINTMENT (OUTPATIENT)
Dept: OTOLARYNGOLOGY | Facility: CLINIC | Age: 63
End: 2023-09-14
Payer: COMMERCIAL

## 2023-09-14 PROCEDURE — 99024 POSTOP FOLLOW-UP VISIT: CPT

## 2023-09-18 ENCOUNTER — APPOINTMENT (OUTPATIENT)
Dept: OTOLARYNGOLOGY | Facility: CLINIC | Age: 63
End: 2023-09-18
Payer: COMMERCIAL

## 2023-09-18 VITALS
HEART RATE: 60 BPM | HEIGHT: 66 IN | DIASTOLIC BLOOD PRESSURE: 83 MMHG | SYSTOLIC BLOOD PRESSURE: 138 MMHG | WEIGHT: 247 LBS | BODY MASS INDEX: 39.7 KG/M2 | TEMPERATURE: 97.3 F

## 2023-09-18 PROCEDURE — 99024 POSTOP FOLLOW-UP VISIT: CPT

## 2023-09-19 ENCOUNTER — APPOINTMENT (OUTPATIENT)
Dept: PLASTIC SURGERY | Facility: CLINIC | Age: 63
End: 2023-09-19
Payer: COMMERCIAL

## 2023-09-19 VITALS
SYSTOLIC BLOOD PRESSURE: 122 MMHG | TEMPERATURE: 98.6 F | DIASTOLIC BLOOD PRESSURE: 81 MMHG | HEART RATE: 67 BPM | HEIGHT: 66 IN | OXYGEN SATURATION: 97 % | WEIGHT: 247 LBS | BODY MASS INDEX: 39.7 KG/M2

## 2023-09-19 PROCEDURE — 99024 POSTOP FOLLOW-UP VISIT: CPT

## 2023-09-20 ENCOUNTER — OUTPATIENT (OUTPATIENT)
Dept: OUTPATIENT SERVICES | Facility: HOSPITAL | Age: 63
LOS: 1 days | Discharge: HOME | End: 2023-09-20
Payer: COMMERCIAL

## 2023-09-20 ENCOUNTER — APPOINTMENT (OUTPATIENT)
Dept: RADIATION ONCOLOGY | Facility: CLINIC | Age: 63
End: 2023-09-20
Payer: COMMERCIAL

## 2023-09-20 VITALS
BODY MASS INDEX: 39.53 KG/M2 | DIASTOLIC BLOOD PRESSURE: 79 MMHG | WEIGHT: 246 LBS | HEIGHT: 66 IN | OXYGEN SATURATION: 96 % | RESPIRATION RATE: 16 BRPM | SYSTOLIC BLOOD PRESSURE: 128 MMHG | HEART RATE: 73 BPM

## 2023-09-20 DIAGNOSIS — Z98.890 OTHER SPECIFIED POSTPROCEDURAL STATES: Chronic | ICD-10-CM

## 2023-09-20 DIAGNOSIS — Z96.641 PRESENCE OF RIGHT ARTIFICIAL HIP JOINT: Chronic | ICD-10-CM

## 2023-09-20 PROCEDURE — 99205 OFFICE O/P NEW HI 60 MIN: CPT | Mod: 25

## 2023-09-21 ENCOUNTER — APPOINTMENT (OUTPATIENT)
Age: 63
End: 2023-09-21
Payer: COMMERCIAL

## 2023-09-21 PROCEDURE — D0140: CPT

## 2023-09-21 PROCEDURE — D0210: CPT

## 2023-09-21 PROCEDURE — D0330 PANORAMIC RADIOGRAPHIC IMAGE: CPT

## 2023-09-27 ENCOUNTER — APPOINTMENT (OUTPATIENT)
Age: 63
End: 2023-09-27

## 2023-10-05 ENCOUNTER — APPOINTMENT (OUTPATIENT)
Dept: OTOLARYNGOLOGY | Facility: CLINIC | Age: 63
End: 2023-10-05
Payer: COMMERCIAL

## 2023-10-05 ENCOUNTER — APPOINTMENT (OUTPATIENT)
Dept: PLASTIC SURGERY | Facility: CLINIC | Age: 63
End: 2023-10-05
Payer: COMMERCIAL

## 2023-10-05 VITALS
DIASTOLIC BLOOD PRESSURE: 89 MMHG | TEMPERATURE: 97.8 F | WEIGHT: 246 LBS | OXYGEN SATURATION: 99 % | BODY MASS INDEX: 39.53 KG/M2 | HEART RATE: 51 BPM | HEIGHT: 66 IN | SYSTOLIC BLOOD PRESSURE: 160 MMHG

## 2023-10-05 PROCEDURE — 92610 EVALUATE SWALLOWING FUNCTION: CPT | Mod: GN

## 2023-10-05 PROCEDURE — 99024 POSTOP FOLLOW-UP VISIT: CPT

## 2023-10-05 PROCEDURE — 77263 THER RADIOLOGY TX PLNG CPLX: CPT

## 2023-10-10 ENCOUNTER — APPOINTMENT (OUTPATIENT)
Age: 63
End: 2023-10-10
Payer: COMMERCIAL

## 2023-10-10 PROCEDURE — D7140: CPT

## 2023-10-10 PROCEDURE — D0140: CPT

## 2023-10-11 ENCOUNTER — APPOINTMENT (OUTPATIENT)
Dept: RADIATION ONCOLOGY | Facility: CLINIC | Age: 63
End: 2023-10-11
Payer: COMMERCIAL

## 2023-10-11 VITALS
RESPIRATION RATE: 16 BRPM | WEIGHT: 249 LBS | HEART RATE: 58 BPM | OXYGEN SATURATION: 97 % | BODY MASS INDEX: 40.19 KG/M2 | SYSTOLIC BLOOD PRESSURE: 160 MMHG | DIASTOLIC BLOOD PRESSURE: 96 MMHG

## 2023-10-11 DIAGNOSIS — C02.3 MALIGNANT NEOPLASM OF ANTERIOR TWO-THIRDS OF TONGUE, PART UNSPECIFIED: ICD-10-CM

## 2023-10-11 PROCEDURE — 99215 OFFICE O/P EST HI 40 MIN: CPT | Mod: 25

## 2023-10-19 PROCEDURE — 77300 RADIATION THERAPY DOSE PLAN: CPT | Mod: 26

## 2023-10-19 PROCEDURE — 77301 RADIOTHERAPY DOSE PLAN IMRT: CPT | Mod: 26

## 2023-10-19 PROCEDURE — 77338 DESIGN MLC DEVICE FOR IMRT: CPT | Mod: 26

## 2023-10-23 ENCOUNTER — APPOINTMENT (OUTPATIENT)
Dept: OTOLARYNGOLOGY | Facility: CLINIC | Age: 63
End: 2023-10-23
Payer: COMMERCIAL

## 2023-10-23 PROCEDURE — 99024 POSTOP FOLLOW-UP VISIT: CPT

## 2023-10-23 PROCEDURE — 31575 DIAGNOSTIC LARYNGOSCOPY: CPT | Mod: 79

## 2023-10-23 RX ORDER — AMOXICILLIN AND CLAVULANATE POTASSIUM 500; 125 MG/1; MG/1
500-125 TABLET, FILM COATED ORAL
Qty: 14 | Refills: 0 | Status: COMPLETED | COMMUNITY
Start: 2023-09-11 | End: 2023-10-02

## 2023-10-26 ENCOUNTER — NON-APPOINTMENT (OUTPATIENT)
Age: 63
End: 2023-10-26

## 2023-10-26 ENCOUNTER — APPOINTMENT (OUTPATIENT)
Dept: OTOLARYNGOLOGY | Facility: CLINIC | Age: 63
End: 2023-10-26
Payer: COMMERCIAL

## 2023-10-26 VITALS
SYSTOLIC BLOOD PRESSURE: 170 MMHG | OXYGEN SATURATION: 98 % | DIASTOLIC BLOOD PRESSURE: 93 MMHG | HEART RATE: 67 BPM | RESPIRATION RATE: 16 BRPM | TEMPERATURE: 97 F | WEIGHT: 242 LBS | BODY MASS INDEX: 38.89 KG/M2 | HEIGHT: 66 IN

## 2023-10-26 PROCEDURE — G6002: CPT | Mod: 26

## 2023-10-26 PROCEDURE — 92526 ORAL FUNCTION THERAPY: CPT | Mod: GN

## 2023-10-27 PROCEDURE — G6002: CPT | Mod: 26

## 2023-10-30 PROCEDURE — G6002: CPT | Mod: 26

## 2023-10-31 ENCOUNTER — NON-APPOINTMENT (OUTPATIENT)
Age: 63
End: 2023-10-31

## 2023-10-31 PROCEDURE — G6002: CPT | Mod: 26

## 2023-11-01 ENCOUNTER — NON-APPOINTMENT (OUTPATIENT)
Age: 63
End: 2023-11-01

## 2023-11-01 PROCEDURE — 77014: CPT | Mod: 26

## 2023-11-01 PROCEDURE — 77427 RADIATION TX MANAGEMENT X5: CPT

## 2023-11-02 ENCOUNTER — NON-APPOINTMENT (OUTPATIENT)
Age: 63
End: 2023-11-02

## 2023-11-02 PROCEDURE — G6002: CPT | Mod: 26

## 2023-11-03 VITALS
SYSTOLIC BLOOD PRESSURE: 155 MMHG | RESPIRATION RATE: 16 BRPM | DIASTOLIC BLOOD PRESSURE: 87 MMHG | BODY MASS INDEX: 39.06 KG/M2 | HEART RATE: 63 BPM | OXYGEN SATURATION: 97 % | WEIGHT: 242 LBS

## 2023-11-03 PROCEDURE — G6002: CPT | Mod: 26

## 2023-11-06 PROCEDURE — G6002: CPT | Mod: 26

## 2023-11-07 ENCOUNTER — NON-APPOINTMENT (OUTPATIENT)
Age: 63
End: 2023-11-07

## 2023-11-07 DIAGNOSIS — K12.30 ORAL MUCOSITIS (ULCERATIVE), UNSPECIFIED: ICD-10-CM

## 2023-11-07 PROCEDURE — G6002: CPT | Mod: 26

## 2023-11-07 RX ORDER — LIDOCAINE HYDROCHLORIDE 40 MG/ML
4 SOLUTION TOPICAL
Qty: 240 | Refills: 6 | Status: ACTIVE | COMMUNITY
Start: 2023-11-07 | End: 1900-01-01

## 2023-11-08 PROCEDURE — 77427 RADIATION TX MANAGEMENT X5: CPT

## 2023-11-08 PROCEDURE — 77014: CPT | Mod: 26

## 2023-11-09 ENCOUNTER — NON-APPOINTMENT (OUTPATIENT)
Age: 63
End: 2023-11-09

## 2023-11-09 VITALS
WEIGHT: 241 LBS | DIASTOLIC BLOOD PRESSURE: 89 MMHG | BODY MASS INDEX: 38.9 KG/M2 | HEART RATE: 75 BPM | OXYGEN SATURATION: 93 % | RESPIRATION RATE: 61 BRPM | SYSTOLIC BLOOD PRESSURE: 168 MMHG

## 2023-11-09 PROCEDURE — G6002: CPT | Mod: 26

## 2023-11-10 ENCOUNTER — APPOINTMENT (OUTPATIENT)
Dept: OTOLARYNGOLOGY | Facility: CLINIC | Age: 63
End: 2023-11-10
Payer: COMMERCIAL

## 2023-11-10 DIAGNOSIS — R13.10 DYSPHAGIA, UNSPECIFIED: ICD-10-CM

## 2023-11-10 PROCEDURE — G6002: CPT | Mod: 26

## 2023-11-10 PROCEDURE — 92526 ORAL FUNCTION THERAPY: CPT | Mod: GN

## 2023-11-10 RX ORDER — CHROMIUM 200 MCG
TABLET ORAL
Refills: 0 | Status: ACTIVE | COMMUNITY

## 2023-11-13 PROCEDURE — G6002: CPT | Mod: 26

## 2023-11-14 ENCOUNTER — APPOINTMENT (OUTPATIENT)
Dept: PHYSICAL MEDICINE AND REHAB | Facility: CLINIC | Age: 63
End: 2023-11-14
Payer: COMMERCIAL

## 2023-11-14 VITALS
BODY MASS INDEX: 38.57 KG/M2 | HEART RATE: 64 BPM | DIASTOLIC BLOOD PRESSURE: 98 MMHG | RESPIRATION RATE: 15 BRPM | HEIGHT: 66 IN | SYSTOLIC BLOOD PRESSURE: 185 MMHG | WEIGHT: 240 LBS

## 2023-11-14 PROCEDURE — G6002: CPT | Mod: 26

## 2023-11-14 PROCEDURE — 99204 OFFICE O/P NEW MOD 45 MIN: CPT

## 2023-11-14 RX ORDER — NALOXONE HYDROCHLORIDE 4 MG/.1ML
4 SPRAY NASAL
Qty: 1 | Refills: 0 | Status: ACTIVE | COMMUNITY
Start: 2023-11-14 | End: 1900-01-01

## 2023-11-15 PROCEDURE — 77427 RADIATION TX MANAGEMENT X5: CPT

## 2023-11-15 PROCEDURE — 77014: CPT | Mod: 26

## 2023-11-16 ENCOUNTER — NON-APPOINTMENT (OUTPATIENT)
Age: 63
End: 2023-11-16

## 2023-11-16 VITALS
HEART RATE: 80 BPM | SYSTOLIC BLOOD PRESSURE: 158 MMHG | WEIGHT: 238 LBS | HEIGHT: 66 IN | OXYGEN SATURATION: 98 % | RESPIRATION RATE: 16 BRPM | BODY MASS INDEX: 38.25 KG/M2 | DIASTOLIC BLOOD PRESSURE: 92 MMHG

## 2023-11-16 PROCEDURE — G6002: CPT | Mod: 26

## 2023-11-17 ENCOUNTER — APPOINTMENT (OUTPATIENT)
Dept: PLASTIC SURGERY | Facility: CLINIC | Age: 63
End: 2023-11-17
Payer: COMMERCIAL

## 2023-11-17 VITALS
HEIGHT: 66 IN | DIASTOLIC BLOOD PRESSURE: 92 MMHG | TEMPERATURE: 96.5 F | BODY MASS INDEX: 38.25 KG/M2 | HEART RATE: 82 BPM | WEIGHT: 238 LBS | OXYGEN SATURATION: 98 % | SYSTOLIC BLOOD PRESSURE: 168 MMHG

## 2023-11-17 PROCEDURE — 99024 POSTOP FOLLOW-UP VISIT: CPT

## 2023-11-17 PROCEDURE — G6002: CPT | Mod: 26

## 2023-11-20 PROCEDURE — G6002: CPT | Mod: 26

## 2023-11-21 ENCOUNTER — NON-APPOINTMENT (OUTPATIENT)
Age: 63
End: 2023-11-21

## 2023-11-21 PROCEDURE — G6002: CPT | Mod: 26

## 2023-11-22 ENCOUNTER — NON-APPOINTMENT (OUTPATIENT)
Age: 63
End: 2023-11-22

## 2023-11-22 VITALS
WEIGHT: 233 LBS | OXYGEN SATURATION: 95 % | BODY MASS INDEX: 37.61 KG/M2 | HEART RATE: 81 BPM | RESPIRATION RATE: 16 BRPM | SYSTOLIC BLOOD PRESSURE: 156 MMHG | DIASTOLIC BLOOD PRESSURE: 89 MMHG

## 2023-11-22 PROCEDURE — 77014: CPT | Mod: 26

## 2023-11-22 PROCEDURE — 77427 RADIATION TX MANAGEMENT X5: CPT

## 2023-11-27 PROCEDURE — G6002: CPT | Mod: 26

## 2023-11-28 ENCOUNTER — APPOINTMENT (OUTPATIENT)
Dept: PHYSICAL MEDICINE AND REHAB | Facility: CLINIC | Age: 63
End: 2023-11-28
Payer: COMMERCIAL

## 2023-11-28 DIAGNOSIS — G89.3 NEOPLASM RELATED PAIN (ACUTE) (CHRONIC): ICD-10-CM

## 2023-11-28 PROCEDURE — 99214 OFFICE O/P EST MOD 30 MIN: CPT

## 2023-11-29 PROCEDURE — 77014: CPT | Mod: 26

## 2023-11-30 ENCOUNTER — OUTPATIENT (OUTPATIENT)
Dept: OUTPATIENT SERVICES | Facility: HOSPITAL | Age: 63
LOS: 1 days | Discharge: HOME | End: 2023-11-30
Payer: COMMERCIAL

## 2023-11-30 ENCOUNTER — NON-APPOINTMENT (OUTPATIENT)
Age: 63
End: 2023-11-30

## 2023-11-30 VITALS
SYSTOLIC BLOOD PRESSURE: 144 MMHG | DIASTOLIC BLOOD PRESSURE: 84 MMHG | WEIGHT: 231 LBS | BODY MASS INDEX: 37.12 KG/M2 | RESPIRATION RATE: 16 BRPM | HEART RATE: 81 BPM | HEIGHT: 66 IN | OXYGEN SATURATION: 97 %

## 2023-11-30 DIAGNOSIS — Z96.641 PRESENCE OF RIGHT ARTIFICIAL HIP JOINT: Chronic | ICD-10-CM

## 2023-11-30 DIAGNOSIS — Z98.890 OTHER SPECIFIED POSTPROCEDURAL STATES: Chronic | ICD-10-CM

## 2023-11-30 PROCEDURE — G6002: CPT | Mod: 26

## 2023-12-01 ENCOUNTER — APPOINTMENT (OUTPATIENT)
Dept: OTOLARYNGOLOGY | Facility: CLINIC | Age: 63
End: 2023-12-01
Payer: COMMERCIAL

## 2023-12-01 PROCEDURE — 92526 ORAL FUNCTION THERAPY: CPT | Mod: GN

## 2023-12-01 PROCEDURE — 77427 RADIATION TX MANAGEMENT X5: CPT

## 2023-12-01 PROCEDURE — G6002: CPT | Mod: 26

## 2023-12-04 PROCEDURE — G6002: CPT | Mod: 26

## 2023-12-05 ENCOUNTER — NON-APPOINTMENT (OUTPATIENT)
Age: 63
End: 2023-12-05

## 2023-12-05 PROCEDURE — G6002: CPT | Mod: 26

## 2023-12-06 PROCEDURE — G6002: CPT | Mod: 26

## 2023-12-07 ENCOUNTER — NON-APPOINTMENT (OUTPATIENT)
Age: 63
End: 2023-12-07

## 2023-12-07 VITALS
HEART RATE: 73 BPM | DIASTOLIC BLOOD PRESSURE: 84 MMHG | BODY MASS INDEX: 36.8 KG/M2 | WEIGHT: 228 LBS | OXYGEN SATURATION: 97 % | RESPIRATION RATE: 16 BRPM | SYSTOLIC BLOOD PRESSURE: 156 MMHG

## 2023-12-07 DIAGNOSIS — C02.3 MALIGNANT NEOPLASM OF ANTERIOR TWO-THIRDS OF TONGUE, PART UNSPECIFIED: ICD-10-CM

## 2023-12-07 PROCEDURE — 77427 RADIATION TX MANAGEMENT X5: CPT

## 2023-12-07 PROCEDURE — G6002: CPT | Mod: 26

## 2023-12-08 PROCEDURE — G6002: CPT | Mod: 26

## 2023-12-11 ENCOUNTER — APPOINTMENT (OUTPATIENT)
Dept: OTOLARYNGOLOGY | Facility: CLINIC | Age: 63
End: 2023-12-11
Payer: COMMERCIAL

## 2023-12-12 ENCOUNTER — APPOINTMENT (OUTPATIENT)
Dept: OTOLARYNGOLOGY | Facility: CLINIC | Age: 63
End: 2023-12-12
Payer: COMMERCIAL

## 2023-12-12 VITALS
WEIGHT: 228 LBS | BODY MASS INDEX: 36.64 KG/M2 | HEART RATE: 92 BPM | SYSTOLIC BLOOD PRESSURE: 125 MMHG | DIASTOLIC BLOOD PRESSURE: 86 MMHG | HEIGHT: 66 IN

## 2023-12-12 PROCEDURE — 99214 OFFICE O/P EST MOD 30 MIN: CPT | Mod: 25

## 2023-12-12 PROCEDURE — 31575 DIAGNOSTIC LARYNGOSCOPY: CPT

## 2023-12-20 NOTE — ASU PATIENT PROFILE, ADULT - NSALCOHOLFREQ_GEN_A_CORE_SD
[facility] - Int[facility] ([place] [address])      HOME HEALTH ORDERS  FACE TO FACE ENCOUNTER    Patient Name: Francois Otero Sr.  YOB: 1944    PCP: Carley Kothari MD   PCP Address: [address] [place] [address]  PCP Phone Number: [phone]  PCP Fax: [phone]    Encounter Date: 12/5/23    Admit to Home Health    Diagnoses:  Active Hospital Problems    Diagnosis  POA    *COPD exacerbation [J44.1]  Yes    Urinary retention [R33.9]  No    Anxiety [F41.9]  Yes    History of benzodiazepine use [Z87.898]  No    Palliative care encounter [Z51.5]  Not Applicable    Abdominal pain [R10.9]  Yes    COPD (chronic obstructive pulmonary disease) [J44.9]  Yes    Hypertension [I10]  Yes      Resolved Hospital Problems   No resolved problems to display.       Follow Up Appointments:  Future Appointments   Date Time Provider Department Center   12/22/2023 10:20 AM Zehra Boone NP [facility] UROLOG [place]   12/27/2023  9:00 AM Regino Garduno NP [facility] [place]   1/10/2024 11:30 AM Jennifer Jerome NP [facility]   2/9/2024 10:45 AM Tevin Gamboa MD [facility] CARDIO [place]       Allergies:  Review of patient's allergies indicates:   Allergen Reactions    Lisinopril Edema    Penicillin g sodium      Other reaction(s): unknown. was told by his mother.    Penicillins     Sulfa (sulfonamide antibiotics)        Medications: Review discharge medications with patient and family and provide education.    Current Facility-Administered Medications   Medication Dose Route Frequency Provider Last Rate Last Admin    acetaminophen tablet 650 mg  650 mg Oral Q6H PRN Raymond Hurt MD        albuterol sulfate nebulizer solution 2.5 mg  2.5 mg Nebulization Q4H PRN Piyush Rizzo MD        ALPRAZolam tablet 0.5 mg  0.5 mg Oral TID PRN Rupinder Kenyon MD   0.5 mg at 12/19/23 2050    benzonatate capsule 100 mg  100 mg Oral TID PRN Daniel Smith NP   100 mg at 12/16/23 2227     bisacodyL suppository 10 mg  10 mg Rectal Daily PRN Raymond Hurt MD        fluticasone furoate-vilanteroL 200-25 mcg/dose diskus inhaler 1 puff  1 puff Inhalation Daily Mahendra Qiu MD   1 puff at 12/20/23 0839    heparin (porcine) injection 5,000 Units  5,000 Units Subcutaneous Q8H Nahid Conley MD   5,000 Units at 12/20/23 0606    hydrALAZINE tablet 25 mg  25 mg Oral Q8H PRN Raymond Hurt MD        ipratropium 0.02 % nebulizer solution 0.5 mg  0.5 mg Nebulization Q6H WAKE Raymond Hurt MD   0.5 mg at 12/20/23 0839    levalbuterol nebulizer solution 1.25 mg  1.25 mg Nebulization Q6H WAKE Raymond Hurt MD   1.25 mg at 12/20/23 0839    melatonin tablet 6 mg  6 mg Oral Nightly PRN Mahendra Qiu MD   6 mg at 12/17/23 2158    NIFEdipine 24 hr tablet 60 mg  60 mg Oral Daily Nahid Conley MD   60 mg at 12/20/23 0821    ondansetron injection 4 mg  4 mg Intravenous Q8H PRN Mahendra Qiu MD        oxyCODONE 5 mg/5 mL solution 2 mg  2 mg Oral Q4H PRN Rupinder Kenyon MD   2 mg at 12/19/23 2114    pantoprazole EC tablet 40 mg  40 mg Oral Daily Nahid Conley MD   40 mg at 12/20/23 0821    polyethylene glycol packet 17 g  17 g Oral Daily Nahid Conley MD   17 g at 12/18/23 0912    simethicone chewable tablet 80 mg  1 tablet Oral TID PRN Raymond Hurt MD   80 mg at 12/12/23 1728    sodium chloride 0.9% flush 10 mL  10 mL Intravenous PRN Mahendra Qiu MD        tamsulosin 24 hr capsule 0.4 mg  0.4 mg Oral Daily Raymond Hurt MD   0.4 mg at 12/20/23 0821     Current Discharge Medication List        START taking these medications       ALPRAZolam 0.5 MG tablet  Commonly known as: XANAX  Take 1 tablet (0.5 mg total) by mouth nightly as needed for Anxiety.      NIFEdipine 60 MG (OSM) 24 hr tablet  Commonly known as: PROCARDIA-XL  Take 1 tablet (60 mg total) by mouth once daily.  Start taking on: December 20, 2023      polyethylene glycol 17 gram Pwpk  Commonly known  as: GLYCOLAX  Take 17 g by mouth once daily.  Start taking on: December 20, 2023      tamsulosin 0.4 mg Cap  Commonly known as: FLOMAX  Take 1 capsule (0.4 mg total) by mouth once daily.  Start taking on: December 20, 2023                CHANGE how you take these medications       butalbital-acetaminophen-caffeine -40 mg -40 mg per tablet  Commonly known as: FIORICET, ESGIC  Take 1 tablet by mouth every 4 (four) hours as needed for Headaches.  What changed: reasons to take this                CONTINUE taking these medications       aspirin 325 MG EC tablet  Commonly known as: ECOTRIN  Take 325 mg by mouth once daily.      fluticasone propionate 50 mcg/actuation nasal spray  Commonly known as: FLONASE  1 spray by Each Nostril route once daily.      ipratropium 0.02 % nebulizer solution  Commonly known as: ATROVENT  Take 2.5 mLs (500 mcg total) by nebulization 3 (three) times daily. INHALE THE CONTENTS OF 1 VIAL VIA NEBULIZER THREE TIMES DAILY      levalbuterol 1.25 mg/3 mL nebulizer solution  Commonly known as: XOPENEX  Take 3 mLs (1.25 mg total) by nebulization 3 (three) times daily.             SYMBICORT 160-4.5 mcg/actuation Hfaa  Generic drug: budesonide-formoterol 160-4.5 mcg  Inhale 2 puffs into the lungs every 12 (twelve) hours.      VENTOLIN HFA 90 mcg/actuation inhaler  Generic drug: albuterol  INHALE 2 PUFFS INTO THE LUNGS EVERY 6 HOURS AS NEEDED FOR WHEEZING OR SHORTNESS OF BREATH         I have seen and examined this patient within the last 30 days. My clinical findings that support the need for the home health skilled services and home bound status are the following:no   Weakness/numbness causing balance and gait disturbance due to COPD Exacerbation and Weakness/Debility making it taxing to leave home.     Diet:   regular diet    Labs:  Report Lab results to PCP.    Referrals/ Consults  Physical Therapy to evaluate and treat. Evaluate for home safety and equipment needs; Establish/upgrade  home exercise program. Perform / instruct on therapeutic exercises, gait training, transfer training, and Range of Motion.  Occupational Therapy to evaluate and treat. Evaluate home environment for safety and equipment needs. Perform/Instruct on transfers, ADL training, ROM, and therapeutic exercises.   to evaluate for community resources/long-range planning.    Activities:   activity as tolerated    Nursing:   Agency to admit patient within 24 hours of hospital discharge unless specified on physician order or at patient request    SN to complete comprehensive assessment including routine vital signs. Instruct on disease process and s/s of complications to report to MD. Review/verify medication list sent home with the patient at time of discharge  and instruct patient/caregiver as needed. Frequency may be adjusted depending on start of care date.     Skilled nurse to perform up to 3 visits PRN for symptoms related to diagnosis    Notify MD if SBP > 160 or < 90; DBP > 90 or < 50; HR > 120 or < 50; Temp > 101; O2 < 88%    Ok to schedule additional visits based on staff availability and patient request on consecutive days within the home health episode.    When multiple disciplines ordered:    Start of Care occurs on Sunday - Wednesday schedule remaining discipline evaluations as ordered on separate consecutive days following the start of care.    Thursday SOC -schedule subsequent evaluations Friday and Monday the following week.     Friday - Saturday SOC - schedule subsequent discipline evaluations on consecutive days starting Monday of the following week.    For all post-discharge communication and subsequent orders please contact patient's primary care physician.     Miscellaneous   Home Oxygen:  No change    Home Health Aide:  Nursing Three times weekly, Physical Therapy Three times weekly, Occupational Therapy Three times weekly, and Medical Social Work Three times weekly    Wound Care Orders  no    I  certify that this patient is confined to his home and needs intermittent skilled nursing care, physical therapy, and occupational therapy.           2-4 times/mo

## 2023-12-21 ENCOUNTER — APPOINTMENT (OUTPATIENT)
Dept: PHYSICAL MEDICINE AND REHAB | Facility: CLINIC | Age: 63
End: 2023-12-21
Payer: COMMERCIAL

## 2023-12-21 VITALS
WEIGHT: 228 LBS | HEIGHT: 66 IN | DIASTOLIC BLOOD PRESSURE: 80 MMHG | BODY MASS INDEX: 36.64 KG/M2 | SYSTOLIC BLOOD PRESSURE: 124 MMHG | RESPIRATION RATE: 12 BRPM | HEART RATE: 62 BPM

## 2023-12-21 PROCEDURE — 99214 OFFICE O/P EST MOD 30 MIN: CPT

## 2023-12-21 NOTE — PHYSICAL EXAM
[FreeTextEntry1] : Gen: Patient is A&O x 3, NAD HEENT: EOMI, hearing grossly normal, Tongue ulcerations  Resp: regular, non - labored CV: pulses regular Skin: no rashes, erythema, no increased warmth  Lymph: no clubbing, cyanosis, edema Inspection: Mild scapula wining  ROM: full throughout Palpation: TTP right periscapular muscles  Sensation: intact to light touch Reflexes: 1+ and symmetric throughout Strength: 5/5 throughout, except 4+/5 in Right elbow extension Special tests: -Stanford sign, -Hoffmans sign, no clonus, -Jamaal, +Spurling sign  Gait: normal, non-antalgic

## 2023-12-21 NOTE — HISTORY OF PRESENT ILLNESS
[FreeTextEntry1] : Ms. 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  She reports overall pain is improving.  Still feels pain from tongue ulcerations.  Reports that her arm pain is doing better but still does feel some shooting pain down it.  Weakness is improving.  Has some swelling in her submental region.  Taking gabapentin this has been helpful no side effects.  Occasionally taking oxycodone.

## 2023-12-21 NOTE — ASSESSMENT
[FreeTextEntry1] : 62 year old female presenting for evaluation.  #Cancer associated pain: -Continue oxycodone 5 mg every 6 hours for breakthrough pain -Narcan sent and education provided  #Neuropathic pain -She appears to be having radicular symptoms rating down her right lateral arm -Overall pain and strength improving -Clinically appears more likely C7 radiculopathy -Discussed  MRI, she would like to defer -Obtain EMG RUE to evaluate for radiculopathy vs plexopathy -Obtain cervical spine x-ray  -Continue gabapentin 600 mg 3 times daily-rx sent   #Scapular winging -Has a component of myofascial pain associated with this -Start OT   #Lymphedema: -Educated about Lymphedema -Start MLD   #Dysphagia -SLP  Follow up in 3-4 weeks.

## 2023-12-22 ENCOUNTER — APPOINTMENT (OUTPATIENT)
Dept: PLASTIC SURGERY | Facility: CLINIC | Age: 63
End: 2023-12-22
Payer: COMMERCIAL

## 2023-12-22 VITALS
HEIGHT: 66 IN | DIASTOLIC BLOOD PRESSURE: 81 MMHG | SYSTOLIC BLOOD PRESSURE: 121 MMHG | HEART RATE: 61 BPM | TEMPERATURE: 97.7 F | OXYGEN SATURATION: 99 % | BODY MASS INDEX: 36.64 KG/M2 | WEIGHT: 228 LBS

## 2023-12-22 PROCEDURE — 99212 OFFICE O/P EST SF 10 MIN: CPT

## 2023-12-27 NOTE — HISTORY OF PRESENT ILLNESS
[de-identified] : Pt is refer by gómez Rodriguez for tongue lesion. Ct of neck - 1.7 cm right postero-lateral tongue lesion with mild adjacent right level 2A lymphadenopathy. Bx right posterior tongue lesion: Fragments of squamous cell carcinoma. CT of chest on 8/11/2023, pet ct 8/17/2023. Pt is here S/P right hemiglossectomy, right selective neck dissection, left radioforearm free flap and tracheostomy tube placement on 08/30/2023 and path Squamous cell carcinoma, well differentiated, Resection margins are negative for carcinoma. Pt completed RT with Dr. Garcia on 12/8/2023 and pt is continue to f/u with Dr. Jenkins.     Pt is  here to f/u and some swelling on the right tongue, no pain, on regular diet,  Trach site is healed, and c.o dry irritated coughing after the trach placed.   former smoker social etoh.

## 2023-12-27 NOTE — PROCEDURE
[Dysphagia] : dysphagia not clearly evaluated by indirect laryngoscopy [None] : none [Flexible Endoscope] : examined with the flexible endoscope [Serial Number: ___] : Serial Number: [unfilled] [de-identified] : Flap is healing well.  No lesions in the NPx, OPx, HPx or larynx.  VC are mobile, airway patent.  Moderate mucositis.

## 2023-12-27 NOTE — PROCEDURE
[de-identified] : No lesions in the NPx, OPx, HPx or larynx.  Expected posttreatment changes, VC are mobile, airway patent.  Moderate mucositis.

## 2023-12-27 NOTE — PHYSICAL EXAM
[de-identified] : Posttreatment changes, no LAD, mild dermatitis. [Laryngoscopy Performed] : laryngoscopy was performed, see procedure section for findings [FreeTextEntry1] : Flap is healing well.  No concerning lesions in the OC/OPx on inspection or palpation.  Moderate mucositis.

## 2023-12-27 NOTE — PHYSICAL EXAM
[Normal] : no rashes [de-identified] : Posttreatment changes, mild dermatitis, no LAD. [FreeTextEntry1] : Flap is healing well.  No concerning lesions in the OC/OPx on inspection or palpation.  Moderate mucositis.

## 2023-12-27 NOTE — HISTORY OF PRESENT ILLNESS
[de-identified] : Pt is refer by gómez Rodriguez for tongue cancer  and S/P right hemiglossectomy, right selective neck dissection, left radioforearm free flap and tracheostomy tube placement on 08/30/2023 and path Squamous cell carcinoma, well differentiated, Resection margins are negative for carcinoma. Pt completed RT with Dr. Garcia on 12/8/2023 and pt is continue to f/u with Dr. Jenkins.  PT is f/u and swelling of the tongue and right side of neck and some dermatitis from RT of the right neck.  pt has pain on swallowing and lot of mucus from the mouth. some wt loss, on soft diet and puree.  Pt has right shoulder weakness and planning to work with occupational therapy. Trach site had healed.    former smoker social etoh.

## 2023-12-27 NOTE — CONSULT LETTER
[Dear  ___] : Dear  [unfilled], [Courtesy Letter:] : I had the pleasure of seeing your patient, [unfilled], in my office today. [Please see my note below.] : Please see my note below. [Consult Closing:] : Thank you very much for allowing me to participate in the care of this patient.  If you have any questions, please do not hesitate to contact me. [Sincerely,] : Sincerely, [FreeTextEntry2] : Dr. Karoline Alves [address],  [place] [address] [FreeTextEntry3] : Dev

## 2023-12-28 ENCOUNTER — APPOINTMENT (OUTPATIENT)
Dept: OTOLARYNGOLOGY | Facility: CLINIC | Age: 63
End: 2023-12-28
Payer: COMMERCIAL

## 2023-12-28 PROCEDURE — 92526 ORAL FUNCTION THERAPY: CPT | Mod: GN

## 2024-01-04 ENCOUNTER — APPOINTMENT (OUTPATIENT)
Dept: OTOLARYNGOLOGY | Facility: CLINIC | Age: 64
End: 2024-01-04
Payer: COMMERCIAL

## 2024-01-04 PROCEDURE — 92526 ORAL FUNCTION THERAPY: CPT | Mod: GN

## 2024-01-08 ENCOUNTER — NON-APPOINTMENT (OUTPATIENT)
Age: 64
End: 2024-01-08

## 2024-01-08 ENCOUNTER — APPOINTMENT (OUTPATIENT)
Dept: RADIATION ONCOLOGY | Facility: CLINIC | Age: 64
End: 2024-01-08
Payer: COMMERCIAL

## 2024-01-08 VITALS
DIASTOLIC BLOOD PRESSURE: 81 MMHG | RESPIRATION RATE: 16 BRPM | HEART RATE: 66 BPM | SYSTOLIC BLOOD PRESSURE: 158 MMHG | BODY MASS INDEX: 35.67 KG/M2 | OXYGEN SATURATION: 99 % | WEIGHT: 221 LBS

## 2024-01-08 PROCEDURE — 99214 OFFICE O/P EST MOD 30 MIN: CPT

## 2024-01-08 NOTE — DISEASE MANAGEMENT
[FreeTextEntry4] : right tongue, squamous cell, well-differentiated [TTNM] : 1 [NTNM] : 0 [MTNM] : 0

## 2024-01-08 NOTE — REVIEW OF SYSTEMS
[Negative] : Allergic/Immunologic [Dysphagia: Grade 1 - Symptomatic, able to eat regular diet] : Dysphagia: Grade 1 - Symptomatic, able to eat regular diet [Edema Limbs: Grade 0] : Edema Limbs: Grade 0  [Fatigue: Grade 0] : Fatigue: Grade 0 [Localized Edema: Grade 1 - Localized to dependent areas, no disability or functional impairment] : Localized Edema: Grade 1 - Localized to dependent areas, no disability or functional impairment [Neck Edema: Grade 1 - Asymptomatic localized neck edema] : Neck Edema: Grade 1 - Asymptomatic localized neck edema [Mucositis Oral: Grade 0] : Mucositis Oral: Grade 0  [Xerostomia: Grade 1 - Symptomatic (e.g., dry or thick saliva) without significant dietary alteration; unstimulated saliva flow >0.2 ml/min] : Xerostomia: Grade 1 - Symptomatic (e.g., dry or thick saliva) without significant dietary alteration; unstimulated saliva flow >0.2 ml/min [Oral Pain: Grade 1 - Mild pain] : Oral Pain: Grade 1 - Mild pain [Dysgeusia: Grade 1- Altered taste but no change in diet] : Dysgeusia: Grade 1 - Altered taste but no change in diet [Skin Hyperpigmentation: Grade 0] : Skin Hyperpigmentation: Grade 0 [Dermatitis Radiation: Grade 0] : Dermatitis Radiation: Grade 0

## 2024-01-08 NOTE — PHYSICAL EXAM
[Normal] : oriented to person, place and time, the affect was normal, the mood was normal and not anxious [de-identified] : edema right neck and submental edema

## 2024-01-08 NOTE — VITALS
[Maximal Pain Intensity: 5/10] : 5/10 [Least Pain Intensity: 0/10] : 0/10 [Pain Description/Quality: ___] : Pain description/quality: [unfilled] [Pain Duration: ___] : Pain duration: [unfilled] [Pain Location: ___] : Pain Location: [unfilled] [Pain Interferes with ADLs] : Pain interferes with activities of daily living. [Adjuvant (neuroleptics)] : adjuvant (neuroleptics) [80: Normal activity with effort; some signs or symptoms of disease.] : 80: Normal activity with effort; some signs or symptoms of disease.

## 2024-01-08 NOTE — HISTORY OF PRESENT ILLNESS
[FreeTextEntry1] : 62 year old woman returns today s/p 6,000cGY to the head & neck for a T1N0M0 SCCA of the tongue completed 12/8/23. Interval history: Reports ongoing xerostomia, dysgeusia, improving odynophagia, improving dysphagia, neck pain/stiffness, neck lymphedema, and weight loss of 30lbs during treatment No fatigue or further weight loss She will undergo an EMG this Wednesday for her right arm  Care team: Plastics: Twan Jenkins PCP Yamilex Velasquez PMR Shawn Davenport

## 2024-01-10 ENCOUNTER — APPOINTMENT (OUTPATIENT)
Dept: PHYSICAL MEDICINE AND REHAB | Facility: CLINIC | Age: 64
End: 2024-01-10
Payer: COMMERCIAL

## 2024-01-10 VITALS
BODY MASS INDEX: 35.52 KG/M2 | WEIGHT: 221 LBS | SYSTOLIC BLOOD PRESSURE: 162 MMHG | HEIGHT: 66 IN | DIASTOLIC BLOOD PRESSURE: 83 MMHG | RESPIRATION RATE: 18 BRPM | HEART RATE: 59 BPM

## 2024-01-10 PROCEDURE — 95908 NRV CNDJ TST 3-4 STUDIES: CPT

## 2024-01-10 PROCEDURE — 95886 MUSC TEST DONE W/N TEST COMP: CPT | Mod: RT

## 2024-01-10 NOTE — PROCEDURE
[de-identified] : PRE-PROCEDURE  * Was H&P completed and in chart at time of procedure ?  YES * Were the indications for the procedure appropriate ?  YES * Were the practitioner's entries in the patient's medical record appropriate ?  YES  PROCEDURE * Did the practitioner maintain proper sterile technique ?  YES * If bleeding was encountered, did the practitioner manage it appropriately?  YES * Were complications, if any, recognized and managed appropriately?  YES * Was the practitioner's use of diagnostic services (e.g., lab, x-ray, MRI, CT) appropriate?  YES  POST-PROCEDURE * Did the pre-procedure diagnosis coincide with the findings of the procedure?  YES * Was the procedure report complete, accurate and timely?  YES

## 2024-01-10 NOTE — ASSESSMENT
[FreeTextEntry1] : EMG/NCS RIGHT UE performed at today's office visit.  Results significant for (1) mild, demyelinating, sensorimotor, median mononeuropathy across the right wrist; (2) mild, demyelinating, motor, ulnar mononeuropathy across the right elbow; (3) relatively acute C7 radiculopathy affecting the motor axons.  Full report to follow.

## 2024-01-12 ENCOUNTER — APPOINTMENT (OUTPATIENT)
Dept: OTOLARYNGOLOGY | Facility: CLINIC | Age: 64
End: 2024-01-12
Payer: COMMERCIAL

## 2024-01-12 PROCEDURE — 92526 ORAL FUNCTION THERAPY: CPT | Mod: GN

## 2024-01-13 NOTE — DISCHARGE NOTE PROVIDER - NSDCDCMDCOMP_GEN_ALL_CORE
1922  12 lead ECG completed, given to Dr. Santillan for review  
2519 Pt's Ashley Rush called for update and can be reached at [phone]  
2W w/telemetry HOLD  
This document is complete and the patient is ready for discharge.

## 2024-01-16 ENCOUNTER — APPOINTMENT (OUTPATIENT)
Dept: OTOLARYNGOLOGY | Facility: CLINIC | Age: 64
End: 2024-01-16

## 2024-01-22 ENCOUNTER — APPOINTMENT (OUTPATIENT)
Dept: PHYSICAL MEDICINE AND REHAB | Facility: CLINIC | Age: 64
End: 2024-01-22
Payer: COMMERCIAL

## 2024-01-22 ENCOUNTER — APPOINTMENT (OUTPATIENT)
Dept: OTOLARYNGOLOGY | Facility: CLINIC | Age: 64
End: 2024-01-22
Payer: COMMERCIAL

## 2024-01-22 VITALS
TEMPERATURE: 98.6 F | OXYGEN SATURATION: 99 % | RESPIRATION RATE: 17 BRPM | WEIGHT: 219.44 LBS | HEART RATE: 67 BPM | BODY MASS INDEX: 35.27 KG/M2 | SYSTOLIC BLOOD PRESSURE: 127 MMHG | DIASTOLIC BLOOD PRESSURE: 79 MMHG | HEIGHT: 66 IN

## 2024-01-22 DIAGNOSIS — M54.2 CERVICALGIA: ICD-10-CM

## 2024-01-22 PROCEDURE — 99214 OFFICE O/P EST MOD 30 MIN: CPT | Mod: 25

## 2024-01-22 PROCEDURE — 31575 DIAGNOSTIC LARYNGOSCOPY: CPT

## 2024-01-22 PROCEDURE — 99214 OFFICE O/P EST MOD 30 MIN: CPT

## 2024-01-22 NOTE — HISTORY OF PRESENT ILLNESS
[de-identified] : Pt with tongue cancer  and S/P right hemiglossectomy, right selective neck dissection, left radioforearm free flap and tracheostomy tube placement on 08/30/2023 and path Squamous cell carcinoma, well differentiated, Resection margins are negative for carcinoma. Pt completed RT with Dr. Garcia on 12/8/2023 and Dr. Jenkins f/u as need.  PT is f/u and swelling of the tongue and oral ulcer from RT still painful.   PT also right neck pain, and right shoulder weakness working dr. Palmer ( order MRI of spine)  and occupational therapy and SLP for lymphedema therapy. some wt loss, on soft diet and puree.   former smoker social etoh.

## 2024-01-22 NOTE — CONSULT LETTER
[Dear  ___] : Dear  [unfilled], [Courtesy Letter:] : I had the pleasure of seeing your patient, [unfilled], in my office today. [Please see my note below.] : Please see my note below. [Consult Closing:] : Thank you very much for allowing me to participate in the care of this patient.  If you have any questions, please do not hesitate to contact me. [Sincerely,] : Sincerely, [FreeTextEntry2] : Dr. Karoline Alves [address],  [place] [address] [FreeTextEntry3] : Dev

## 2024-01-22 NOTE — HISTORY OF PRESENT ILLNESS
[FreeTextEntry1] : Ms. 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  She reports she still feels some weakness with right shoulder extension.  Reports that it is mildly improving.  Also reports her shoulder range of motion is doing better.  Reports overall pain is improving but still the worst and radiating into her arm.  Taking gabapentin which is helping.  Occasionally taking oxycodone for severe pain.  Recently had EMG report.  No bowel bladder dysfunction.  Cervical edema improving.

## 2024-01-22 NOTE — ASSESSMENT
[FreeTextEntry1] : 63 year old female presenting for evaluation.  #Neuropathic pain -Reviewed EMG, concern for C7 radiculopathy  -Obtain MRI with and without IV contrast to evaluate, she reports she can have MRI -Continue gabapentin 600 mg 3 times daily  #Scapular winging -Has a component of myofascial pain associated with this -Continue OT   #Lymphedema: -Educated about Lymphedema -Continue MLD   #Dysphagia -SLP  Follow up in 4 weeks.

## 2024-01-22 NOTE — PHYSICAL EXAM
[Normal] : no rashes [Laryngoscopy Performed] : laryngoscopy was performed, see procedure section for findings [de-identified] : Posttreatment changes, no LAD. [FreeTextEntry1] : Flap is healing well.  No concerning lesions in the OC/OPx on inspection or palpation.  There is an area of superficial dental trauma along the left lateral tongue which seems to be healing.

## 2024-01-22 NOTE — PROCEDURE
[Dysphagia] : dysphagia not clearly evaluated by indirect laryngoscopy [None] : none [Flexible Endoscope] : examined with the flexible endoscope [Serial Number: ___] : Serial Number: [unfilled] [de-identified] : No lesions in the NPx, OPx, HPx or larynx.  Expected posttreatment changes, VC are mobile, airway patent.  Moderate mucositis.

## 2024-01-26 ENCOUNTER — APPOINTMENT (OUTPATIENT)
Dept: OTOLARYNGOLOGY | Facility: CLINIC | Age: 64
End: 2024-01-26
Payer: COMMERCIAL

## 2024-01-26 PROCEDURE — 92526 ORAL FUNCTION THERAPY: CPT | Mod: GN

## 2024-01-29 NOTE — PHYSICAL EXAM
[NI] : Normal [de-identified] : Intraoral exam multiple ulcers with erythema and swelling postoperative radiation changes.  Neck incision clean dry intact [de-identified] : Left forearm skin graft well-healed, incision c/d/i healing well no erythema no sign of infection

## 2024-01-29 NOTE — REASON FOR VISIT
[Follow-Up: _____] : a [unfilled] follow-up visit [FreeTextEntry1] : s/p left radial forearm free flap, STSG, anterior vestibuloplasty, complex closure of neck DOS: 08/30/23. Patient states she has been doing well and reports she completed XRT on 12/08/23. She states that since she completed the XRT, her mucus build-up in her mouth has improved but she continues to note the sores of her tongue that cause discomfort with eating. Otherwise no concerns or complaints.

## 2024-01-29 NOTE — HISTORY OF PRESENT ILLNESS
[FreeTextEntry1] : 63-year-old female status post partial glossectomy with a radial forearm free flap reconstruction.  Patient patient has completed radiation therapy.  Patient presents today for follow-up visit

## 2024-02-01 ENCOUNTER — OUTPATIENT (OUTPATIENT)
Dept: OUTPATIENT SERVICES | Facility: HOSPITAL | Age: 64
LOS: 1 days | End: 2024-02-01
Payer: COMMERCIAL

## 2024-02-01 ENCOUNTER — APPOINTMENT (OUTPATIENT)
Dept: MRI IMAGING | Facility: CLINIC | Age: 64
End: 2024-02-01

## 2024-02-01 DIAGNOSIS — Z98.890 OTHER SPECIFIED POSTPROCEDURAL STATES: Chronic | ICD-10-CM

## 2024-02-01 DIAGNOSIS — M54.2 CERVICALGIA: ICD-10-CM

## 2024-02-01 DIAGNOSIS — Z96.641 PRESENCE OF RIGHT ARTIFICIAL HIP JOINT: Chronic | ICD-10-CM

## 2024-02-01 PROCEDURE — 72156 MRI NECK SPINE W/O & W/DYE: CPT | Mod: 26

## 2024-02-02 ENCOUNTER — APPOINTMENT (OUTPATIENT)
Dept: OTOLARYNGOLOGY | Facility: CLINIC | Age: 64
End: 2024-02-02
Payer: COMMERCIAL

## 2024-02-02 PROCEDURE — 92526 ORAL FUNCTION THERAPY: CPT | Mod: GN

## 2024-02-08 ENCOUNTER — APPOINTMENT (OUTPATIENT)
Dept: ORTHOPEDIC SURGERY | Facility: CLINIC | Age: 64
End: 2024-02-08
Payer: COMMERCIAL

## 2024-02-08 VITALS
HEIGHT: 66 IN | DIASTOLIC BLOOD PRESSURE: 81 MMHG | SYSTOLIC BLOOD PRESSURE: 124 MMHG | BODY MASS INDEX: 34.55 KG/M2 | HEART RATE: 62 BPM | WEIGHT: 215 LBS

## 2024-02-08 DIAGNOSIS — C06.9 MALIGNANT NEOPLASM OF MOUTH, UNSPECIFIED: ICD-10-CM

## 2024-02-08 DIAGNOSIS — R29.898 OTHER SYMPTOMS AND SIGNS INVOLVING THE MUSCULOSKELETAL SYSTEM: ICD-10-CM

## 2024-02-08 PROCEDURE — 99205 OFFICE O/P NEW HI 60 MIN: CPT

## 2024-02-08 NOTE — HISTORY OF PRESENT ILLNESS
[de-identified] : Suha is a very pleasant 63-year-old female presents for surgical evaluation with regard to a right-sided C6-C7 herniated disc.  She has been referred from the physical medicine rehabilitation department with regard to this issue as well as progressive motor deficits and sensory deficits in the right upper extremity these been progressively getting worse since October 2023 failing occupational therapy and oral medication from a much larger perspective she has cancer/squamous cell carcinoma of the mouth she just recently underwent an open excision of part of her tongue from a right-sided neck dissection.  Her APRIL questionnaire is negative she just completed Radiation therapy [Worsening] : worsening [___ mths] : [unfilled] month(s) ago [3] : a current pain level of 3/10 [10] : a maximum pain level of 10/10 [Lifting] : worsened by lifting [NSAIDs] : relieved by nonsteroidal anti-inflammatory drugs [Rest] : relieved by rest [Ataxia] : no ataxia [Incontinence] : no incontinence [Urinary Ret.] : no urinary retention

## 2024-02-08 NOTE — PHYSICAL EXAM
[de-identified] : CONSTITUTIONAL:  Patient is a very pleasant individual who is well-nourished and appears stated age.  PSYCHIATRIC:  Alert and oriented times three and in no apparent distress, and participates with orthopedic evaluation well. HEAD:  Atraumatic and  nonsyndromic in appearance. EENT: No thyromegaly, EOMI. RESPIRATORY:  Respiratory rate is regular, not dyspneic on examination. LYMPHATICS:  There is no cervical or axillary lymphadenopathy. INTEGUMENTARY:  Skin is clean, dry, and intact about the bilateral upper extremities and cervical spine.  VASCULAR:   There is brisk capillary refill about the bilateral upper extremities and radial pulses are 2/4.  NEUROLOGIC: Positive L'hirmitte,  positive Spurling's sign. There are no pathologic reflexes. There is a decrease in sensation of the  right upper extremities on manual examination,  within the C7 distribution.  Deep tendon reflexes are well-maintained at +2/4 of the bilateral upper extremities and are symmetric.  right tricep reflexes diminished 1/4 MUSCULOSKELETAL:  There is  visible muscular atrophy  of the right tricep.  Manual motor strength is well maintained in the bilateral upper extremities,  however, right tricep is noting weakness of approximately 3/5.  Cervical range of motion is well maintained.  The patient ambulates in a non-myelopathic manner. Normal secondary orthopaedic exam of bilateral shoulders, elbows and hands.  Elbow flexion and extension, wrist extension, finger flexion and abduction are well maintained.    [de-identified] : Cervical MRI has been reviewed that demonstrates a very large prominent right-sided C6-C7 herniated disc to a varying degree there is also C4-5 C5-C6 cervical spondylosis.  CAT scan from the summer 2023 is also been reviewed showing cervical spondylosis at 4556 and 6 7.  X-rays have also been reviewed from [facility] views of the cervical spine reviewed showing 4556 and 6 7 cervical spondylosis

## 2024-02-08 NOTE — DISCUSSION/SUMMARY
[de-identified] : Very pleasant surgical conversation was conducted with this patient with regard to a C6-C7 right-sided herniated disc with progressive motor loss in the right upper extremity/tricep complex surgical planning and discussion was conducted because she is also in the process of recovering from head and neck cancer surgery she just had a half of her or a portion of her tongue removed from a right sided neck dissection this occurred in the summer 2023.  Based upon progressive motor deficit and right upper extremity surgery was discussed patient wishes to hold off because she recently just completed chemotherapy she is scheduled to undergo PET scanning repeat CAT scans to make sure there is no recurrence.  I think this is a very reasonable approach to this making sure she is cancer free before undergoing an additional neck dissection to try and treat this progressive right upper extremity deficit again I have discussed surgery can be reasonable if patient feels felt the signs and symptoms are intractable she feels the weakness and pain is somewhat relative and manageable compared to a greater concept of cancer survival.  She is juan carlos follow-up in 6 weeks she is going to be placed in physical therapy she has also been discussed and demonstrated home exercises with right upper extremity and bilateral upper extremity strength training next clinical assessment I like to have flexion and extension views of the cervical spine to evaluate the cephalad components of 5 6 and 4 5 as well.

## 2024-02-16 ENCOUNTER — APPOINTMENT (OUTPATIENT)
Dept: OTOLARYNGOLOGY | Facility: CLINIC | Age: 64
End: 2024-02-16
Payer: COMMERCIAL

## 2024-02-16 PROCEDURE — 92526 ORAL FUNCTION THERAPY: CPT | Mod: GN

## 2024-02-26 ENCOUNTER — APPOINTMENT (OUTPATIENT)
Dept: OTOLARYNGOLOGY | Facility: CLINIC | Age: 64
End: 2024-02-26
Payer: COMMERCIAL

## 2024-02-26 PROCEDURE — 99214 OFFICE O/P EST MOD 30 MIN: CPT | Mod: 25

## 2024-02-26 PROCEDURE — 31575 DIAGNOSTIC LARYNGOSCOPY: CPT

## 2024-02-26 RX ORDER — SCOPOLAMINE 1.5 MG/1
1 PATCH, EXTENDED RELEASE TRANSDERMAL
Qty: 5 | Refills: 3 | Status: COMPLETED | COMMUNITY
Start: 2023-12-07 | End: 2024-02-26

## 2024-02-26 RX ORDER — TRAMADOL HYDROCHLORIDE 100 MG/1
100 TABLET, EXTENDED RELEASE ORAL DAILY
Qty: 30 | Refills: 0 | Status: COMPLETED | COMMUNITY
Start: 2023-11-13 | End: 2024-02-26

## 2024-02-26 RX ORDER — TRAMADOL HYDROCHLORIDE 200 MG/1
200 TABLET, EXTENDED RELEASE ORAL
Qty: 30 | Refills: 0 | Status: COMPLETED | COMMUNITY
Start: 2023-11-10 | End: 2024-02-26

## 2024-02-26 RX ORDER — OXYCODONE HYDROCHLORIDE 5 MG/5ML
5 SOLUTION ORAL
Qty: 600 | Refills: 0 | Status: COMPLETED | COMMUNITY
Start: 2023-11-14 | End: 2024-02-26

## 2024-02-26 RX ORDER — TRAMADOL HYDROCHLORIDE 50 MG/1
50 TABLET, COATED ORAL 3 TIMES DAILY
Qty: 30 | Refills: 0 | Status: COMPLETED | COMMUNITY
Start: 2023-11-13 | End: 2024-02-26

## 2024-02-26 RX ORDER — ALPRAZOLAM 0.5 MG/1
0.5 TABLET ORAL
Qty: 1 | Refills: 0 | Status: COMPLETED | COMMUNITY
Start: 2024-01-22 | End: 2024-02-26

## 2024-02-26 RX ORDER — SILVER SULFADIAZINE 10 MG/G
1 CREAM TOPICAL
Qty: 1 | Refills: 5 | Status: COMPLETED | COMMUNITY
Start: 2023-12-07 | End: 2024-02-26

## 2024-02-26 NOTE — CONSULT LETTER
[Dear  ___] : Dear  [unfilled], [Courtesy Letter:] : I had the pleasure of seeing your patient, [unfilled], in my office today. [Please see my note below.] : Please see my note below. [Consult Closing:] : Thank you very much for allowing me to participate in the care of this patient.  If you have any questions, please do not hesitate to contact me. [Sincerely,] : Sincerely, [FreeTextEntry3] : Dev [FreeTextEntry2] : Dr. Karoline Alves [address],  [place] [address]

## 2024-02-26 NOTE — PHYSICAL EXAM
[Normal] : no rashes [Laryngoscopy Performed] : laryngoscopy was performed, see procedure section for findings [de-identified] : Posttreatment changes, no LAD. [FreeTextEntry1] : Flap is healing well.  No concerning lesions in the OC/OPx on inspection or palpation.  There is an area of superficial dental trauma along the area of reconstruction.

## 2024-02-26 NOTE — HISTORY OF PRESENT ILLNESS
[de-identified] : Pt with tongue cancer  and S/P right hemiglossectomy, right selective neck dissection, left radioforearm free flap and tracheostomy tube placement on 08/30/2023 and path Squamous cell carcinoma, well differentiated, Resection margins are negative for carcinoma. Pt completed RT with Dr. Garcia on 12/8/2023 and Dr. Jenkins f/u as need.   PT is f/u and swelling of the right tongue, right jaw pain, neck feels hard, and oral ulcer/back of the throat with pain and here for double check.   Ct of neck and chest schedule 3/5/2024. PT also right neck pain, and right shoulder weakness working dr. Palmer (MRI of spine -working with spine surgeon for budging disc) and occupational therapy and SLP for lymphedema therapy. regular diet, wt stable.      former smoker social etoh.

## 2024-02-26 NOTE — PROCEDURE
[Dysphagia] : dysphagia not clearly evaluated by indirect laryngoscopy [Flexible Endoscope] : examined with the flexible endoscope [None] : none [Serial Number: ___] : Serial Number: [unfilled] [de-identified] : No lesions in the NPx, OPx, HPx or larynx.  Expected posttreatment changes, VC are mobile, airway patent.

## 2024-02-27 ENCOUNTER — APPOINTMENT (OUTPATIENT)
Dept: PHYSICAL MEDICINE AND REHAB | Facility: CLINIC | Age: 64
End: 2024-02-27
Payer: COMMERCIAL

## 2024-02-27 VITALS
HEIGHT: 66 IN | SYSTOLIC BLOOD PRESSURE: 127 MMHG | BODY MASS INDEX: 34.55 KG/M2 | WEIGHT: 215 LBS | DIASTOLIC BLOOD PRESSURE: 81 MMHG | HEART RATE: 63 BPM

## 2024-02-27 PROCEDURE — 99214 OFFICE O/P EST MOD 30 MIN: CPT

## 2024-02-27 NOTE — PHYSICAL EXAM
[FreeTextEntry1] : Gen: Patient is A&O x 3, NAD HEENT: EOMI, hearing grossly normal, Tongue ulcerations  Resp: regular, non - labored CV: pulses regular Skin: no rashes, erythema, no increased warmth  Lymph: +Submental/cervical lymphedema  Inspection: Mild scapula wining  ROM: full throughout Palpation: TTP right periscapular muscles  Sensation: intact to light touch Reflexes: 1+ and symmetric throughout Strength: 5/5 throughout, except 4+/5 in Right elbow extension Special tests: -Stanford sign, -Hoffmans sign, no clonus, +Spurling sign  Gait: normal, non-antalgic

## 2024-02-27 NOTE — HISTORY OF PRESENT ILLNESS
[FreeTextEntry1] : Ms. 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  She reports she still is having weakness with elbow extension.  Saw orthospine.  She would like to start physical therapy first.  No bowel bladder dysfunction or any other new weakness.  Still having some neck pain.  Gabapentin is helping no side effects.  Still has submental lymphedema.  Working with SLP.

## 2024-02-27 NOTE — ASSESSMENT
[FreeTextEntry1] : 63 year old female presenting for evaluation.  #Neuropathic pain -Reviewed EMG, concern for C7 radiculopathy  -MRI reviewed, continue to follow with ortho spine -Continue gabapentin 600 mg 3 times daily-rx sent   #Scapular winging -Has a component of myofascial pain associated with this -Continue OT   #Lymphedema: -Continue MLD -Recommend pneumatic compression pump -Lymphedema I89.0 secondary to Head and Neck Cancer.  Patient has attempted use of compression, elevation and exercise for a period of over 4 weeks without any significant improvement in symptoms or swelling and swelling Early signs of fibrosis noted due to surgery/radiation effects.   #Dysphagia -SLP  Follow up in 4 weeks.

## 2024-03-01 ENCOUNTER — APPOINTMENT (OUTPATIENT)
Dept: OTOLARYNGOLOGY | Facility: CLINIC | Age: 64
End: 2024-03-01
Payer: COMMERCIAL

## 2024-03-01 PROCEDURE — 92526 ORAL FUNCTION THERAPY: CPT | Mod: GN

## 2024-03-01 RX ORDER — ALPRAZOLAM 0.5 MG/1
0.5 TABLET ORAL
Qty: 1 | Refills: 0 | Status: ACTIVE | COMMUNITY
Start: 2024-03-01 | End: 1900-01-01

## 2024-03-05 ENCOUNTER — APPOINTMENT (OUTPATIENT)
Dept: NUCLEAR MEDICINE | Facility: CLINIC | Age: 64
End: 2024-03-05
Payer: COMMERCIAL

## 2024-03-05 ENCOUNTER — OUTPATIENT (OUTPATIENT)
Dept: OUTPATIENT SERVICES | Facility: HOSPITAL | Age: 64
LOS: 1 days | End: 2024-03-05

## 2024-03-05 ENCOUNTER — APPOINTMENT (OUTPATIENT)
Dept: CT IMAGING | Facility: CLINIC | Age: 64
End: 2024-03-05
Payer: COMMERCIAL

## 2024-03-05 DIAGNOSIS — Z98.890 OTHER SPECIFIED POSTPROCEDURAL STATES: Chronic | ICD-10-CM

## 2024-03-05 DIAGNOSIS — Z00.8 ENCOUNTER FOR OTHER GENERAL EXAMINATION: ICD-10-CM

## 2024-03-05 DIAGNOSIS — Z96.641 PRESENCE OF RIGHT ARTIFICIAL HIP JOINT: Chronic | ICD-10-CM

## 2024-03-05 PROCEDURE — 78815 PET IMAGE W/CT SKULL-THIGH: CPT | Mod: 26,PS

## 2024-03-05 PROCEDURE — 70491 CT SOFT TISSUE NECK W/DYE: CPT | Mod: 26

## 2024-03-11 ENCOUNTER — APPOINTMENT (OUTPATIENT)
Dept: OTOLARYNGOLOGY | Facility: CLINIC | Age: 64
End: 2024-03-11
Payer: COMMERCIAL

## 2024-03-11 PROCEDURE — 31575 DIAGNOSTIC LARYNGOSCOPY: CPT

## 2024-03-11 PROCEDURE — 99215 OFFICE O/P EST HI 40 MIN: CPT | Mod: 25

## 2024-03-11 RX ORDER — OXYCODONE 5 MG/1
5 TABLET ORAL
Qty: 120 | Refills: 0 | Status: COMPLETED | COMMUNITY
Start: 2023-11-14 | End: 2024-03-11

## 2024-03-11 NOTE — HISTORY OF PRESENT ILLNESS
Anesthesia Evaluation     Patient summary reviewed and Nursing notes reviewed   NPO Solid Status: > 2 hours  NPO Liquid Status: > 8 hours           Airway   Mallampati: II  TM distance: >3 FB  Neck ROM: full  No difficulty expected  Dental - normal exam     Pulmonary    Cardiovascular     Rhythm: regular    (+) hypertension      Neuro/Psych  (+) headaches, psychiatric history  GI/Hepatic/Renal/Endo    (+) diabetes mellitus    Musculoskeletal     Abdominal    Substance History      OB/GYN    (+) Pregnant        Other        ROS/Med Hx Other: 12/15/23 05:54  WBC: 9.70  RBC: 3.93  Hemoglobin: 12.1  Hematocrit: 36.2  Platelets: 219  RDW: 15.7 (H)  MCV: 92.3  MCH: 30.8  MCHC: 33.4  MPV: 9.3  RDW-SD: 50.3                  Anesthesia Plan    ASA 2     epidural       Anesthetic plan, risks, benefits, and alternatives have been provided, discussed and informed consent has been obtained with: patient.  Pre-procedure education provided  Use of blood products discussed with patient  Consented to blood products.    Plan discussed with CRNA and attending.    CODE STATUS:    Level Of Support Discussed With: Patient  Code Status (Patient has no pulse and is not breathing): CPR (Attempt to Resuscitate)  Medical Interventions (Patient has pulse or is breathing): Full Support      
[de-identified] : Pt with tongue cancer  and S/P right hemiglossectomy, right selective neck dissection, left radioforearm free flap and tracheostomy tube placement on 08/30/2023 and path Squamous cell carcinoma, well differentiated, Resection margins are negative for carcinoma. Pt completed RT with Dr. Garcia on 12/8/2023 and Dr. Jenkins f/u as need.  Last ct of neck and PET ct on 3/5/2024 PT is f/u and still has swelling of face and tongue, right jaw pain,  workign with SLp and Dr. Palmer.  Pt is also f/u on oral ulcer/back of the throat with pain- some what better.     former smoker social etoh.

## 2024-03-11 NOTE — DATA REVIEWED
[de-identified] : PET/CT and CT Neck independently interpreted - there is increased avidity in the right posterolarteral oral cavity, nonspecific.  No clear lesion on CT but significant dental artifact.

## 2024-03-11 NOTE — PROCEDURE
[None] : none [Dysphagia] : dysphagia not clearly evaluated by indirect laryngoscopy [Flexible Endoscope] : examined with the flexible endoscope [Serial Number: ___] : Serial Number: [unfilled] [de-identified] : No lesions in the NPx, OPx, HPx or larynx.  Expected posttreatment changes, VC are mobile, airway patent.

## 2024-03-11 NOTE — PHYSICAL EXAM
[Normal] : no rashes [Laryngoscopy Performed] : laryngoscopy was performed, see procedure section for findings [de-identified] : Posttreatment changes, no LAD. [FreeTextEntry1] : Flap is healing well.  No concerning lesions in the OC/OPx on inspection or palpation.  No obvious lesions on inspection of palpation.  No TTP.

## 2024-03-12 ENCOUNTER — NON-APPOINTMENT (OUTPATIENT)
Age: 64
End: 2024-03-12

## 2024-03-12 ENCOUNTER — APPOINTMENT (OUTPATIENT)
Dept: RADIATION ONCOLOGY | Facility: CLINIC | Age: 64
End: 2024-03-12
Payer: COMMERCIAL

## 2024-03-12 VITALS
RESPIRATION RATE: 16 BRPM | HEART RATE: 62 BPM | SYSTOLIC BLOOD PRESSURE: 144 MMHG | HEIGHT: 66 IN | WEIGHT: 216 LBS | TEMPERATURE: 97 F | OXYGEN SATURATION: 100 % | BODY MASS INDEX: 34.72 KG/M2 | DIASTOLIC BLOOD PRESSURE: 82 MMHG

## 2024-03-12 PROCEDURE — 99214 OFFICE O/P EST MOD 30 MIN: CPT

## 2024-03-12 NOTE — REVIEW OF SYSTEMS
[Dysphagia] : dysphagia [Dysphagia: Grade 1 - Symptomatic, able to eat regular diet] : Dysphagia: Grade 1 - Symptomatic, able to eat regular diet [Negative] : Allergic/Immunologic [Edema Limbs: Grade 0] : Edema Limbs: Grade 0  [Localized Edema: Grade 1 - Localized to dependent areas, no disability or functional impairment] : Localized Edema: Grade 1 - Localized to dependent areas, no disability or functional impairment [Fatigue: Grade 0] : Fatigue: Grade 0 [Xerostomia: Grade 2 - Moderate symptoms; oral intake alterations (e.g., copious water, other lubricants, diet limited to purees and/or soft, moist foods); unstimulated saliva 0.1 to 0.2 ml/min] : Xerostomia: Grade 2 - Moderate symptoms; oral intake alterations (e.g., copious water, other lubricants, diet limited to purees and/or soft, moist foods); unstimulated saliva 0.1 to 0.2 ml/min [Neck Edema: Grade 1 - Asymptomatic localized neck edema] : Neck Edema: Grade 1 - Asymptomatic localized neck edema [Dysgeusia: Grade 1- Altered taste but no change in diet] : Dysgeusia: Grade 1 - Altered taste but no change in diet

## 2024-03-12 NOTE — PHYSICAL EXAM
[Extraocular Movements] : extraocular movements were intact [Outer Ear] : the ears and nose were normal in appearance [Normal Oral Cavity] : oral cavity was normal [Oropharynx] : The oropharynx was normal [Normal] : no palpable adenopathy [Cervical Lymph Nodes Enlarged Posterior Bilaterally] : posterior cervical [Cervical Lymph Nodes Enlarged Anterior Bilaterally] : anterior cervical [de-identified] : no visible or palpable oral / opx lesions [Skin Color & Pigmentation] : normal skin color and pigmentation [de-identified] : right face/neck submental lymphedema

## 2024-03-12 NOTE — HISTORY OF PRESENT ILLNESS
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  Interval history: 2/1/24 MRI C spine showed focal right intraforaminal disc protrusion at C6-C7 contributing to moderate to severe right neural foraminal narrowing. There is also mild right neural foraminal narrowing at C4-C5. No additional areas of significant central canal or neural foraminal narrowing within the cervical spine.  She was evaluated by Dr. Raghu Chen   3/5/24 Ct neck showed Nonspecific soft tissue focus corresponds with area of indeterminate increased FDG activity described on PET/CT from earlier the same day.  No new lymphadenopathy.  3/5/24 Pet CT showed indeterminate focus of increased FDG activity in right posterolateral oral cavity. No scan evidence of metastatic disease.  She saw Dr. Davenport 3/11/24; laryngoscopy negative.  Reports lymphedema right neck/face/submental areas (has compression garment), improving dysgeusia, improving neck pain/stiffness with OT/PT. Xerostomia after eating for a while. No odynophagia, fatigue or further weight loss.  Care team: Plastics: Twan Jenkins PCP Yamilex Velasquez PMR Shawn Pamler/speech Mary Carmen Davenport

## 2024-03-15 ENCOUNTER — APPOINTMENT (OUTPATIENT)
Dept: OTOLARYNGOLOGY | Facility: CLINIC | Age: 64
End: 2024-03-15
Payer: COMMERCIAL

## 2024-03-15 PROCEDURE — 92526 ORAL FUNCTION THERAPY: CPT | Mod: GN

## 2024-03-21 ENCOUNTER — APPOINTMENT (OUTPATIENT)
Dept: ORTHOPEDIC SURGERY | Facility: CLINIC | Age: 64
End: 2024-03-21
Payer: COMMERCIAL

## 2024-03-21 VITALS
WEIGHT: 216 LBS | HEART RATE: 59 BPM | BODY MASS INDEX: 34.72 KG/M2 | DIASTOLIC BLOOD PRESSURE: 86 MMHG | SYSTOLIC BLOOD PRESSURE: 145 MMHG | HEIGHT: 66 IN

## 2024-03-21 DIAGNOSIS — M47.22 OTHER SPONDYLOSIS WITH RADICULOPATHY, CERVICAL REGION: ICD-10-CM

## 2024-03-21 PROCEDURE — 99214 OFFICE O/P EST MOD 30 MIN: CPT

## 2024-03-21 PROCEDURE — 72040 X-RAY EXAM NECK SPINE 2-3 VW: CPT

## 2024-03-21 NOTE — HISTORY OF PRESENT ILLNESS
[de-identified] : Very pleasant 63-year-old female presents for evaluation of repeat clinical assessment of a C6-C7 herniated disc on the right-hand side.  Thankfully she states she is doing much much better.  Radiculopathy has vastly improved no significant pain.  APRIL questionnaire is negative.  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  Recent PET scan is positive as well as CAT scans for some soft tissue these juan carlos be actively followed up by her hematology oncology department.  But thankfully from a cervical spine perspective it appears that she is clinically improving which I think is beneficial because I think if we are forced to pursue ACDF surgery at this point in time may become problematic with this PET scan that is positive from an anterior cervical approach may be cumbersome with evaluating PET scan and oropharynx CAT scan data etc. [Improving] : improving [0] : a current pain level of 0/10 [2] : a maximum pain level of 2/10 [Intermit.] : ~He/She~ states the symptoms seem to be intermittent [Bending] : worsened by bending [Lifting] : worsened by lifting [Rest] : relieved by rest [NSAIDs] : relieved by nonsteroidal anti-inflammatory drugs [Ataxia] : no ataxia [Incontinence] : no incontinence [Urinary Ret.] : no urinary retention [Loss of Dexterity] : good dexterity

## 2024-03-21 NOTE — PHYSICAL EXAM
[de-identified] : CONSTITUTIONAL:  Patient is a very pleasant individual who is well-nourished and appears stated age.  PSYCHIATRIC:  Alert and oriented times three and in no apparent distress, and participates with orthopedic evaluation well. HEAD:  Atraumatic and  nonsyndromic in appearance. EENT: No thyromegaly, EOMI. RESPIRATORY:  Respiratory rate is regular, not dyspneic on examination. LYMPHATICS:  There is no cervical or axillary lymphadenopathy. INTEGUMENTARY:  Skin is clean, dry, and intact about the bilateral upper extremities and cervical spine.  Patient complains of left-sided facial and neck swelling status post excision of cancer VASCULAR:   There is brisk capillary refill about the bilateral upper extremities and radial pulses are 2/4.  NEUROLOGIC:  Negative L'hirmitte, negative Spurling's sign. There are no pathologic reflexes. There is no decrease in sensation of the bilateral upper extremities on manual examination.  Deep tendon reflexes are well-maintained at +2/4 of the bilateral upper extremities and are symmetric. MUSCULOSKELETAL:  There is no visible muscular atrophy.  Manual motor strength is well maintained in the bilateral upper extremities.  Cervical range of motion is well maintained.  The patient ambulates in a non-myelopathic manner. Normal secondary orthopaedic exam of bilateral shoulders, elbows and hands.  Elbow flexion and extension, wrist extension, finger flexion and abduction are well maintained.  Very mild cervicalgia at this point in time    [de-identified] : Flexion-extension views of the cervical spine have been reviewed on today's date again showing focal cervical spondylosis 4556 and 6 7 possibly a very trace spondylolisthesis at 4 5 may be more of a physiologic finding flex ex views March 21, 2024 reviewed.  2 views.  Cervical MRIs been reviewed from [facility] imaging demonstrating C4-5, C5-C6, C6-C7 cervical spondylosis with bilateral moderate to severe foraminal compromise at C6-C7 there is a very large herniated disc in the right lateral recess abutment of the cord producing moderate to severe spinal stenosis in this region.  Cervical CAT scan has been reviewed again showing corresponding C4 556 and 6 7 cervical spondylosis.  PET scan is also been reviewed showing some oropharynx hyperactivity CAT scan also mentions this clinical finding this needs to be further expanded to make sure there is not recurrence of squamous cell carcinoma.

## 2024-03-21 NOTE — DISCUSSION/SUMMARY
[de-identified] : Thankfully Suha is doing much better with regard to her cervical radiculopathy on the right-hand side radiculopathy in the CT 7 distribution is greatly improved there is no components of motor deficits I am very happy that this is the clinical presentation of improvement because her PET scan shows 1 area of focal increased activity that corresponds with the CAT scans can be following up with hematology oncology for further correlation.  She will follow-up with us in approximately 3 months to make sure the cervical spondylosis and radiculopathy are not worsening I think putting a cervical surgeon into her clinical portfolio at this point in time I think would be overall problematic for her recovery from her oropharynx surgery that was done back in August.  Again very pleased and happy that she is clinically improving from a right-sided cervical radiculopathy.

## 2024-03-29 ENCOUNTER — APPOINTMENT (OUTPATIENT)
Dept: OTOLARYNGOLOGY | Facility: CLINIC | Age: 64
End: 2024-03-29

## 2024-04-09 ENCOUNTER — APPOINTMENT (OUTPATIENT)
Dept: PHYSICAL MEDICINE AND REHAB | Facility: CLINIC | Age: 64
End: 2024-04-09
Payer: COMMERCIAL

## 2024-04-09 VITALS
SYSTOLIC BLOOD PRESSURE: 122 MMHG | HEIGHT: 66 IN | WEIGHT: 213 LBS | RESPIRATION RATE: 14 BRPM | BODY MASS INDEX: 34.23 KG/M2 | DIASTOLIC BLOOD PRESSURE: 80 MMHG | HEART RATE: 60 BPM

## 2024-04-09 PROCEDURE — 99214 OFFICE O/P EST MOD 30 MIN: CPT

## 2024-04-09 RX ORDER — MELOXICAM 15 MG/1
15 TABLET ORAL
Qty: 30 | Refills: 0 | Status: ACTIVE | COMMUNITY
Start: 2024-04-09 | End: 1900-01-01

## 2024-04-09 NOTE — ASSESSMENT
[FreeTextEntry1] : 63 year old female presenting for evaluation.  #Neuropathic pain -Titrate down gabapentin to 300mg TID   #Scapular winging -Has a component of myofascial pain associated with this -Continue OT   #Lymphedema: -Continue MLD -Continue compression garment  -Recommend pneumatic compression pump  #Left wrist pain: -Obtain left wrist x-ray -Start meloxicam 15mg daily with food, advised on prolonged risks of NSAID use   Follow up in 4-6 weeks.

## 2024-04-09 NOTE — PHYSICAL EXAM
[FreeTextEntry1] : Gen: Patient is A&O x 3, NAD HEENT: EOMI, hearing grossly normal, Tongue ulcerations  Resp: regular, non - labored CV: pulses regular Skin: no rashes, erythema, no increased warmth  Lymph: +Submental/cervical lymphedema  Inspection: Mild scapula wining  ROM: full throughout Palpation: TTP left radius  Sensation: intact to light touch Reflexes: 1+ and symmetric throughout Strength: 5/5 throughout Special tests: -Stanford sign, -Hoffmans sign, no clonus, -Spurling sign  Gait: normal, non-antalgic

## 2024-04-12 ENCOUNTER — APPOINTMENT (OUTPATIENT)
Dept: OTOLARYNGOLOGY | Facility: CLINIC | Age: 64
End: 2024-04-12
Payer: COMMERCIAL

## 2024-04-12 PROCEDURE — 92526 ORAL FUNCTION THERAPY: CPT | Mod: GN

## 2024-04-16 ENCOUNTER — APPOINTMENT (OUTPATIENT)
Dept: OTOLARYNGOLOGY | Facility: CLINIC | Age: 64
End: 2024-04-16
Payer: COMMERCIAL

## 2024-04-16 PROCEDURE — 99214 OFFICE O/P EST MOD 30 MIN: CPT | Mod: 25

## 2024-04-16 PROCEDURE — 31575 DIAGNOSTIC LARYNGOSCOPY: CPT

## 2024-04-16 NOTE — HISTORY OF PRESENT ILLNESS
[de-identified] : Pt with tongue cancer  and S/P right hemiglossectomy, right selective neck dissection, left radioforearm free flap and tracheostomy tube placement on 08/30/2023 and path Squamous cell carcinoma, well differentiated, Resection margins are negative for carcinoma. Pt completed RT with Dr. Garcia on 12/8/2023 and Dr. Jenkins f/u as need.  Last ct of neck and PET ct on 3/5/2024. working with SLP and Dr. Palmer-working lymphedema.  Pt is here to f/u on oral ulcer/back of the throat with pain - some what better, tongue still swollen and right side jaw still swollen and tenderness.  former smoker social etoh.

## 2024-04-16 NOTE — PHYSICAL EXAM
[Normal] : no rashes [Laryngoscopy Performed] : laryngoscopy was performed, see procedure section for findings [de-identified] : Posttreatment changes, no LAD. [FreeTextEntry1] : Flap is healing well.  No concerning lesions in the OC/OPx on inspection or palpation.  No obvious lesions on inspection of palpation.  No TTP.

## 2024-04-16 NOTE — DATA REVIEWED
[de-identified] : PET/CT and CT Neck independently interpreted - there is increased avidity in the right posterolarteral oral cavity, nonspecific.  No clear lesion on CT but significant dental artifact.

## 2024-04-16 NOTE — PROCEDURE
[Dysphagia] : dysphagia not clearly evaluated by indirect laryngoscopy [None] : none [Flexible Endoscope] : examined with the flexible endoscope [Serial Number: ___] : Serial Number: [unfilled] [de-identified] : No lesions in the NPx, OPx, HPx or larynx.  Expected posttreatment changes, VC are mobile, airway patent.

## 2024-04-30 ENCOUNTER — APPOINTMENT (OUTPATIENT)
Dept: ORTHOPEDIC SURGERY | Facility: CLINIC | Age: 64
End: 2024-04-30

## 2024-05-03 ENCOUNTER — APPOINTMENT (OUTPATIENT)
Dept: OTOLARYNGOLOGY | Facility: CLINIC | Age: 64
End: 2024-05-03
Payer: COMMERCIAL

## 2024-05-03 PROCEDURE — 92526 ORAL FUNCTION THERAPY: CPT | Mod: GN

## 2024-05-28 ENCOUNTER — APPOINTMENT (OUTPATIENT)
Dept: PHYSICAL MEDICINE AND REHAB | Facility: CLINIC | Age: 64
End: 2024-05-28
Payer: COMMERCIAL

## 2024-05-28 VITALS
HEIGHT: 66 IN | DIASTOLIC BLOOD PRESSURE: 74 MMHG | BODY MASS INDEX: 34.23 KG/M2 | WEIGHT: 213 LBS | HEART RATE: 63 BPM | SYSTOLIC BLOOD PRESSURE: 116 MMHG | RESPIRATION RATE: 14 BRPM

## 2024-05-28 DIAGNOSIS — M95.8 OTHER SPECIFIED ACQUIRED DEFORMITIES OF MUSCULOSKELETAL SYSTEM: ICD-10-CM

## 2024-05-28 DIAGNOSIS — M89.8X3 OTHER SPECIFIED DISORDERS OF BONE, FOREARM: ICD-10-CM

## 2024-05-28 PROCEDURE — 99214 OFFICE O/P EST MOD 30 MIN: CPT

## 2024-05-28 NOTE — HISTORY OF PRESENT ILLNESS
[FreeTextEntry1] : Ms. 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  She still reports having pain in her left wrist.  Following up with Ortho hand next week.  Taking meloxicam as needed.  Reports neck pain has improved.  No longer feeling radiation pain down arm.  Reports strength is improved.  No new weakness numbness ting or bowel bladder dysfunction.  Still has lymphedema in her cervical region.  Using compression garment.

## 2024-05-28 NOTE — ASSESSMENT
[FreeTextEntry1] : 63 year old female presenting for evaluation.  #Neuropathic pain -Titrate down gabapentin to discontinuation   #Scapular winging -Completed OT -Continue home exercise program   #Lymphedema: -Continue MLD -Continue compression garment  -Recommend pneumatic compression pump  #Left wrist pain: -Left wrist x-ray reviewed -Refer to ortho hand  -meloxicam 15mg daily with food prn, advised on prolonged risks of NSAID use   Follow up in 3 months.

## 2024-06-06 NOTE — CONSULT LETTER
[Dear  ___] : Dear  [unfilled], Limit your Potassium intake: discharge level 5.2,  slightly elevated, Please inform your PMD

## 2024-06-10 ENCOUNTER — APPOINTMENT (OUTPATIENT)
Dept: OTOLARYNGOLOGY | Facility: CLINIC | Age: 64
End: 2024-06-10
Payer: COMMERCIAL

## 2024-06-10 VITALS
OXYGEN SATURATION: 97 % | HEART RATE: 68 BPM | HEIGHT: 66 IN | DIASTOLIC BLOOD PRESSURE: 85 MMHG | WEIGHT: 214 LBS | SYSTOLIC BLOOD PRESSURE: 134 MMHG | BODY MASS INDEX: 34.39 KG/M2

## 2024-06-10 DIAGNOSIS — R13.10 DYSPHAGIA, UNSPECIFIED: ICD-10-CM

## 2024-06-10 DIAGNOSIS — M79.2 NEURALGIA AND NEURITIS, UNSPECIFIED: ICD-10-CM

## 2024-06-10 DIAGNOSIS — I89.0 LYMPHEDEMA, NOT ELSEWHERE CLASSIFIED: ICD-10-CM

## 2024-06-10 DIAGNOSIS — K11.7 DISTURBANCES OF SALIVARY SECRETION: ICD-10-CM

## 2024-06-10 PROCEDURE — 99214 OFFICE O/P EST MOD 30 MIN: CPT | Mod: 25

## 2024-06-10 PROCEDURE — 31575 DIAGNOSTIC LARYNGOSCOPY: CPT

## 2024-06-10 RX ORDER — GABAPENTIN 250 MG/5ML
250 SOLUTION ORAL
Qty: 540 | Refills: 1 | Status: COMPLETED | COMMUNITY
Start: 2023-11-14 | End: 2024-06-10

## 2024-06-10 RX ORDER — GABAPENTIN 250 MG/5ML
250 SOLUTION ORAL
Qty: 1080 | Refills: 2 | Status: COMPLETED | COMMUNITY
Start: 2023-11-28 | End: 2024-06-10

## 2024-06-10 NOTE — DATA REVIEWED
[de-identified] : CT Neck from  Radiology independently interpreted - JIMY.  CT Chest is reported negative.

## 2024-06-10 NOTE — HISTORY OF PRESENT ILLNESS
[de-identified] : Pt with tongue cancer  and S/P right hemiglossectomy, right selective neck dissection, left radioforearm free flap and tracheostomy tube placement on 08/30/2023 and path Squamous cell carcinoma, well differentiated, Resection margins are negative for carcinoma. Pt completed RT with Dr. Garcia on 12/8/2023 and Dr. Jenkins f/u as need.  ct of neck and PET ct on 3/5/2024. working with SLP and Dr. Palmer-working lymphedema.  last ct of neck and chest 5/23/2024 at [facility] stable.  Pt is here to f/u on oral ulcer/back of the throat with pain - n bleeding no new change. but  tongue still swollen and right side jaw still swollen and tenderness. lymphedema treatment at home.   former smoker social etoh.

## 2024-06-10 NOTE — PROCEDURE
[Dysphagia] : dysphagia not clearly evaluated by indirect laryngoscopy [None] : none [Flexible Endoscope] : examined with the flexible endoscope [Serial Number: ___] : Serial Number: [unfilled] [de-identified] : After patient consents, a FFL is performed.  No lesions in the NPx, OPx, HPx or larynx.  Expected posttreatment changes, VC are mobile, airway patent.

## 2024-06-10 NOTE — PHYSICAL EXAM
[Normal] : no rashes [Laryngoscopy Performed] : laryngoscopy was performed, see procedure section for findings [de-identified] : Posttreatment changes, no LAD. [FreeTextEntry1] : Flap is healing well.  No concerning lesions in the OC/OPx on inspection or palpation.  No obvious lesions on inspection of palpation.  No TTP.

## 2024-06-19 NOTE — HISTORY OF PRESENT ILLNESS
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  Interval history: 5/23/24 Ct neck Impression: Interval postoperative changes in the right side of the tongue and right neck dissection with no definite suspicious mass or pathologic lymphadenopathy 5/23/24 Ct chest: IMPRESSION: No evidence of metastatic disease in the chest. Met with [facility] 6/10/24: Laryngoscopy: No lesions in the NPx, OPx, HPx or larynx. Expected posttreatment changes, VC are mobile, airway patent.    xerostomia, dysgeusia, odynophagia, dysphagia, neck pain/stiffness, fatigue or weight loss  Care team: Plastics: Twan Jenkins PCP Yamilex Velasquez PMR Shawn Palmer ANA [facility]

## 2024-06-19 NOTE — DISEASE MANAGEMENT
[Pathological] : TNM Stage: p [I] : I [FreeTextEntry4] : right tongue, squamous cell, well-differentiated [TTNM] : 1 [NTNM] : 0 [MTNM] : 0

## 2024-06-25 ENCOUNTER — NON-APPOINTMENT (OUTPATIENT)
Age: 64
End: 2024-06-25

## 2024-06-25 ENCOUNTER — APPOINTMENT (OUTPATIENT)
Dept: RADIATION ONCOLOGY | Facility: CLINIC | Age: 64
End: 2024-06-25
Payer: COMMERCIAL

## 2024-06-25 VITALS
WEIGHT: 213 LBS | BODY MASS INDEX: 34.23 KG/M2 | TEMPERATURE: 96.5 F | HEIGHT: 66 IN | HEART RATE: 54 BPM | DIASTOLIC BLOOD PRESSURE: 79 MMHG | OXYGEN SATURATION: 99 % | RESPIRATION RATE: 15 BRPM | SYSTOLIC BLOOD PRESSURE: 141 MMHG

## 2024-06-25 DIAGNOSIS — C02.9 MALIGNANT NEOPLASM OF TONGUE, UNSPECIFIED: ICD-10-CM

## 2024-06-25 PROCEDURE — G2211 COMPLEX E/M VISIT ADD ON: CPT | Mod: NC

## 2024-06-25 PROCEDURE — 99214 OFFICE O/P EST MOD 30 MIN: CPT

## 2024-07-26 NOTE — CONSULT LETTER
[Dear  ___] : Dear  [unfilled], Heart sounds: Normal heart sounds.   Pulmonary:      Effort: Pulmonary effort is normal. No respiratory distress.      Breath sounds: Normal breath sounds.   Musculoskeletal:         General: Swelling present. No tenderness, deformity or signs of injury. Normal range of motion.      Cervical back: Normal range of motion and neck supple.      Right lower leg: No edema.      Left lower leg: No edema.   Skin:     General: Skin is warm and dry.      Capillary Refill: Capillary refill takes less than 2 seconds.      Findings: No bruising or erythema.   Neurological:      General: No focal deficit present.      Mental Status: She is alert and oriented to person, place, and time.   Psychiatric:         Mood and Affect: Mood normal.         Behavior: Behavior normal.        Essentially unchanged    Left thumb: There is a mass palpated over the volar aspect of the thumb near her flexor sheath.  This appears to be adjacent to the neurovascular bundle.  There is minimal tenderness to palpation.  Grossly neurovascularly intact distally.  Range of motion full.      Imagin2024 3 views of left thumb independently reviewed on date of examination does not show any fracture, dislocation, or any other acute osseous abnormalities.  No acute fracture or dislocation noted.  No degenerative changes noted.  No mass effect noted at the area in question.    2022 3 views of left hand does not show any fracture, dislocation, or any other osseous abnormalities.       Impression     Diagnosis Orders   1. Subcutaneous mass of left thumb        2. Pre-operative exam                Plan:     Proceed as scheduled for left thumb mass excision    Discussed operative versus nonoperative treatment, postoperative convalescence, and answered all questions.  All identified risk factors of the above procedure been discussed with the patient.  We specifically discussed the risk of damage to the neurovascular bundle as well as the flexor  [Courtesy Letter:] : I had the pleasure of seeing your patient, [unfilled], in my office today. [Please see my note below.] : Please see my note below. [Consult Closing:] : Thank you very much for allowing me to participate in the care of this patient.  If you have any questions, please do not hesitate to contact me. [Sincerely,] : Sincerely, [FreeTextEntry2] : Dr. Karoline Alves [id number],  [place] [address] [FreeTextEntry3] : Dev

## 2024-08-27 ENCOUNTER — APPOINTMENT (OUTPATIENT)
Dept: PHYSICAL MEDICINE AND REHAB | Facility: CLINIC | Age: 64
End: 2024-08-27
Payer: COMMERCIAL

## 2024-08-27 VITALS
SYSTOLIC BLOOD PRESSURE: 124 MMHG | BODY MASS INDEX: 33.75 KG/M2 | HEIGHT: 66 IN | RESPIRATION RATE: 14 BRPM | HEART RATE: 60 BPM | DIASTOLIC BLOOD PRESSURE: 79 MMHG | WEIGHT: 210 LBS

## 2024-08-27 DIAGNOSIS — I89.0 LYMPHEDEMA, NOT ELSEWHERE CLASSIFIED: ICD-10-CM

## 2024-08-27 DIAGNOSIS — M79.2 NEURALGIA AND NEURITIS, UNSPECIFIED: ICD-10-CM

## 2024-08-27 PROCEDURE — 99214 OFFICE O/P EST MOD 30 MIN: CPT

## 2024-08-27 RX ORDER — GABAPENTIN 300 MG/1
300 CAPSULE ORAL 3 TIMES DAILY
Qty: 90 | Refills: 2 | Status: ACTIVE | COMMUNITY
Start: 2024-08-27 | End: 1900-01-01

## 2024-08-27 NOTE — ASSESSMENT
[FreeTextEntry1] : 63 year old female presenting for evaluation.  #Neuropathic pain -Start gabapentin 300mg TID-rx sent -Follow up with ortho spine -No signs of myelopathy on exam, educated on red flag signs for which to report to ER   #Scapular winging -Continue home exercise program   #Lymphedema: -Continue MLD -Continue compression garment  -Recommend pneumatic compression pump  #Left wrist pain: -Continue to follow with ortho hand  -meloxicam 15mg daily with food prn, advised on prolonged risks of NSAID use   Follow up in 6-8 weeks.

## 2024-08-27 NOTE — HISTORY OF PRESENT ILLNESS
[FreeTextEntry1] : Ms. 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  She is reporting some more radiating pain into her right lateral arm.  Denies any weakness no bowel bladder dysfunction.  Reports is worse with going over bumps.  Reports lymphedema has been stable.  She is still doing self MLD.  Saw Ortho hand for her wrist pain and it is improving with bracing.

## 2024-08-27 NOTE — PHYSICAL EXAM
[FreeTextEntry1] : Gen: Patient is A&O x 3, NAD HEENT: EOMI, hearing grossly normal, Tongue ulcerations  Resp: regular, non - labored CV: pulses regular Skin: no rashes, erythema, no increased warmth  Lymph: +Submental/cervical lymphedema  Inspection: Mild scapula wining  ROM: full throughout Palpation: TTP left radius  Sensation: intact to light touch, mild decreased in right lateral arm  Reflexes: 1+ and symmetric throughout Strength: 5/5 throughout Special tests: -Stanford sign, -Hoffmans sign, no clonus, +Spurling sign  Gait: normal, non-antalgic

## 2024-09-13 ENCOUNTER — APPOINTMENT (OUTPATIENT)
Dept: OTOLARYNGOLOGY | Facility: CLINIC | Age: 64
End: 2024-09-13
Payer: COMMERCIAL

## 2024-09-13 VITALS
HEART RATE: 57 BPM | SYSTOLIC BLOOD PRESSURE: 130 MMHG | HEIGHT: 66 IN | RESPIRATION RATE: 16 BRPM | DIASTOLIC BLOOD PRESSURE: 81 MMHG | BODY MASS INDEX: 33.75 KG/M2 | OXYGEN SATURATION: 100 % | WEIGHT: 210 LBS

## 2024-09-13 DIAGNOSIS — C02.9 MALIGNANT NEOPLASM OF TONGUE, UNSPECIFIED: ICD-10-CM

## 2024-09-13 DIAGNOSIS — R13.10 DYSPHAGIA, UNSPECIFIED: ICD-10-CM

## 2024-09-13 DIAGNOSIS — M54.2 CERVICALGIA: ICD-10-CM

## 2024-09-13 DIAGNOSIS — I89.0 LYMPHEDEMA, NOT ELSEWHERE CLASSIFIED: ICD-10-CM

## 2024-09-13 PROCEDURE — 99214 OFFICE O/P EST MOD 30 MIN: CPT | Mod: 25

## 2024-09-13 PROCEDURE — 31575 DIAGNOSTIC LARYNGOSCOPY: CPT

## 2024-09-13 NOTE — PHYSICAL EXAM
[Normal] : no rashes [Laryngoscopy Performed] : laryngoscopy was performed, see procedure section for findings [de-identified] : Posttreatment changes, no LAD. [de-identified] : Irregular thick lesion along the right cheek, approx. 1.1 cm - SCCa. [FreeTextEntry1] : Flap is healing well.  No concerning lesions in the OC/OPx on inspection or palpation.  No obvious lesions on inspection of palpation.  No TTP.

## 2024-09-13 NOTE — PROCEDURE
[Dysphagia] : dysphagia not clearly evaluated by indirect laryngoscopy [None] : none [Flexible Endoscope] : examined with the flexible endoscope [Serial Number: ___] : Serial Number: [unfilled] [de-identified] : After patient consents, a FFL is performed.  No lesions in the NPx, OPx, HPx or larynx.  Expected posttreatment changes, VC are mobile, airway patent.

## 2024-09-13 NOTE — HISTORY OF PRESENT ILLNESS
[de-identified] : Pt with tongue cancer and S/P right hemiglossectomy, right selective neck dissection, left radioforearm free flap and tracheostomy tube placement on 08/30/2023 and path Squamous cell carcinoma, well differentiated, Resection margins are negative for carcinoma. Pt completed RT with Dr. Garcia on 12/8/2023 and Dr. Jenkins f/u as need.  ct of neck and PET ct on 3/5/2024. working with SLP and Dr. Palmer-working for neck pain back. last ct of neck and chest 5/23/2024 at [facility]. Pt is recently right cheek with SCCa and planning Moh surgery on Tuesday - Dr. Mcduffie.  Pt is here to f/u and tongue still swollen on and off. lymphedema treatment at home.   former smoker social etoh.

## 2024-10-08 ENCOUNTER — NON-APPOINTMENT (OUTPATIENT)
Age: 64
End: 2024-10-08

## 2024-10-10 ENCOUNTER — APPOINTMENT (OUTPATIENT)
Dept: PHYSICAL MEDICINE AND REHAB | Facility: CLINIC | Age: 64
End: 2024-10-10
Payer: COMMERCIAL

## 2024-10-10 VITALS
HEIGHT: 66 IN | DIASTOLIC BLOOD PRESSURE: 86 MMHG | HEART RATE: 60 BPM | SYSTOLIC BLOOD PRESSURE: 144 MMHG | BODY MASS INDEX: 33.75 KG/M2 | RESPIRATION RATE: 14 BRPM | WEIGHT: 210 LBS

## 2024-10-10 DIAGNOSIS — M79.2 NEURALGIA AND NEURITIS, UNSPECIFIED: ICD-10-CM

## 2024-10-10 DIAGNOSIS — I89.0 LYMPHEDEMA, NOT ELSEWHERE CLASSIFIED: ICD-10-CM

## 2024-10-10 PROCEDURE — 99214 OFFICE O/P EST MOD 30 MIN: CPT

## 2024-10-29 ENCOUNTER — APPOINTMENT (OUTPATIENT)
Dept: RADIATION ONCOLOGY | Facility: CLINIC | Age: 64
End: 2024-10-29
Payer: COMMERCIAL

## 2024-10-29 ENCOUNTER — NON-APPOINTMENT (OUTPATIENT)
Age: 64
End: 2024-10-29

## 2024-10-29 VITALS
BODY MASS INDEX: 34.55 KG/M2 | SYSTOLIC BLOOD PRESSURE: 162 MMHG | HEIGHT: 66 IN | OXYGEN SATURATION: 99 % | WEIGHT: 215 LBS | RESPIRATION RATE: 16 BRPM | HEART RATE: 65 BPM | DIASTOLIC BLOOD PRESSURE: 92 MMHG

## 2024-10-29 DIAGNOSIS — C02.9 MALIGNANT NEOPLASM OF TONGUE, UNSPECIFIED: ICD-10-CM

## 2024-10-29 PROCEDURE — 99214 OFFICE O/P EST MOD 30 MIN: CPT

## 2024-10-29 PROCEDURE — G2211 COMPLEX E/M VISIT ADD ON: CPT | Mod: NC

## 2024-12-10 ENCOUNTER — APPOINTMENT (OUTPATIENT)
Dept: OTOLARYNGOLOGY | Facility: CLINIC | Age: 64
End: 2024-12-10
Payer: COMMERCIAL

## 2024-12-10 DIAGNOSIS — C02.9 MALIGNANT NEOPLASM OF TONGUE, UNSPECIFIED: ICD-10-CM

## 2024-12-10 DIAGNOSIS — I89.0 LYMPHEDEMA, NOT ELSEWHERE CLASSIFIED: ICD-10-CM

## 2024-12-10 DIAGNOSIS — R13.10 DYSPHAGIA, UNSPECIFIED: ICD-10-CM

## 2024-12-10 PROCEDURE — 31575 DIAGNOSTIC LARYNGOSCOPY: CPT

## 2024-12-10 PROCEDURE — 99214 OFFICE O/P EST MOD 30 MIN: CPT | Mod: 25

## 2024-12-10 RX ORDER — ALPRAZOLAM 0.5 MG/1
0.5 TABLET ORAL
Qty: 2 | Refills: 0 | Status: ACTIVE | COMMUNITY
Start: 2024-12-10 | End: 1900-01-01

## 2025-01-14 ENCOUNTER — APPOINTMENT (OUTPATIENT)
Dept: PHYSICAL MEDICINE AND REHAB | Facility: CLINIC | Age: 65
End: 2025-01-14
Payer: COMMERCIAL

## 2025-01-14 VITALS
SYSTOLIC BLOOD PRESSURE: 122 MMHG | RESPIRATION RATE: 14 BRPM | BODY MASS INDEX: 34.72 KG/M2 | WEIGHT: 216 LBS | HEART RATE: 76 BPM | HEIGHT: 66 IN | DIASTOLIC BLOOD PRESSURE: 79 MMHG

## 2025-01-14 DIAGNOSIS — I89.0 LYMPHEDEMA, NOT ELSEWHERE CLASSIFIED: ICD-10-CM

## 2025-01-14 DIAGNOSIS — M79.2 NEURALGIA AND NEURITIS, UNSPECIFIED: ICD-10-CM

## 2025-01-14 PROCEDURE — 99214 OFFICE O/P EST MOD 30 MIN: CPT

## 2025-01-27 ENCOUNTER — NON-APPOINTMENT (OUTPATIENT)
Age: 65
End: 2025-01-27

## 2025-01-27 ENCOUNTER — APPOINTMENT (OUTPATIENT)
Dept: SPINE | Facility: CLINIC | Age: 65
End: 2025-01-27
Payer: COMMERCIAL

## 2025-01-27 VITALS
DIASTOLIC BLOOD PRESSURE: 85 MMHG | SYSTOLIC BLOOD PRESSURE: 134 MMHG | WEIGHT: 216 LBS | BODY MASS INDEX: 34.72 KG/M2 | OXYGEN SATURATION: 98 % | HEART RATE: 60 BPM | HEIGHT: 66 IN

## 2025-01-27 DIAGNOSIS — D32.0 BENIGN NEOPLASM OF CEREBRAL MENINGES: ICD-10-CM

## 2025-01-27 PROCEDURE — 99204 OFFICE O/P NEW MOD 45 MIN: CPT

## 2025-02-04 ENCOUNTER — APPOINTMENT (OUTPATIENT)
Dept: RADIATION ONCOLOGY | Facility: CLINIC | Age: 65
End: 2025-02-04
Payer: COMMERCIAL

## 2025-02-04 ENCOUNTER — NON-APPOINTMENT (OUTPATIENT)
Age: 65
End: 2025-02-04

## 2025-02-04 DIAGNOSIS — C02.9 MALIGNANT NEOPLASM OF TONGUE, UNSPECIFIED: ICD-10-CM

## 2025-02-04 PROCEDURE — 99214 OFFICE O/P EST MOD 30 MIN: CPT

## 2025-02-04 PROCEDURE — G2211 COMPLEX E/M VISIT ADD ON: CPT | Mod: NC

## 2025-03-31 ENCOUNTER — APPOINTMENT (OUTPATIENT)
Dept: SPINE | Facility: CLINIC | Age: 65
End: 2025-03-31
Payer: COMMERCIAL

## 2025-03-31 VITALS
DIASTOLIC BLOOD PRESSURE: 74 MMHG | SYSTOLIC BLOOD PRESSURE: 128 MMHG | HEIGHT: 66 IN | WEIGHT: 216 LBS | BODY MASS INDEX: 34.72 KG/M2 | OXYGEN SATURATION: 98 % | HEART RATE: 58 BPM

## 2025-03-31 DIAGNOSIS — D32.0 BENIGN NEOPLASM OF CEREBRAL MENINGES: ICD-10-CM

## 2025-03-31 PROCEDURE — 99213 OFFICE O/P EST LOW 20 MIN: CPT

## 2025-04-28 ENCOUNTER — APPOINTMENT (OUTPATIENT)
Dept: OTOLARYNGOLOGY | Facility: CLINIC | Age: 65
End: 2025-04-28
Payer: COMMERCIAL

## 2025-04-28 VITALS
DIASTOLIC BLOOD PRESSURE: 77 MMHG | HEIGHT: 66 IN | HEART RATE: 60 BPM | OXYGEN SATURATION: 97 % | WEIGHT: 222 LBS | BODY MASS INDEX: 35.68 KG/M2 | SYSTOLIC BLOOD PRESSURE: 139 MMHG

## 2025-04-28 DIAGNOSIS — R13.10 DYSPHAGIA, UNSPECIFIED: ICD-10-CM

## 2025-04-28 DIAGNOSIS — I89.0 LYMPHEDEMA, NOT ELSEWHERE CLASSIFIED: ICD-10-CM

## 2025-04-28 PROCEDURE — 31575 DIAGNOSTIC LARYNGOSCOPY: CPT

## 2025-04-28 PROCEDURE — 99214 OFFICE O/P EST MOD 30 MIN: CPT | Mod: 25

## 2025-05-06 ENCOUNTER — NON-APPOINTMENT (OUTPATIENT)
Age: 65
End: 2025-05-06

## 2025-05-06 ENCOUNTER — APPOINTMENT (OUTPATIENT)
Dept: RADIATION ONCOLOGY | Facility: CLINIC | Age: 65
End: 2025-05-06
Payer: COMMERCIAL

## 2025-05-06 VITALS
SYSTOLIC BLOOD PRESSURE: 113 MMHG | HEART RATE: 63 BPM | HEIGHT: 66 IN | WEIGHT: 225 LBS | DIASTOLIC BLOOD PRESSURE: 71 MMHG | OXYGEN SATURATION: 99 % | BODY MASS INDEX: 36.16 KG/M2 | RESPIRATION RATE: 16 BRPM

## 2025-05-06 DIAGNOSIS — C02.9 MALIGNANT NEOPLASM OF TONGUE, UNSPECIFIED: ICD-10-CM

## 2025-05-06 PROCEDURE — 99214 OFFICE O/P EST MOD 30 MIN: CPT

## 2025-05-06 PROCEDURE — G2211 COMPLEX E/M VISIT ADD ON: CPT | Mod: NC

## 2025-05-15 ENCOUNTER — APPOINTMENT (OUTPATIENT)
Dept: PHYSICAL MEDICINE AND REHAB | Facility: CLINIC | Age: 65
End: 2025-05-15
Payer: COMMERCIAL

## 2025-05-15 VITALS
SYSTOLIC BLOOD PRESSURE: 128 MMHG | DIASTOLIC BLOOD PRESSURE: 81 MMHG | WEIGHT: 225 LBS | HEART RATE: 55 BPM | BODY MASS INDEX: 36.16 KG/M2 | HEIGHT: 66 IN | RESPIRATION RATE: 14 BRPM

## 2025-05-15 DIAGNOSIS — M54.2 CERVICALGIA: ICD-10-CM

## 2025-05-15 DIAGNOSIS — M79.2 NEURALGIA AND NEURITIS, UNSPECIFIED: ICD-10-CM

## 2025-05-15 DIAGNOSIS — I89.0 LYMPHEDEMA, NOT ELSEWHERE CLASSIFIED: ICD-10-CM

## 2025-05-15 PROCEDURE — 99214 OFFICE O/P EST MOD 30 MIN: CPT

## 2025-05-15 RX ORDER — GABAPENTIN 300 MG/1
300 CAPSULE ORAL
Qty: 30 | Refills: 1 | Status: ACTIVE | COMMUNITY
Start: 2025-05-15 | End: 1900-01-01

## 2025-08-11 ENCOUNTER — APPOINTMENT (OUTPATIENT)
Dept: OTOLARYNGOLOGY | Facility: CLINIC | Age: 65
End: 2025-08-11
Payer: COMMERCIAL

## 2025-08-11 VITALS
DIASTOLIC BLOOD PRESSURE: 84 MMHG | HEIGHT: 66 IN | HEART RATE: 58 BPM | WEIGHT: 225 LBS | BODY MASS INDEX: 36.16 KG/M2 | RESPIRATION RATE: 16 BRPM | SYSTOLIC BLOOD PRESSURE: 130 MMHG | OXYGEN SATURATION: 98 %

## 2025-08-11 DIAGNOSIS — R13.10 DYSPHAGIA, UNSPECIFIED: ICD-10-CM

## 2025-08-11 DIAGNOSIS — I89.0 LYMPHEDEMA, NOT ELSEWHERE CLASSIFIED: ICD-10-CM

## 2025-08-11 DIAGNOSIS — M79.2 NEURALGIA AND NEURITIS, UNSPECIFIED: ICD-10-CM

## 2025-08-11 DIAGNOSIS — C02.9 MALIGNANT NEOPLASM OF TONGUE, UNSPECIFIED: ICD-10-CM

## 2025-08-11 PROCEDURE — 99214 OFFICE O/P EST MOD 30 MIN: CPT | Mod: 25

## 2025-08-11 PROCEDURE — 31575 DIAGNOSTIC LARYNGOSCOPY: CPT

## 2025-09-17 ENCOUNTER — TRANSCRIPTION ENCOUNTER (OUTPATIENT)
Age: 65
End: 2025-09-17

## (undated) DEVICE — LAP PAD W RING 18 X 18"

## (undated) DEVICE — CLAMP MICROVASCULAR SINGLE  1-2MM

## (undated) DEVICE — TOURNIQUET CUFF 18" DUAL PORT SINGLE BLADDER LUER LOCK (BLACK)

## (undated) DEVICE — DRAPE TOWEL BLUE 17" X 24"

## (undated) DEVICE — SYR LUER LOK 10CC

## (undated) DEVICE — DRAIN RESERVOIR FOR JACKSON PRATT 100CC CARDINAL

## (undated) DEVICE — SUT VICRYL 3-0 27" SH UNDYED

## (undated) DEVICE — LIJ-ZIMMER MESHGRAFTER: Type: DURABLE MEDICAL EQUIPMENT

## (undated) DEVICE — ELCTR BOVIE PENCIL SMOKE EVACUATION

## (undated) DEVICE — BIPOLAR FORCEP CORD WECK STANDARD 12FT

## (undated) DEVICE — SUT SILK 2-0 18" FS

## (undated) DEVICE — CLAMP MICROVASCULAR DOUBLE  1-2MM

## (undated) DEVICE — SUT VICRYL 2-0 27" CT-1 UNDYED

## (undated) DEVICE — DRAPE 1/2 SHEET 40X57"

## (undated) DEVICE — DRSG STOCKINETTE IMPERVIOUS LG

## (undated) DEVICE — DRSG PREVENA PLUS SYSTEM

## (undated) DEVICE — BIPOLAR FORCEP KIRWAN JEWELERS STR 4" X 0.4MM W 12FT CORD (GREEN)

## (undated) DEVICE — FRAZIER SUCTION TIP 8FR

## (undated) DEVICE — FOLEY TRAY 16FR 5CC LF UMETER CLOSED

## (undated) DEVICE — STAPLER SKIN VISI-STAT 35 WIDE

## (undated) DEVICE — RUBBER BANDS STERILE

## (undated) DEVICE — VENODYNE/SCD SLEEVE CALF MEDIUM

## (undated) DEVICE — VAGINAL PACKING 2"

## (undated) DEVICE — SUT CHROMIC 3-0 27" RB-1

## (undated) DEVICE — SUT BIOSYN 4-0 18" P-12

## (undated) DEVICE — PACK FREE FLAP

## (undated) DEVICE — SUT ETHILON 5-0 18" PS-2

## (undated) DEVICE — TOURNIQUET CUFF 34" DUAL PORT W PLC

## (undated) DEVICE — WOUND IRR SURGIPHOR

## (undated) DEVICE — SUT VICRYL 2-0 27" SH UNDYED

## (undated) DEVICE — TRACH TIE MEDIUM

## (undated) DEVICE — SPEAR SURG WECKCEL

## (undated) DEVICE — DRAPE ARMATEC HANDLE 5" X 10"

## (undated) DEVICE — TOURNIQUET CUFF 24" DUAL PORT SINGLE BLADDER LUER LOCK  (BLACK)

## (undated) DEVICE — DRAPE SPLIT SHEET 77" X 108"

## (undated) DEVICE — DRSG XEROFORM 5 X 9"

## (undated) DEVICE — DRAPE INSTRUMENT POUCH 6.75" X 11"

## (undated) DEVICE — DRAPE FLUID WARMER 44 X 44"

## (undated) DEVICE — WARMING BLANKET LOWER ADULT

## (undated) DEVICE — TUBING SUCTION NONCONDUCTIVE 6MM X 12FT

## (undated) DEVICE — ELCTR BOVIE TIP NEEDLE INSULATED 2.8" EDGE

## (undated) DEVICE — Device

## (undated) DEVICE — DRSG TELFA 3 X 8

## (undated) DEVICE — DOPPLER PROBE  CABLE

## (undated) DEVICE — GLV 8 PROTEXIS (WHITE)

## (undated) DEVICE — ELCTR E/S NEEDLE 0.75"

## (undated) DEVICE — ELCTR BOVIE TIP BLADE INSULATED 2.75" EDGE

## (undated) DEVICE — DRAPE 3/4 SHEET 52X76"

## (undated) DEVICE — SOL IRR POUR H2O 500ML

## (undated) DEVICE — DRAPE MAGNETIC INSTRUMENT MEDIUM

## (undated) DEVICE — DRSG WEBRIL 6"

## (undated) DEVICE — ELCTR BOVIE TIP BLADE INSULATED 2.8" EDGE WITH SAFETY

## (undated) DEVICE — WARMING BLANKET FULL ADULT

## (undated) DEVICE — SUT ETHILON 8-0 5" BV130-5

## (undated) DEVICE — SPONGE PEANUT AUTO COUNT

## (undated) DEVICE — SUT PROLENE 5-0 36" RB-1

## (undated) DEVICE — WARMING BLANKET FULL UNDERBODY

## (undated) DEVICE — ELCTR GROUNDING PAD ADULT COVIDIEN

## (undated) DEVICE — POSITIONER FOAM EGG CRATE ULNAR 2PCS (PINK)

## (undated) DEVICE — PACK HEAD & NECK

## (undated) DEVICE — GOWN LG

## (undated) DEVICE — DRAPE SPLIT SHEET 77" X 120"

## (undated) DEVICE — SUT SILK 2-0 30" FSL

## (undated) DEVICE — SOL IRR POUR NS 0.9% 500ML

## (undated) DEVICE — GLV 7.5 PROTEXIS (WHITE)

## (undated) DEVICE — DRSG BENZOIN 0.6CC

## (undated) DEVICE — DRSG TEGADERM 4X4.75"

## (undated) DEVICE — SYR LUER LOK 3CC

## (undated) DEVICE — PROTECTOR HEEL / ELBOW FLUFFY

## (undated) DEVICE — MERCIAN VISABILITY BACKROUND YELLOW

## (undated) DEVICE — DRAIN JACKSON PRATT 10MM FLAT 3/4 NO TROCAR

## (undated) DEVICE — DRAIN JACKSON PRATT 7MM FLAT FULL NO TROCAR

## (undated) DEVICE — SUT SILK 2-0 30" SH

## (undated) DEVICE — CANNULA ANT CHMBR 27GX22MM

## (undated) DEVICE — LABELS BLANK W PEN

## (undated) DEVICE — DRSG KLING 4"

## (undated) DEVICE — NDL COUNTER FOAM AND MAGNET 20-40

## (undated) DEVICE — CLAMP BULLDOG MIDI 45 DEGREE (GREEN) DISP

## (undated) DEVICE — LONE STAR RETRACTOR RING 12MM BLUNT DISP

## (undated) DEVICE — PREP BETADINE SPONGE STICKS

## (undated) DEVICE — CANISTER DISPOSABLE THIN WALL 3000CC

## (undated) DEVICE — SUT MONOCRYL 3-0 27" PS-2 UNDYED

## (undated) DEVICE — NDL HYPO SAFE 25G X 5/8" (ORANGE)

## (undated) DEVICE — BASIN SET DOUBLE

## (undated) DEVICE — TOURNIQUET ESMARK 6"

## (undated) DEVICE — GEL ULTRASOUND 0.25L

## (undated) DEVICE — POSITIONER STRAP ARMBOARD VELCRO TS-30

## (undated) DEVICE — BLADE SURGICAL #11 CARBON

## (undated) DEVICE — TONSIL ROLLS

## (undated) DEVICE — GLV 8.5 PROTEXIS (WHITE)